# Patient Record
Sex: FEMALE | Race: WHITE | Employment: OTHER | URBAN - METROPOLITAN AREA
[De-identification: names, ages, dates, MRNs, and addresses within clinical notes are randomized per-mention and may not be internally consistent; named-entity substitution may affect disease eponyms.]

---

## 2021-05-07 ENCOUNTER — HOSPITAL ENCOUNTER (OUTPATIENT)
Age: 76
Setting detail: SPECIMEN
Discharge: HOME | End: 2021-05-07
Payer: MEDICARE

## 2021-05-07 LAB
ABSOLUTE EOS #: 0.18 K/UL (ref 0–0.44)
ABSOLUTE IMMATURE GRANULOCYTE: <0.03 K/UL (ref 0–0.3)
ABSOLUTE LYMPH #: 0.98 K/UL (ref 1.1–3.7)
ABSOLUTE MONO #: 0.53 K/UL (ref 0.1–1.2)
ALBUMIN SERPL-MCNC: 4.2 G/DL (ref 3.5–5.2)
ALBUMIN/GLOBULIN RATIO: 1.4 (ref 1–2.5)
ALP BLD-CCNC: 61 U/L (ref 35–104)
ALT SERPL-CCNC: 10 U/L (ref 5–33)
ANION GAP SERPL CALCULATED.3IONS-SCNC: 12 MMOL/L (ref 9–17)
AST SERPL-CCNC: 22 U/L
BASOPHILS # BLD: 1 % (ref 0–2)
BASOPHILS ABSOLUTE: 0.06 K/UL (ref 0–0.2)
BILIRUB SERPL-MCNC: 0.63 MG/DL (ref 0.3–1.2)
BUN BLDV-MCNC: 17 MG/DL (ref 8–23)
BUN/CREAT BLD: ABNORMAL (ref 9–20)
CALCIUM SERPL-MCNC: 9.4 MG/DL (ref 8.6–10.4)
CHLORIDE BLD-SCNC: 104 MMOL/L (ref 98–107)
CHOLESTEROL, FASTING: 149 MG/DL
CHOLESTEROL/HDL RATIO: 2.1
CO2: 27 MMOL/L (ref 20–31)
CREAT SERPL-MCNC: 0.94 MG/DL (ref 0.5–0.9)
DIFFERENTIAL TYPE: ABNORMAL
EOSINOPHILS RELATIVE PERCENT: 4 % (ref 1–4)
GFR AFRICAN AMERICAN: >60 ML/MIN
GFR NON-AFRICAN AMERICAN: 58 ML/MIN
GFR SERPL CREATININE-BSD FRML MDRD: ABNORMAL ML/MIN/{1.73_M2}
GFR SERPL CREATININE-BSD FRML MDRD: ABNORMAL ML/MIN/{1.73_M2}
GLUCOSE FASTING: 83 MG/DL (ref 70–99)
HCT VFR BLD CALC: 41.8 % (ref 36.3–47.1)
HDLC SERPL-MCNC: 70 MG/DL
HEMOGLOBIN: 13 G/DL (ref 11.9–15.1)
IMMATURE GRANULOCYTES: 0 %
LDL CHOLESTEROL: 61 MG/DL (ref 0–130)
LYMPHOCYTES # BLD: 21 % (ref 24–43)
MCH RBC QN AUTO: 30.8 PG (ref 25.2–33.5)
MCHC RBC AUTO-ENTMCNC: 31.1 G/DL (ref 28.4–34.8)
MCV RBC AUTO: 99.1 FL (ref 82.6–102.9)
MONOCYTES # BLD: 12 % (ref 3–12)
NRBC AUTOMATED: 0 PER 100 WBC
PDW BLD-RTO: 12.7 % (ref 11.8–14.4)
PLATELET # BLD: 203 K/UL (ref 138–453)
PLATELET ESTIMATE: ABNORMAL
PMV BLD AUTO: 11.4 FL (ref 8.1–13.5)
POTASSIUM SERPL-SCNC: 4.5 MMOL/L (ref 3.7–5.3)
RBC # BLD: 4.22 M/UL (ref 3.95–5.11)
RBC # BLD: ABNORMAL 10*6/UL
SEG NEUTROPHILS: 62 % (ref 36–65)
SEGMENTED NEUTROPHILS ABSOLUTE COUNT: 2.83 K/UL (ref 1.5–8.1)
SODIUM BLD-SCNC: 143 MMOL/L (ref 135–144)
TOTAL PROTEIN: 7.1 G/DL (ref 6.4–8.3)
TRIGLYCERIDE, FASTING: 92 MG/DL
VLDLC SERPL CALC-MCNC: NORMAL MG/DL (ref 1–30)
WBC # BLD: 4.6 K/UL (ref 3.5–11.3)
WBC # BLD: ABNORMAL 10*3/UL

## 2021-08-25 ENCOUNTER — HOSPITAL ENCOUNTER (INPATIENT)
Age: 76
LOS: 8 days | Discharge: NURSING, REHAB OR LONG-TERM CARE | DRG: 270 | End: 2021-09-03
Attending: EMERGENCY MEDICINE | Admitting: INTERNAL MEDICINE
Payer: MEDICARE

## 2021-08-25 ENCOUNTER — APPOINTMENT (OUTPATIENT)
Dept: GENERAL RADIOLOGY | Age: 76
DRG: 270 | End: 2021-08-25
Payer: MEDICARE

## 2021-08-25 DIAGNOSIS — I21.4 NSTEMI (NON-ST ELEVATED MYOCARDIAL INFARCTION) (HCC): ICD-10-CM

## 2021-08-25 DIAGNOSIS — R07.9 CHEST PAIN, UNSPECIFIED TYPE: Primary | ICD-10-CM

## 2021-08-25 DIAGNOSIS — N17.9 ACUTE KIDNEY INJURY (HCC): ICD-10-CM

## 2021-08-25 LAB
ABSOLUTE EOS #: 0.16 K/UL (ref 0–0.44)
ABSOLUTE IMMATURE GRANULOCYTE: <0.03 K/UL (ref 0–0.3)
ABSOLUTE LYMPH #: 1.7 K/UL (ref 1.1–3.7)
ABSOLUTE MONO #: 0.61 K/UL (ref 0.1–1.2)
ALBUMIN SERPL-MCNC: 4.4 G/DL (ref 3.5–5.2)
ALBUMIN/GLOBULIN RATIO: 1.8 (ref 1–2.5)
ALP BLD-CCNC: 63 U/L (ref 35–104)
ALT SERPL-CCNC: 11 U/L (ref 5–33)
ANION GAP SERPL CALCULATED.3IONS-SCNC: 15 MMOL/L (ref 9–17)
AST SERPL-CCNC: 27 U/L
BASOPHILS # BLD: 1 % (ref 0–2)
BASOPHILS ABSOLUTE: 0.05 K/UL (ref 0–0.2)
BILIRUB SERPL-MCNC: 0.68 MG/DL (ref 0.3–1.2)
BNP INTERPRETATION: ABNORMAL
BUN BLDV-MCNC: 18 MG/DL (ref 8–23)
BUN/CREAT BLD: ABNORMAL (ref 9–20)
CALCIUM SERPL-MCNC: 9.4 MG/DL (ref 8.6–10.4)
CHLORIDE BLD-SCNC: 104 MMOL/L (ref 98–107)
CO2: 22 MMOL/L (ref 20–31)
CREAT SERPL-MCNC: 0.93 MG/DL (ref 0.5–0.9)
DIFFERENTIAL TYPE: NORMAL
EOSINOPHILS RELATIVE PERCENT: 3 % (ref 1–4)
GFR AFRICAN AMERICAN: >60 ML/MIN
GFR NON-AFRICAN AMERICAN: 58 ML/MIN
GFR SERPL CREATININE-BSD FRML MDRD: ABNORMAL ML/MIN/{1.73_M2}
GFR SERPL CREATININE-BSD FRML MDRD: ABNORMAL ML/MIN/{1.73_M2}
GLUCOSE BLD-MCNC: 107 MG/DL (ref 70–99)
HCT VFR BLD CALC: 41.3 % (ref 36.3–47.1)
HCT VFR BLD CALC: 42.1 % (ref 36.3–47.1)
HEMOGLOBIN: 13.4 G/DL (ref 11.9–15.1)
HEMOGLOBIN: 13.6 G/DL (ref 11.9–15.1)
IMMATURE GRANULOCYTES: 0 %
LIPASE: 19 U/L (ref 13–60)
LYMPHOCYTES # BLD: 31 % (ref 24–43)
MCH RBC QN AUTO: 31.3 PG (ref 25.2–33.5)
MCH RBC QN AUTO: 31.4 PG (ref 25.2–33.5)
MCHC RBC AUTO-ENTMCNC: 31.8 G/DL (ref 28.4–34.8)
MCHC RBC AUTO-ENTMCNC: 32.9 G/DL (ref 28.4–34.8)
MCV RBC AUTO: 95.2 FL (ref 82.6–102.9)
MCV RBC AUTO: 98.6 FL (ref 82.6–102.9)
MONOCYTES # BLD: 11 % (ref 3–12)
NRBC AUTOMATED: 0 PER 100 WBC
NRBC AUTOMATED: 0 PER 100 WBC
PARTIAL THROMBOPLASTIN TIME: 24.8 SEC (ref 20.5–30.5)
PDW BLD-RTO: 12.3 % (ref 11.8–14.4)
PDW BLD-RTO: 12.3 % (ref 11.8–14.4)
PLATELET # BLD: 167 K/UL (ref 138–453)
PLATELET # BLD: 171 K/UL (ref 138–453)
PLATELET ESTIMATE: NORMAL
PMV BLD AUTO: 11.5 FL (ref 8.1–13.5)
PMV BLD AUTO: 11.7 FL (ref 8.1–13.5)
POTASSIUM SERPL-SCNC: 4.7 MMOL/L (ref 3.7–5.3)
PRO-BNP: 1690 PG/ML
RBC # BLD: 4.27 M/UL (ref 3.95–5.11)
RBC # BLD: 4.34 M/UL (ref 3.95–5.11)
RBC # BLD: NORMAL 10*6/UL
SEG NEUTROPHILS: 54 % (ref 36–65)
SEGMENTED NEUTROPHILS ABSOLUTE COUNT: 2.99 K/UL (ref 1.5–8.1)
SODIUM BLD-SCNC: 141 MMOL/L (ref 135–144)
TOTAL PROTEIN: 6.8 G/DL (ref 6.4–8.3)
TROPONIN INTERP: ABNORMAL
TROPONIN INTERP: ABNORMAL
TROPONIN INTERP: NORMAL
TROPONIN T: ABNORMAL NG/ML
TROPONIN T: ABNORMAL NG/ML
TROPONIN T: NORMAL NG/ML
TROPONIN, HIGH SENSITIVITY: 13 NG/L (ref 0–14)
TROPONIN, HIGH SENSITIVITY: 267 NG/L (ref 0–14)
TROPONIN, HIGH SENSITIVITY: 291 NG/L (ref 0–14)
WBC # BLD: 5.5 K/UL (ref 3.5–11.3)
WBC # BLD: 6.4 K/UL (ref 3.5–11.3)
WBC # BLD: NORMAL 10*3/UL

## 2021-08-25 PROCEDURE — 83880 ASSAY OF NATRIURETIC PEPTIDE: CPT

## 2021-08-25 PROCEDURE — 96365 THER/PROPH/DIAG IV INF INIT: CPT

## 2021-08-25 PROCEDURE — 84484 ASSAY OF TROPONIN QUANT: CPT

## 2021-08-25 PROCEDURE — 80053 COMPREHEN METABOLIC PANEL: CPT

## 2021-08-25 PROCEDURE — 6370000000 HC RX 637 (ALT 250 FOR IP): Performed by: EMERGENCY MEDICINE

## 2021-08-25 PROCEDURE — 85025 COMPLETE CBC W/AUTO DIFF WBC: CPT

## 2021-08-25 PROCEDURE — 93005 ELECTROCARDIOGRAM TRACING: CPT | Performed by: EMERGENCY MEDICINE

## 2021-08-25 PROCEDURE — 85730 THROMBOPLASTIN TIME PARTIAL: CPT

## 2021-08-25 PROCEDURE — 6360000002 HC RX W HCPCS: Performed by: NURSE PRACTITIONER

## 2021-08-25 PROCEDURE — 99285 EMERGENCY DEPT VISIT HI MDM: CPT

## 2021-08-25 PROCEDURE — G0378 HOSPITAL OBSERVATION PER HR: HCPCS

## 2021-08-25 PROCEDURE — 2580000003 HC RX 258: Performed by: EMERGENCY MEDICINE

## 2021-08-25 PROCEDURE — 36415 COLL VENOUS BLD VENIPUNCTURE: CPT

## 2021-08-25 PROCEDURE — 96366 THER/PROPH/DIAG IV INF ADDON: CPT

## 2021-08-25 PROCEDURE — 85027 COMPLETE CBC AUTOMATED: CPT

## 2021-08-25 PROCEDURE — 71045 X-RAY EXAM CHEST 1 VIEW: CPT

## 2021-08-25 PROCEDURE — 94761 N-INVAS EAR/PLS OXIMETRY MLT: CPT

## 2021-08-25 PROCEDURE — 83690 ASSAY OF LIPASE: CPT

## 2021-08-25 RX ORDER — ATORVASTATIN CALCIUM 10 MG/1
10 TABLET, FILM COATED ORAL NIGHTLY
Status: DISCONTINUED | OUTPATIENT
Start: 2021-08-25 | End: 2021-08-26

## 2021-08-25 RX ORDER — SODIUM CHLORIDE 0.9 % (FLUSH) 0.9 %
5-40 SYRINGE (ML) INJECTION EVERY 12 HOURS SCHEDULED
Status: DISCONTINUED | OUTPATIENT
Start: 2021-08-25 | End: 2021-08-26

## 2021-08-25 RX ORDER — SODIUM CHLORIDE 0.9 % (FLUSH) 0.9 %
5-40 SYRINGE (ML) INJECTION PRN
Status: DISCONTINUED | OUTPATIENT
Start: 2021-08-25 | End: 2021-08-26

## 2021-08-25 RX ORDER — ACETAMINOPHEN 650 MG/1
650 SUPPOSITORY RECTAL EVERY 6 HOURS PRN
Status: DISCONTINUED | OUTPATIENT
Start: 2021-08-25 | End: 2021-08-28

## 2021-08-25 RX ORDER — CLOPIDOGREL BISULFATE 75 MG/1
75 TABLET ORAL DAILY
Status: DISCONTINUED | OUTPATIENT
Start: 2021-08-25 | End: 2021-08-26

## 2021-08-25 RX ORDER — ACETAMINOPHEN 325 MG/1
650 TABLET ORAL EVERY 6 HOURS PRN
Status: DISCONTINUED | OUTPATIENT
Start: 2021-08-25 | End: 2021-08-28

## 2021-08-25 RX ORDER — HEPARIN SODIUM 1000 [USP'U]/ML
60 INJECTION, SOLUTION INTRAVENOUS; SUBCUTANEOUS ONCE
Status: COMPLETED | OUTPATIENT
Start: 2021-08-25 | End: 2021-08-25

## 2021-08-25 RX ORDER — ASPIRIN 81 MG/1
81 TABLET ORAL DAILY
Status: DISCONTINUED | OUTPATIENT
Start: 2021-08-25 | End: 2021-08-26

## 2021-08-25 RX ORDER — HEPARIN SODIUM 1000 [USP'U]/ML
30 INJECTION, SOLUTION INTRAVENOUS; SUBCUTANEOUS PRN
Status: DISCONTINUED | OUTPATIENT
Start: 2021-08-25 | End: 2021-08-26

## 2021-08-25 RX ORDER — ISOSORBIDE DINITRATE 10 MG/1
10 TABLET ORAL 2 TIMES DAILY
Status: DISCONTINUED | OUTPATIENT
Start: 2021-08-25 | End: 2021-08-26

## 2021-08-25 RX ORDER — SODIUM CHLORIDE 9 MG/ML
25 INJECTION, SOLUTION INTRAVENOUS PRN
Status: DISCONTINUED | OUTPATIENT
Start: 2021-08-25 | End: 2021-08-27

## 2021-08-25 RX ORDER — HEPARIN SODIUM 1000 [USP'U]/ML
60 INJECTION, SOLUTION INTRAVENOUS; SUBCUTANEOUS PRN
Status: DISCONTINUED | OUTPATIENT
Start: 2021-08-25 | End: 2021-08-26

## 2021-08-25 RX ORDER — DILTIAZEM HYDROCHLORIDE 180 MG/1
180 CAPSULE, COATED, EXTENDED RELEASE ORAL DAILY
Status: DISCONTINUED | OUTPATIENT
Start: 2021-08-25 | End: 2021-08-26

## 2021-08-25 RX ORDER — NITROGLYCERIN 0.4 MG/1
0.4 TABLET SUBLINGUAL ONCE
Status: COMPLETED | OUTPATIENT
Start: 2021-08-25 | End: 2021-08-25

## 2021-08-25 RX ORDER — HEPARIN SODIUM 10000 [USP'U]/100ML
5-30 INJECTION, SOLUTION INTRAVENOUS CONTINUOUS
Status: DISCONTINUED | OUTPATIENT
Start: 2021-08-25 | End: 2021-08-26

## 2021-08-25 RX ADMIN — CLOPIDOGREL 75 MG: 75 TABLET, FILM COATED ORAL at 17:01

## 2021-08-25 RX ADMIN — METOPROLOL TARTRATE 25 MG: 25 TABLET ORAL at 22:01

## 2021-08-25 RX ADMIN — NITROGLYCERIN 0.4 MG: 0.4 TABLET SUBLINGUAL at 14:49

## 2021-08-25 RX ADMIN — ATORVASTATIN CALCIUM 10 MG: 10 TABLET, FILM COATED ORAL at 22:01

## 2021-08-25 RX ADMIN — HEPARIN SODIUM 3190 UNITS: 1000 INJECTION INTRAVENOUS; SUBCUTANEOUS at 20:18

## 2021-08-25 RX ADMIN — HEPARIN SODIUM AND DEXTROSE 12 UNITS/KG/HR: 10000; 5 INJECTION INTRAVENOUS at 20:19

## 2021-08-25 RX ADMIN — SODIUM CHLORIDE, PRESERVATIVE FREE 10 ML: 5 INJECTION INTRAVENOUS at 20:26

## 2021-08-25 ASSESSMENT — PAIN DESCRIPTION - PAIN TYPE: TYPE: ACUTE PAIN

## 2021-08-25 ASSESSMENT — PAIN DESCRIPTION - ORIENTATION: ORIENTATION: MID

## 2021-08-25 ASSESSMENT — PAIN DESCRIPTION - LOCATION: LOCATION: CHEST

## 2021-08-25 ASSESSMENT — PAIN DESCRIPTION - ONSET: ONSET: ON-GOING

## 2021-08-25 ASSESSMENT — PAIN DESCRIPTION - DESCRIPTORS: DESCRIPTORS: PRESSURE

## 2021-08-25 ASSESSMENT — PAIN SCALES - GENERAL
PAINLEVEL_OUTOF10: 1
PAINLEVEL_OUTOF10: 1
PAINLEVEL_OUTOF10: 0

## 2021-08-25 ASSESSMENT — ENCOUNTER SYMPTOMS
SHORTNESS OF BREATH: 0
NAUSEA: 0
VOMITING: 0
TROUBLE SWALLOWING: 0

## 2021-08-25 ASSESSMENT — PAIN DESCRIPTION - FREQUENCY: FREQUENCY: CONTINUOUS

## 2021-08-25 NOTE — PROGRESS NOTES
CDU Transfer Summary        Patient:  Shana Najera  YOB: 1945    MRN: [id number]   Acct: [de-identified]    Primary Care Physician: No primary care provider on file. Admit date:  8/25/2021 12:12 PM  Transfer date:08/25/21  Transfer Diagnoses: Elevated Troponin    Acute chest pain due to unknown etiology. Elevated troponin from emergency department of 13 2-91    Follow-up: Outpatient follow up will be arranged by discharging service. Discharge Medications:  Changes to medications made prior to transfer        [address] Medication Instructions SRD:[id number]    Printed on:08/25/21 1934   Medication Information                      aspirin 81 MG EC tablet  Take 81 mg by mouth daily. Atorvastatin Calcium (LIPITOR PO)  Take 10 mg by mouth daily. BIOTIN  Take 3,000 Units by mouth daily. DILTIAZEM HCL  Take 180 mg by mouth daily. ISOSORBIDE  Take 10 mg by mouth 2 times daily. metoprolol (LOPRESSOR) 25 MG tablet  Take 25 mg by mouth 2 times daily. Diet:  ADULT DIET;  Regular  Diet NPO , Advance as tolerated     Activity:  As tolerated    Consultants: IP CONSULT TO CARDIOLOGY    Procedures:  Not indicated     Diagnostic Test:   Results for orders placed or performed during the hospital encounter of 08/25/21   CBC Auto Differential   Result Value Ref Range    WBC 5.5 3.5 - 11.3 k/uL    RBC 4.27 3.95 - 5.11 m/uL    Hemoglobin 13.4 11.9 - 15.1 g/dL    Hematocrit 42.1 36.3 - 47.1 %    MCV 98.6 82.6 - 102.9 fL    MCH 31.4 25.2 - 33.5 pg    MCHC 31.8 28.4 - 34.8 g/dL    RDW 12.3 11.8 - 14.4 %    Platelets 842 121 - 835 k/uL    MPV 11.7 8.1 - 13.5 fL    NRBC Automated 0.0 0.0 per 100 WBC    Differential Type NOT REPORTED     Seg Neutrophils 54 36 - 65 %    Lymphocytes 31 24 - 43 %    Monocytes 11 3 - 12 %    Eosinophils % 3 1 - 4 %    Basophils 1 0 - 2 %    Immature Granulocytes 0 0 %    Segs Absolute 2.99 1.50 -6 degrees    R Axis 0 degrees    T Axis 5 degrees     XR CHEST PORTABLE    Result Date: 8/25/2021  EXAMINATION: ONE XRAY VIEW OF THE CHEST 8/25/2021 12:50 pm COMPARISON: None. HISTORY: ORDERING SYSTEM PROVIDED HISTORY: chest pain TECHNOLOGIST PROVIDED HISTORY: chest pain Reason for Exam: upr Acuity: Unknown Type of Exam: Unknown FINDINGS: Calcifications involving the aorta reflect atherosclerosis. The cardiomediastinal and hilar silhouettes appear otherwise unremarkable. Chronic appearing coarse interstitial densities predominate perihilar regions and lung bases, typical of sequela from smoking or other previous infectious/inflammatory process. The lungs are hyperinflated with increased translucency both lung apices and tapering of the vasculature in the upper lungs. The lungs appear otherwise clear. No pleural fluid evident. No pneumothorax is seen. No acute osseous abnormality is identified. Sequela from prior open heart surgery. No acute pulmonary disease. Calcific atherosclerotic disease aorta. Physical Exam:    General appearance - NAD, AOx 3   Lungs -CTAB, no R/R/R  Heart - RRR, no M/R/G  Abdomen - Soft, NT/ND  Neurological:  MAEx4, No focal motor deficit, sensory loss  Extremities - Cap refil <2 sec in all ext., no edema  Skin -warm, dry      Hospital Course:  Clinical course has improved, labs and imaging reviewed. Rut Adams originally presented to the hospital on 8/25/2021 12:12 PM. with complaints of chest discomfort that began around 11 AM associated with radiation to the jaw. At that time it was determined that She required further observation and cardiac evaluation. She was admitted and labs and imaging were followed daily. Imaging results as above. Upon admission to observation unit patient had 2 elevated troponins that increased from 13 2-67 and 291. EKG normal sinus rhythm with nonspecific T wave abnormality. Patient complaining of pain of a 1 on a scale of 0-10.

## 2021-08-25 NOTE — ED NOTES
Pt arrived to the ED via EMS with chest pain that started this morning while lying in bed. Pt received 1 nitrogylcerin and 324mg of apirin PTA. Pt denies chest pain currently. EKG obtained, pt placed on full cardiac monitor. RR even and unlabored. A&Ox4.      Nadeem Rodriguez RN  08/25/21 4449

## 2021-08-25 NOTE — ED PROVIDER NOTES
[facility] ED  Emergency Department  Faculty Sign-Out Addendum     Care of Luciano Canela was assumed from previous attending and is being seen for Chest Pain  . The patient's initial evaluation and plan have been discussed with the prior provider who initially evaluated the patient. Attestation    I was available and discussed any additional care issues that arose and coordinated the management plans with the resident(s) caring for the patient during my duty period. Any areas of disagreement with residents documentation of care or procedures are noted on the chart. I was personally present for the key portions of any/all procedures during my duty period. I have documented in the chart those procedures where I was not present during the key portions. EMERGENCY DEPARTMENT COURSE / MEDICAL DECISION MAKING:       MEDICATIONS GIVEN:  Orders Placed This Encounter   Medications    nitroGLYCERIN (NITROSTAT) SL tablet 0.4 mg       LABS / RADIOLOGY:     Labs Reviewed   COMPREHENSIVE METABOLIC PANEL W/ REFLEX TO MG FOR LOW K - Abnormal; Notable for the following components:       Result Value    Glucose 107 (*)     CREATININE 0.93 (*)     GFR Non- 58 (*)     All other components within normal limits   BRAIN NATRIURETIC PEPTIDE - Abnormal; Notable for the following components:    Pro-BNP 1,690 (*)     All other components within normal limits   CBC WITH AUTO DIFFERENTIAL   TROPONIN   LIPASE       XR CHEST PORTABLE    Result Date: 8/25/2021  EXAMINATION: ONE XRAY VIEW OF THE CHEST 8/25/2021 12:50 pm COMPARISON: None. HISTORY: ORDERING SYSTEM PROVIDED HISTORY: chest pain TECHNOLOGIST PROVIDED HISTORY: chest pain Reason for Exam: upr Acuity: Unknown Type of Exam: Unknown FINDINGS: Calcifications involving the aorta reflect atherosclerosis. The cardiomediastinal and hilar silhouettes appear otherwise unremarkable.  Chronic appearing coarse interstitial densities predominate perihilar regions and lung bases, typical of sequela from smoking or other previous infectious/inflammatory process. The lungs are hyperinflated with increased translucency both lung apices and tapering of the vasculature in the upper lungs. The lungs appear otherwise clear. No pleural fluid evident. No pneumothorax is seen. No acute osseous abnormality is identified. Sequela from prior open heart surgery. No acute pulmonary disease. Calcific atherosclerotic disease aorta. RECENT VITALS:     Temp: 98.3 °F (36.8 °C),  Pulse: 66, Resp: 13, BP: 132/64, SpO2: 98 %    Binh Pickard is a 64 y.o. female who presents with complaint of chest pain. Prior history of CABG. Cardiac work-up thus far unremarkable in the emergency department. Admitted to observation unit for cardiology consultation    OUTSTANDING TASKS / RECOMMENDATIONS:    1.  Disposition made by previous attending and nothing to do      Woodroe Litten, MD, [place]  Attending Emergency Physician  [facility] ED        Claude Gudino MD  08/25/21 2617

## 2021-08-25 NOTE — H&P
98%.            DIFFERENTIAL DIAGNOSIS/MDM:     DDx: Acute angina, MI, CHF    DIAGNOSTIC RESULTS     RADIOLOGY:   I directly visualized the following  images and reviewed the radiologist interpretations:    XR CHEST PORTABLE    Result Date: 8/25/2021  EXAMINATION: ONE XRAY VIEW OF THE CHEST 8/25/2021 12:50 pm COMPARISON: None. HISTORY: ORDERING SYSTEM PROVIDED HISTORY: chest pain TECHNOLOGIST PROVIDED HISTORY: chest pain Reason for Exam: upr Acuity: Unknown Type of Exam: Unknown FINDINGS: Calcifications involving the aorta reflect atherosclerosis. The cardiomediastinal and hilar silhouettes appear otherwise unremarkable. Chronic appearing coarse interstitial densities predominate perihilar regions and lung bases, typical of sequela from smoking or other previous infectious/inflammatory process. The lungs are hyperinflated with increased translucency both lung apices and tapering of the vasculature in the upper lungs. The lungs appear otherwise clear. No pleural fluid evident. No pneumothorax is seen. No acute osseous abnormality is identified. Sequela from prior open heart surgery. No acute pulmonary disease. Calcific atherosclerotic disease aorta. LABS:  I have reviewed and interpreted all available lab results.   Labs Reviewed   COMPREHENSIVE METABOLIC PANEL W/ REFLEX TO MG FOR LOW K - Abnormal; Notable for the following components:       Result Value    Glucose 107 (*)     CREATININE 0.93 (*)     GFR Non- 58 (*)     All other components within normal limits   BRAIN NATRIURETIC PEPTIDE - Abnormal; Notable for the following components:    Pro-BNP 1,690 (*)     All other components within normal limits   CBC WITH AUTO DIFFERENTIAL   TROPONIN   LIPASE   TROPONIN   TROPONIN       SCREENING TOOLS:    HEART Risk Score for Chest Pain Patients   History and Physical Exam Suspicion Level  (Nausea, Vomiting, Diaphoresis, Radiation, Exertion)   Slightly Suspicious (0 pts)   Moderately Suspicious (1 pt)   Highly Suspicious (2 pts)   EKG Interpretation   Normal (0 pts)   Non-Specific Repolarization Disturbance (1 pt)   Significant ST-Depression (2 pts)   Age of Patient (in years)   = 39 (0 pts)   46-64 (1 pt)   = 65 (2 pts)   Risk Factors   No Risk Factors (0 pts)   1-2 Risk Factors (1 pt)   = 3 Risk Factors (2 pts)   Risk Factors Include:   Hypercholesterolemia   Hypertension   Diabetes Mellitus   Cigarette smoking   Positive family history   Obesity   CAD   (SLE, CKDz, HIV, Cocaine abuse)   Troponin Levels   = Normal Limit (0 pts)   1-3 Times Normal Limit (1 pt)   > 3 Times Normal Limit (2 pts)  TOTAL:    Percent Risk for Major Adverse Cardiac Event (MACE)  0-3 pts indicates low risk for MACE   2.5% (DISCHARGE)   4-7 pts indicates moderate risk for MACE  20.3% (OBS)  8-10 pts indicates high risk for MACE  72.7% (EARLY INVASIVE TX)    CDU IMPRESSION / PLAN      Salvador Weber is a 64 y.o. female who presents with complaints of chest pain that radiates to the jaw that began around 11 AM.  Patient has a history of CAD with surgery approximately 25 years ago. Patient denies any associated symptoms such as shortness of breath, numbness and tingling. Given clinical presentation will admit for further observation and cardiac evaluation    1. Inpatient consult to cardiology-appreciate recommendations  · Symptom management  · Continue home medications and pain control  · Monitor vitals, labs, and imaging  · DISPO: pending consults and clinical improvement    CONSULTS:    IP CONSULT TO CARDIOLOGY    PROCEDURES:  Not indicated       PATIENT REFERRED TO:    No follow-up provider specified. --  My Supervising Physician for today is Dr. Ladi Moran  We discussed and agreed upon a treatment plan  Kary Floyd, [facility]   Emergency Medicine Resident     This dictation was generated by voice recognition computer software.   Although all attempts are made to edit the dictation for accuracy, there may be errors in the transcription that are not intended.

## 2021-08-25 NOTE — ED NOTES
Bed: 29  Expected date:   Expected time:   Means of arrival:   Comments:  KWABENA Rodriugez RN  08/25/21 1214

## 2021-08-25 NOTE — Clinical Note
Patient Class: Observation [104]   REQUIRED: Diagnosis: Chest pain [290078]   Estimated Length of Stay: Estimated stay of less than 2 midnights   Admitting Provider: Emely Faye [[id number]]   Telemetry/Cardiac Monitoring Required?: Yes

## 2021-08-25 NOTE — ED PROVIDER NOTES
[facility] ED  eMERGENCY dEPARTMENT eNCOUnter      Pt Name: Ricardo Alfonso  MRN: [id number]  [place] 1945  Date of evaluation: 8/25/21      CHIEF COMPLAINT       Chief Complaint   Patient presents with    Chest Pain       HISTORY OF PRESENT ILLNESS     Ricardo Alfonso is a 64 y.o. female who presents to the emergency department for evaluation of generalized chest discomfort. Onset was around 11:00 this morning. Patient describes diffuse chest pain with radiation to the jaw. She does have a history of coronary artery disease but no prior stents. Associated today with diaphoresis. No shortness of breath, cough, or wheezing. No palpitations or near syncope. She was given aspirin and nitroglycerin by EMS providers and her chest pain has now resolved. She denies any recent illness or fever. No nausea/vomiting. No abdominal pain. No genitourinary or stool complaints. No lower extremity edema or calf pain. Review of systems otherwise negative. She has no additional complaints at this time. REVIEW OF SYSTEMS       Review of Systems   Constitutional: Positive for diaphoresis. Cardiovascular: Positive for chest pain. All other systems reviewed and are negative. PAST MEDICAL HISTORY      has a past medical history of Atherosclerosis, CAD (coronary artery disease), Hydatidiform mole, and Rosacea. SURGICAL HISTORY        has a past surgical history that includes Carotid endarterectomy (age 44); Coronary artery bypass graft (age 45); Tonsillectomy and adenoidectomy (age 8); and Dilation & curettage (1971). CURRENT MEDICATIONS       Previous Medications    ASPIRIN 81 MG EC TABLET    Take 81 mg by mouth daily. ATORVASTATIN CALCIUM (LIPITOR PO)    Take 10 mg by mouth daily. BIOTIN    Take 3,000 Units by mouth daily. CLOPIDOGREL BISULFATE (PLAVIX PO)    Take 75 mg by mouth daily. DILTIAZEM HCL    Take 180 mg by mouth daily.     ISOSORBIDE    Take 10 mg by mouth 2 times daily. METOPROLOL (LOPRESSOR) 25 MG TABLET    Take 25 mg by mouth 2 times daily. ALLERGIES       is allergic to aspirin-acetaminophen-caffeine. FAMILY HISTORY       She indicated that the status of her mother is unknown. She indicated that the status of her father is unknown. She indicated that the status of her sister is unknown. She indicated that the status of her paternal grandfather is unknown.     family history includes Other in her father, mother, paternal grandfather, and sister. SOCIAL HISTORY        reports that she has never smoked. She has never used smokeless tobacco. She reports that she does not drink alcohol and does not use drugs. PHYSICAL EXAM       INITIAL VITALS:  height is 5' 1\" (1.549 m) and weight is 112 lb (50.8 kg). Her oral temperature is 98.3 °F (36.8 °C). Her blood pressure is 135/61 and her pulse is 66. Her respiration is 13 and oxygen saturation is 99%. Physical Exam  Vitals and nursing note reviewed. Constitutional:       Appearance: Normal appearance. Cardiovascular:      Rate and Rhythm: Normal rate and regular rhythm. Heart sounds: Normal heart sounds. No murmur heard. Pulmonary:      Effort: Pulmonary effort is normal.      Breath sounds: Normal breath sounds. Chest:      Chest wall: No tenderness. Abdominal:      Palpations: Abdomen is soft. Tenderness: There is no abdominal tenderness. Musculoskeletal:         General: No tenderness. Right lower leg: No edema. Left lower leg: No edema. Skin:     General: Skin is warm and dry. Capillary Refill: Capillary refill takes less than 2 seconds. Neurological:      General: No focal deficit present. Mental Status: She is alert.    Psychiatric:         Mood and Affect: Mood normal.         Behavior: Behavior normal.       DIAGNOSTIC RESULTS     EKG: All EKG's are interpreted by the Emergency Department Physician who either signs or Co-signs this chart in the absence of a cardiologist.    EKG shows a normal sinus rhythm. Intervals within normal limits. Normal axis. R wave progression is maintained. No T wave inversion. No ST segment depression/elevation or evidence for acute ischemia/infarction. RADIOLOGY:   I directly visualized the following images and reviewed the radiologist interpretations:    XR CHEST PORTABLE   Final Result   No acute pulmonary disease. Calcific atherosclerotic disease aorta.              LABS:  Labs Reviewed   COMPREHENSIVE METABOLIC PANEL W/ REFLEX TO MG FOR LOW K - Abnormal; Notable for the following components:       Result Value    Glucose 107 (*)     CREATININE 0.93 (*)     GFR Non- 58 (*)     All other components within normal limits   BRAIN NATRIURETIC PEPTIDE - Abnormal; Notable for the following components:    Pro-BNP 1,690 (*)     All other components within normal limits   CBC WITH AUTO DIFFERENTIAL   TROPONIN   LIPASE     Labs reviewed      EMERGENCY DEPARTMENT COURSE:     Vitals:    Vitals:    08/25/21 1231 08/25/21 1246 08/25/21 1332 08/25/21 1401   BP: (!) 155/66 (!) 148/70 136/62 135/61   Pulse: 63 61 64 66   Resp: 12 17 23 13   Temp:       TempSrc:       SpO2: 95% 99%     Weight:       Height:         -------------------------  BP: 135/61, Temp: 98.3 °F (36.8 °C), Pulse: 66, Resp: 13    Patient updated      MEDICAL DECISION MAKING:     Patient with known history of coronary artery disease  No acute ischemia or infarction on EKG  Troponin normal  Elevated BNP  Her pain resolved after nitroglycerin  She was updated on lab, imaging, and EKG results  States that her chest pain has started to return but is only very mild at this time  Repeat nitrates ordered  Patient agreeable with plan of care for admission  With a normal troponin/EKG we will place in the observation unit  Her cardiologist is through [address] [place], [place] [address]Dr. Ronnie  Cardiology consult placed in admission orders      FINAL IMPRESSION 1. Chest pain, unspecified type          DISPOSITION/PLAN     Admit      (Please note that portions of this note were completed with a voice recognition program.  Efforts were made to edit the dictations but occasionally words are mis-transcribed.)    Meliza Live.  Delma,   Attending Emergency Physician                    Antonette Moise, DO  08/25/21 [facility] [address], DO  09/21/21 120

## 2021-08-26 ENCOUNTER — APPOINTMENT (OUTPATIENT)
Dept: CARDIAC CATH/INVASIVE PROCEDURES | Age: 76
DRG: 270 | End: 2021-08-26
Payer: MEDICARE

## 2021-08-26 PROBLEM — I21.4 NSTEMI (NON-ST ELEVATED MYOCARDIAL INFARCTION) (HCC): Status: ACTIVE | Noted: 2021-08-26

## 2021-08-26 LAB
ABSOLUTE EOS #: 0.15 K/UL (ref 0–0.44)
ABSOLUTE IMMATURE GRANULOCYTE: <0.03 K/UL (ref 0–0.3)
ABSOLUTE LYMPH #: 1.57 K/UL (ref 1.1–3.7)
ABSOLUTE MONO #: 0.67 K/UL (ref 0.1–1.2)
ACTIVATED CLOTTING TIME: 207 SEC (ref 79–149)
ANION GAP SERPL CALCULATED.3IONS-SCNC: 13 MMOL/L (ref 9–17)
BASOPHILS # BLD: 1 % (ref 0–2)
BASOPHILS ABSOLUTE: 0.06 K/UL (ref 0–0.2)
BUN BLDV-MCNC: 18 MG/DL (ref 8–23)
BUN/CREAT BLD: ABNORMAL (ref 9–20)
CALCIUM SERPL-MCNC: 8.9 MG/DL (ref 8.6–10.4)
CHLORIDE BLD-SCNC: 106 MMOL/L (ref 98–107)
CO2: 22 MMOL/L (ref 20–31)
CREAT SERPL-MCNC: 0.99 MG/DL (ref 0.5–0.9)
DIFFERENTIAL TYPE: ABNORMAL
EKG ATRIAL RATE: 64 BPM
EKG ATRIAL RATE: 65 BPM
EKG ATRIAL RATE: 74 BPM
EKG P AXIS: -6 DEGREES
EKG P AXIS: 55 DEGREES
EKG P AXIS: 56 DEGREES
EKG P-R INTERVAL: 134 MS
EKG Q-T INTERVAL: 412 MS
EKG Q-T INTERVAL: 422 MS
EKG Q-T INTERVAL: 480 MS
EKG QRS DURATION: 78 MS
EKG QRS DURATION: 80 MS
EKG QRS DURATION: 82 MS
EKG QTC CALCULATION (BAZETT): 428 MS
EKG QTC CALCULATION (BAZETT): 468 MS
EKG QTC CALCULATION (BAZETT): 495 MS
EKG R AXIS: 0 DEGREES
EKG R AXIS: 40 DEGREES
EKG R AXIS: 50 DEGREES
EKG T AXIS: 35 DEGREES
EKG T AXIS: 41 DEGREES
EKG T AXIS: 5 DEGREES
EKG VENTRICULAR RATE: 64 BPM
EKG VENTRICULAR RATE: 65 BPM
EKG VENTRICULAR RATE: 74 BPM
EOSINOPHILS RELATIVE PERCENT: 3 % (ref 1–4)
GFR AFRICAN AMERICAN: >60 ML/MIN
GFR NON-AFRICAN AMERICAN: 54 ML/MIN
GFR SERPL CREATININE-BSD FRML MDRD: ABNORMAL ML/MIN/{1.73_M2}
GFR SERPL CREATININE-BSD FRML MDRD: ABNORMAL ML/MIN/{1.73_M2}
GLUCOSE BLD-MCNC: 86 MG/DL (ref 70–99)
HCT VFR BLD CALC: 38.8 % (ref 36.3–47.1)
HEMOGLOBIN: 12.4 G/DL (ref 11.9–15.1)
IMMATURE GRANULOCYTES: 0 %
LV EF: 30 %
LVEF MODALITY: NORMAL
LYMPHOCYTES # BLD: 30 % (ref 24–43)
MCH RBC QN AUTO: 31.1 PG (ref 25.2–33.5)
MCHC RBC AUTO-ENTMCNC: 32 G/DL (ref 28.4–34.8)
MCV RBC AUTO: 97.2 FL (ref 82.6–102.9)
MONOCYTES # BLD: 13 % (ref 3–12)
NRBC AUTOMATED: 0 PER 100 WBC
PARTIAL THROMBOPLASTIN TIME: 107.6 SEC (ref 20.5–30.5)
PARTIAL THROMBOPLASTIN TIME: 44.1 SEC (ref 20.5–30.5)
PDW BLD-RTO: 12.5 % (ref 11.8–14.4)
PLATELET # BLD: 158 K/UL (ref 138–453)
PLATELET ESTIMATE: ABNORMAL
PMV BLD AUTO: 11.2 FL (ref 8.1–13.5)
POTASSIUM SERPL-SCNC: 4.4 MMOL/L (ref 3.7–5.3)
RBC # BLD: 3.99 M/UL (ref 3.95–5.11)
RBC # BLD: ABNORMAL 10*6/UL
SEG NEUTROPHILS: 53 % (ref 36–65)
SEGMENTED NEUTROPHILS ABSOLUTE COUNT: 2.86 K/UL (ref 1.5–8.1)
SODIUM BLD-SCNC: 141 MMOL/L (ref 135–144)
TROPONIN INTERP: ABNORMAL
TROPONIN INTERP: ABNORMAL
TROPONIN T: ABNORMAL NG/ML
TROPONIN T: ABNORMAL NG/ML
TROPONIN, HIGH SENSITIVITY: 319 NG/L (ref 0–14)
TROPONIN, HIGH SENSITIVITY: 340 NG/L (ref 0–14)
WBC # BLD: 5.3 K/UL (ref 3.5–11.3)
WBC # BLD: ABNORMAL 10*3/UL

## 2021-08-26 PROCEDURE — 93010 ELECTROCARDIOGRAM REPORT: CPT | Performed by: INTERNAL MEDICINE

## 2021-08-26 PROCEDURE — 02703EZ DILATION OF CORONARY ARTERY, ONE ARTERY WITH TWO INTRALUMINAL DEVICES, PERCUTANEOUS APPROACH: ICD-10-PCS | Performed by: INTERNAL MEDICINE

## 2021-08-26 PROCEDURE — 85025 COMPLETE CBC W/AUTO DIFF WBC: CPT

## 2021-08-26 PROCEDURE — 80048 BASIC METABOLIC PNL TOTAL CA: CPT

## 2021-08-26 PROCEDURE — 2709999900 HC NON-CHARGEABLE SUPPLY

## 2021-08-26 PROCEDURE — 6360000002 HC RX W HCPCS: Performed by: STUDENT IN AN ORGANIZED HEALTH CARE EDUCATION/TRAINING PROGRAM

## 2021-08-26 PROCEDURE — C1769 GUIDE WIRE: HCPCS

## 2021-08-26 PROCEDURE — 84484 ASSAY OF TROPONIN QUANT: CPT

## 2021-08-26 PROCEDURE — 93306 TTE W/DOPPLER COMPLETE: CPT

## 2021-08-26 PROCEDURE — C1874 STENT, COATED/COV W/DEL SYS: HCPCS

## 2021-08-26 PROCEDURE — 4A023N7 MEASUREMENT OF CARDIAC SAMPLING AND PRESSURE, LEFT HEART, PERCUTANEOUS APPROACH: ICD-10-PCS | Performed by: INTERNAL MEDICINE

## 2021-08-26 PROCEDURE — B2111ZZ FLUOROSCOPY OF MULTIPLE CORONARY ARTERIES USING LOW OSMOLAR CONTRAST: ICD-10-PCS | Performed by: INTERNAL MEDICINE

## 2021-08-26 PROCEDURE — 36415 COLL VENOUS BLD VENIPUNCTURE: CPT

## 2021-08-26 PROCEDURE — C1725 CATH, TRANSLUMIN NON-LASER: HCPCS

## 2021-08-26 PROCEDURE — 2580000003 HC RX 258: Performed by: STUDENT IN AN ORGANIZED HEALTH CARE EDUCATION/TRAINING PROGRAM

## 2021-08-26 PROCEDURE — 93308 TTE F-UP OR LMTD: CPT

## 2021-08-26 PROCEDURE — C9600 PERC DRUG-EL COR STENT SING: HCPCS | Performed by: INTERNAL MEDICINE

## 2021-08-26 PROCEDURE — 99223 1ST HOSP IP/OBS HIGH 75: CPT | Performed by: INTERNAL MEDICINE

## 2021-08-26 PROCEDURE — 85347 COAGULATION TIME ACTIVATED: CPT

## 2021-08-26 PROCEDURE — 6370000000 HC RX 637 (ALT 250 FOR IP): Performed by: STUDENT IN AN ORGANIZED HEALTH CARE EDUCATION/TRAINING PROGRAM

## 2021-08-26 PROCEDURE — 85730 THROMBOPLASTIN TIME PARTIAL: CPT

## 2021-08-26 PROCEDURE — 93459 L HRT ART/GRFT ANGIO: CPT | Performed by: INTERNAL MEDICINE

## 2021-08-26 PROCEDURE — 96366 THER/PROPH/DIAG IV INF ADDON: CPT

## 2021-08-26 PROCEDURE — 6360000004 HC RX CONTRAST MEDICATION

## 2021-08-26 PROCEDURE — 93005 ELECTROCARDIOGRAM TRACING: CPT | Performed by: STUDENT IN AN ORGANIZED HEALTH CARE EDUCATION/TRAINING PROGRAM

## 2021-08-26 PROCEDURE — 6360000002 HC RX W HCPCS

## 2021-08-26 PROCEDURE — 6370000000 HC RX 637 (ALT 250 FOR IP): Performed by: EMERGENCY MEDICINE

## 2021-08-26 PROCEDURE — 83735 ASSAY OF MAGNESIUM: CPT

## 2021-08-26 PROCEDURE — C1887 CATHETER, GUIDING: HCPCS

## 2021-08-26 PROCEDURE — C1894 INTRO/SHEATH, NON-LASER: HCPCS

## 2021-08-26 PROCEDURE — 2000000000 HC ICU R&B

## 2021-08-26 PROCEDURE — B2151ZZ FLUOROSCOPY OF LEFT HEART USING LOW OSMOLAR CONTRAST: ICD-10-PCS | Performed by: INTERNAL MEDICINE

## 2021-08-26 PROCEDURE — 6370000000 HC RX 637 (ALT 250 FOR IP)

## 2021-08-26 PROCEDURE — 6360000002 HC RX W HCPCS: Performed by: NURSE PRACTITIONER

## 2021-08-26 PROCEDURE — 2500000003 HC RX 250 WO HCPCS

## 2021-08-26 PROCEDURE — B2131ZZ FLUOROSCOPY OF MULTIPLE CORONARY ARTERY BYPASS GRAFTS USING LOW OSMOLAR CONTRAST: ICD-10-PCS | Performed by: INTERNAL MEDICINE

## 2021-08-26 PROCEDURE — 027035Z DILATION OF CORONARY ARTERY, ONE ARTERY WITH TWO DRUG-ELUTING INTRALUMINAL DEVICES, PERCUTANEOUS APPROACH: ICD-10-PCS | Performed by: INTERNAL MEDICINE

## 2021-08-26 RX ORDER — HYDRALAZINE HYDROCHLORIDE 20 MG/ML
10 INJECTION INTRAMUSCULAR; INTRAVENOUS EVERY 10 MIN PRN
Status: DISCONTINUED | OUTPATIENT
Start: 2021-08-26 | End: 2021-08-28

## 2021-08-26 RX ORDER — SODIUM CHLORIDE 9 MG/ML
25 INJECTION, SOLUTION INTRAVENOUS PRN
Status: DISCONTINUED | OUTPATIENT
Start: 2021-08-26 | End: 2021-08-27

## 2021-08-26 RX ORDER — NITROGLYCERIN 20 MG/100ML
5-200 INJECTION INTRAVENOUS CONTINUOUS
Status: DISCONTINUED | OUTPATIENT
Start: 2021-08-27 | End: 2021-08-28

## 2021-08-26 RX ORDER — SODIUM CHLORIDE 0.9 % (FLUSH) 0.9 %
5-40 SYRINGE (ML) INJECTION PRN
Status: DISCONTINUED | OUTPATIENT
Start: 2021-08-26 | End: 2021-08-28

## 2021-08-26 RX ORDER — HEPARIN SODIUM 10000 [USP'U]/100ML
5-30 INJECTION, SOLUTION INTRAVENOUS CONTINUOUS
Status: DISCONTINUED | OUTPATIENT
Start: 2021-08-27 | End: 2021-08-27

## 2021-08-26 RX ORDER — LIDOCAINE 4 G/G
1 PATCH TOPICAL DAILY
Status: DISCONTINUED | OUTPATIENT
Start: 2021-08-27 | End: 2021-08-28

## 2021-08-26 RX ORDER — ATORVASTATIN CALCIUM 80 MG/1
80 TABLET, FILM COATED ORAL NIGHTLY
Status: DISCONTINUED | OUTPATIENT
Start: 2021-08-26 | End: 2021-08-28

## 2021-08-26 RX ORDER — MORPHINE SULFATE 2 MG/ML
2 INJECTION, SOLUTION INTRAMUSCULAR; INTRAVENOUS ONCE
Status: COMPLETED | OUTPATIENT
Start: 2021-08-26 | End: 2021-08-26

## 2021-08-26 RX ORDER — SODIUM CHLORIDE 0.9 % (FLUSH) 0.9 %
5-40 SYRINGE (ML) INJECTION EVERY 12 HOURS SCHEDULED
Status: DISCONTINUED | OUTPATIENT
Start: 2021-08-26 | End: 2021-08-28

## 2021-08-26 RX ORDER — ASPIRIN 81 MG/1
81 TABLET, CHEWABLE ORAL DAILY
Status: DISCONTINUED | OUTPATIENT
Start: 2021-08-27 | End: 2021-08-28

## 2021-08-26 RX ORDER — ONDANSETRON 2 MG/ML
4 INJECTION INTRAMUSCULAR; INTRAVENOUS EVERY 6 HOURS PRN
Status: DISCONTINUED | OUTPATIENT
Start: 2021-08-26 | End: 2021-08-28

## 2021-08-26 RX ORDER — LABETALOL HYDROCHLORIDE 5 MG/ML
10 INJECTION, SOLUTION INTRAVENOUS EVERY 30 MIN PRN
Status: DISCONTINUED | OUTPATIENT
Start: 2021-08-26 | End: 2021-08-28

## 2021-08-26 RX ADMIN — ATORVASTATIN CALCIUM 80 MG: 80 TABLET, FILM COATED ORAL at 23:55

## 2021-08-26 RX ADMIN — ACETAMINOPHEN 650 MG: 325 TABLET ORAL at 19:33

## 2021-08-26 RX ADMIN — HEPARIN SODIUM 1590 UNITS: 1000 INJECTION INTRAVENOUS; SUBCUTANEOUS at 08:37

## 2021-08-26 RX ADMIN — CLOPIDOGREL 75 MG: 75 TABLET, FILM COATED ORAL at 11:41

## 2021-08-26 RX ADMIN — Medication 81 MG: at 11:41

## 2021-08-26 RX ADMIN — ISOSORBIDE DINITRATE 10 MG: 10 TABLET ORAL at 11:14

## 2021-08-26 RX ADMIN — MORPHINE SULFATE 2 MG: 2 INJECTION, SOLUTION INTRAMUSCULAR; INTRAVENOUS at 23:03

## 2021-08-26 RX ADMIN — METOPROLOL TARTRATE 25 MG: 25 TABLET ORAL at 11:15

## 2021-08-26 RX ADMIN — DILTIAZEM HYDROCHLORIDE 180 MG: 180 CAPSULE, COATED, EXTENDED RELEASE ORAL at 11:14

## 2021-08-26 RX ADMIN — SODIUM CHLORIDE, PRESERVATIVE FREE 10 ML: 5 INJECTION INTRAVENOUS at 23:00

## 2021-08-26 RX ADMIN — HEPARIN SODIUM AND DEXTROSE 10 UNITS/HR: 10000; 5 INJECTION INTRAVENOUS at 08:39

## 2021-08-26 ASSESSMENT — PAIN DESCRIPTION - DIRECTION: RADIATING_TOWARDS: CHEST

## 2021-08-26 ASSESSMENT — PAIN SCALES - GENERAL
PAINLEVEL_OUTOF10: 3
PAINLEVEL_OUTOF10: 1
PAINLEVEL_OUTOF10: 0
PAINLEVEL_OUTOF10: 0
PAINLEVEL_OUTOF10: 10
PAINLEVEL_OUTOF10: 1
PAINLEVEL_OUTOF10: 8

## 2021-08-26 ASSESSMENT — PAIN - FUNCTIONAL ASSESSMENT
PAIN_FUNCTIONAL_ASSESSMENT: PREVENTS OR INTERFERES SOME ACTIVE ACTIVITIES AND ADLS
PAIN_FUNCTIONAL_ASSESSMENT: PREVENTS OR INTERFERES SOME ACTIVE ACTIVITIES AND ADLS

## 2021-08-26 ASSESSMENT — PAIN DESCRIPTION - LOCATION
LOCATION: CHEST
LOCATION: NECK
LOCATION: BACK

## 2021-08-26 ASSESSMENT — PAIN DESCRIPTION - FREQUENCY
FREQUENCY: CONTINUOUS

## 2021-08-26 ASSESSMENT — PAIN DESCRIPTION - PROGRESSION
CLINICAL_PROGRESSION: NOT CHANGED

## 2021-08-26 ASSESSMENT — PAIN DESCRIPTION - ORIENTATION
ORIENTATION: MID;POSTERIOR
ORIENTATION: MID;POSTERIOR
ORIENTATION: MID

## 2021-08-26 ASSESSMENT — PAIN DESCRIPTION - ONSET
ONSET: ON-GOING

## 2021-08-26 ASSESSMENT — PAIN DESCRIPTION - DESCRIPTORS
DESCRIPTORS: PRESSURE
DESCRIPTORS: ACHING;DISCOMFORT
DESCRIPTORS: ACHING;DISCOMFORT

## 2021-08-26 ASSESSMENT — PAIN DESCRIPTION - PAIN TYPE
TYPE: ACUTE PAIN

## 2021-08-26 NOTE — CONSULTS
Attestation signed by      Attending Physician Statement:    I have discussed the care of  Ladd Crigler , including pertinent history and exam findings, with the Cardiology fellow/resident. I have seen and examined the patient and the key elements of all parts of the encounter have been performed by me. I agree with the assessment, plan and orders as documented by the fellow/resident, after I modified exam findings and plan of treatments, and the final version is my approved version of the assessment. Additional Comments: The patient was seen and examined agree with below. Presented with chest pain and non-ST elevation myocardial infarction. History of coronary artery bypass surgery x1. No records available. Currently on IV heparin and chest pain-free. Discussed proceeding with coronary angiography and possible PCI benefits risks and alternatives were explained and does agree to proceed. [facility]    Consult / H&P               Today's Date: 8/26/2021  Patient Name: Ladd Crigler  Date of admission: 8/25/2021 12:12 PM  Patient's age: 64 y.o., 1945  Admission Dx: Chest pain [R07.9]  Chest pain, unspecified type [R07.9]  NSTEMI (non-ST elevated myocardial infarction) ([facility] [address] [I21.4]    Reason for Consult:  Cardiac evaluation    Requesting Physician: Patsy Moya MD    CHIEF COMPLAINT:  Chest pain    History Obtained From:  patient    HISTORY OF PRESENT ILLNESS:      The patient is a 64 y.o.  female who is admitted to the hospital for chest pain. The patient stated the pain started at 11-11:30 yesterday morning. Patient stated pain was in the center radiating to the jaw. Patient rated pain at a 7/10 inially. Patient stated she took 4 baby aspirin at home. EMS gave the patient sublingual nitroglycerin and brought the patients pain to a 1/10. The patient had associated diaphoresis. The patient has a history of CAD.  The patient had a CABG at age 45 and a carotid endarderectomy at age 44. In the ED, the patients ProBNP was 1690. Troponin levels were 13->267->291->340->319. Chest xray showed calcific atherosclerotic disease aorta, no acute pulmonary disease. EKG Results below. Diagnostics:    EK2021 05:11  Normal sinus rhythm  Possible Left atrial enlargement  T wave abnormality, consider anterolateral ischemia  Prolonged QT  Abnormal ECG  When compared with ECG of 25-AUG-2021 21:59,  No significant change was found    2021 21:59  Normal sinus rhythm  Possible Left atrial enlargement  T wave abnormality, consider anterolateral ischemia  Abnormal ECG  When compared with ECG of 25-AUG-2021 17:48,  T wave inversion now evident in Lateral leads    2021 17:48  Normal sinus rhythm  Nonspecific T wave abnormality  Abnormal ECG  When compared with ECG of 25-AUG-2021 12:15,  ST no longer elevated in Inferior leads  ST no longer depressed in Anterior leads  Nonspecific T wave abnormality, worse in Anterolateral leads    ECHO: ordered, but not yet obtained. Past Medical History:   has a past medical history of Atherosclerosis, CAD (coronary artery disease), Hydatidiform mole, and Rosacea. Past Surgical History:   has a past surgical history that includes Carotid endarterectomy (age 44); Coronary artery bypass graft (age 45); Tonsillectomy and adenoidectomy (age 8); and Dilation & curettage (1971). Home Medications:    Prior to Admission medications    Medication Sig Start Date End Date Taking? Authorizing Provider   metoprolol (LOPRESSOR) 25 MG tablet Take 25 mg by mouth 2 times daily. Yes Historical Provider, MD   ISOSORBIDE Take 10 mg by mouth 2 times daily. Yes Historical Provider, MD   Atorvastatin Calcium (LIPITOR PO) Take 10 mg by mouth daily. Yes Historical Provider, MD   DILTIAZEM HCL Take 180 mg by mouth daily. Yes Historical Provider, MD   aspirin 81 MG EC tablet Take 81 mg by mouth daily.      Yes Historical Provider, MD BIOTIN Take 3,000 Units by mouth daily. Yes Historical Provider, MD      Current Facility-Administered Medications: clopidogrel (PLAVIX) tablet 75 mg, 75 mg, Oral, Daily  atorvastatin (LIPITOR) tablet 10 mg, 10 mg, Oral, Nightly  aspirin EC tablet 81 mg, 81 mg, Oral, Daily  metoprolol tartrate (LOPRESSOR) tablet 25 mg, 25 mg, Oral, BID  dilTIAZem (CARDIZEM CD) extended release capsule 180 mg, 180 mg, Oral, Daily  isosorbide dinitrate (ISORDIL) tablet 10 mg, 10 mg, Oral, BID  sodium chloride flush 0.9 % injection 5-40 mL, 5-40 mL, IntraVENous, 2 times per day  sodium chloride flush 0.9 % injection 5-40 mL, 5-40 mL, IntraVENous, PRN  0.9 % sodium chloride infusion, 25 mL, IntraVENous, PRN  acetaminophen (TYLENOL) tablet 650 mg, 650 mg, Oral, Q6H PRN **OR** acetaminophen (TYLENOL) suppository 650 mg, 650 mg, Rectal, Q6H PRN  heparin (porcine) injection 3,190 Units, 60 Units/kg, IntraVENous, PRN  heparin (porcine) injection 1,590 Units, 30 Units/kg, IntraVENous, PRN  heparin 25,000 units in dextrose 5% 250 mL (premix) infusion, 5-30 Units/kg/hr, IntraVENous, Continuous    Allergies:  Aspirin-acetaminophen-caffeine    Social History:   reports that she has never smoked. She has never used smokeless tobacco. She reports that she does not drink alcohol and does not use drugs. Family History: family history includes Other in her father, mother, paternal grandfather, and sister. REVIEW OF SYSTEMS:    · Constitutional: Positive for fatigue. There has been no unanticipated weight loss. · Eyes: No visual changes or diplopia. No scleral icterus. · Cardiovascular: See HPI  · Respiratory: No previous pulmonary problems, No cough  · Gastrointestinal: No abdominal pain. No change in bowel or bladder habits. · Genitourinary: No dysuria, trouble voiding, or hematuria. · Musculoskeletal:  No gait disturbance, No weakness or joint complaints. · Integumentary: No rash or pruritis.   · Neurological: No headache, diplopia, change in muscle strength, numbness or tingling. No change in gait, balance, coordination, mood, affect, memory, mentation, behavior. · Endocrine: No temperature intolerance. No excessive thirst, fluid intake, or urination. No tremor. · Hematologic/Lymphatic: No abnormal bruising or bleeding, blood clots or swollen lymph nodes. · Allergic/Immunologic: No nasal congestion or hives. PHYSICAL EXAM:      /71   Pulse 69   Temp 97.9 °F (36.6 °C) (Oral)   Resp 15   Ht 5' 1\" (1.549 m)   Wt 117 lb (53.1 kg)   SpO2 100%   BMI 22.11 kg/m²    Constitutional and General Appearance: alert, cooperative, no distress and appears stated age  HEENT: PERRL, no cervical lymphadenopathy. No masses palpable. Respiratory:  · Normal excursion and expansion without use of accessory muscles  · Resp Auscultation: Good respiratory effort. No for increased work of breathing. On auscultation: clear to auscultation bilaterally  Cardiovascular:  · The apical impulse is not displaced  · Heart tones are crisp and normal. regular S1 and S2.  · Jugular venous pulsation Normal  · The carotid upstroke is normal in amplitude and contour without delay or bruit  · Peripheral pulses are symmetrical and full   Abdomen:   · No masses or tenderness  · Bowel sounds present  Extremities:  ·  No Cyanosis or Clubbing  ·  Lower extremity edema: No  ·  Skin: Warm and dry  Neurological:  · Alert and oriented. · Moves all extremities well  · No abnormalities of mood, affect, memory, mentation, or behavior are noted     Labs:     CBC:   Recent Labs     08/25/21  1956 08/26/21  0330   WBC 6.4 5.3   HGB 13.6 12.4   HCT 41.3 38.8    158     BMP:   Recent Labs     08/25/21  1235 08/26/21  0330    141   K 4.7 4.4   CO2 22 22   BUN 18 18   CREATININE 0.93* 0.99*   LABGLOM 58* 54*   GLUCOSE 107* 86     BNP: No results for input(s): BNP in the last 72 hours. PT/INR: No results for input(s): PROTIME, INR in the last 72 hours.   APTT:  Recent Labs     08/25/21  2353 08/26/21  0712   APTT 107.6* 44.1*     CARDIAC ENZYMES:No results for input(s): CKTOTAL, CKMB, CKMBINDEX, TROPONINI in the last 72 hours. FASTING LIPID PANEL:  Lab Results   Component Value Date    HDL 70 05/07/2021    TRIG 76 10/28/2015     LIVER PROFILE:  Recent Labs     08/25/21  1235   AST 27   ALT 11   LABALBU 4.4       IMPRESSION:    Patient Active Problem List   Diagnosis    Hydatidiform mole    Chest pain    NSTEMI (non-ST elevated myocardial infarction) ([facility] [address]     1. NSTEMI  2. History of CAD s/p CABG    RECOMMENDATIONS:  1. Cardiac Cath  2. Continue heparin infusion. 3. Continue Atorvastatin, Cardizem, Lopressor, and Aspirin. Nitrostat SL PRN. 4. Keep k>4, Mg>2    Discussed with patient. Oksana Medellin MD  Internal Medicine Resident, PGY-2  [facility];  [place], [place]  8/26/2021,10:27 AM      Electronically signed by Oksana Medellin on 8/26/2021 at [facility]      [phone]

## 2021-08-26 NOTE — PLAN OF CARE
Problem: SAFETY  Goal: Free from accidental physical injury  8/26/2021 0409 by Esthela Hancock RN  Outcome: Ongoing  8/25/2021 1807 by Migue Westfall RN  Outcome: Ongoing     Problem: PAIN  Goal: Patient's pain/discomfort is manageable  8/26/2021 0409 by Esthela Hancock RN  Outcome: Ongoing  8/25/2021 1807 by Migue Westfall RN  Outcome: Ongoing     Problem: Pain:  Goal: Pain level will decrease  Description: Pain level will decrease  Outcome: Ongoing  Goal: Control of acute pain  Description: Control of acute pain  Outcome: Ongoing  Goal: Control of chronic pain  Description: Control of chronic pain  Outcome: Ongoing

## 2021-08-26 NOTE — OP NOTE
[facility]    CARDIAC CATHETERIZATION    Date:   8/26/2021  Patient name:  Armen Seen  Date of admission:  8/25/2021 12:12 PM  MRN:   [id number]  YOB: 1945    Operators:  Primary:   Lizz Amaya MD (Attending Physician)    Assistant/CV fellow: Jose F Thakur MD      Procedure performed:     [x] Left Heart Catheterization. [x] Graft Angiography. [x] Left Ventriculography. [] Right Heart Catheterization. [x] Coronary Angiography. [] Aortic Valve Studies. [] PCI:      [] Other:       Pre Procedure Conscious Sedation Data:  ASA Class:    [] I [] II [] III [x] IV    Mallampati Class:  [] I [x] II [] III [] IV      Indication:  [] STEMI      [] + Stress test  [x] ACS      [] + EKG Changes  [x] Non Q MI       [] Significant Risk Factors  [] Recurrent Angina             [] Diabetes Mellitus    [] New LBBB      [] Other.  [] CHF / Low EF changes     [] Abnormal CTA / Ca Score      Procedure:  Access:  [x] Femoral  [] Radial  artery       [x] Right  [] Left    Procedure: After informed consent was obtained with explanation of the risks and benefits, patient was brought to the cath lab. The access area was prepped and draped in sterile fashion. 1% lidocaine was used for local block. The artery was cannulated with 6  Fr sheath with brisk arterial blood return. The side port was frequently flushed and aspirated with normal saline. Estimated Blood Loss:  [x] Minimal < 25 cc [] Moderate 25-50 cc  [] >50 cc    Findings:      LMCA: Normal 0% stenosis. LAD: Multiple stenosis.       Lesion on Mid LAD: 90% stenosis 50 mm length reduced to 0%. Pre procedure     JULIETH III flow was noted. Post Procedure JULIETH III flow was present. Good     runoff was present. The lesion was diagnosed as High Risk (C). The lesion     was diffuse, eccentric and heavily calcified. The lesion showed with     irregular contour, moderate angulation and moderate tortuosity.         Comments:Post NC post dilation grade III perforation noted with     extravasation into pericardial space. 2 Covered stents were used to seal     perforation with good results.     Devices used         - Luge Wire 182 cm. Number of passes: 1.         - Mini Trek Balloon 2.5mm x 30mm. 2 inflation(s) to a max pressure of:     14 [place].         - Xience Claudia 3.0 x 38 KVNG. 1 inflation(s) to a max pressure of: 16     [place].         - NC Trek 3.25mm x 15mm. 4 inflation(s) to a max pressure of: 18 [place].         - Papyrus Covered Stent 3.0 x 26. 1 inflation(s) to a max pressure of:     14 [place].         - Papyrus Covered Stent 3.0 x 15. 1 inflation(s) to a max pressure of:     10 [place].         - Xience Claudia 2.75 x 28 KVNG. 1 inflation(s) to a max pressure of: 12     [place].         Lesion on Prox LAD: 90% stenosis 24 mm length reduced to 0%. Pre     procedure JULIETH III flow was noted. Post Procedure JULIETH III flow was     present. Good runoff was present. The lesion was diagnosed as High Risk     (C).       Devices used         - Xience Claudia 3.25 x 28 KVNG. 1 inflation(s) to a max pressure of: 16     [place].         - 6 Fr. Guideliner Catheter. Number of passes: 1.       LCx: Diffuse irregularities 20-30%. RCA: Diffuse irregularities 20-30%. Small. non-dominant. Graft Lesions         Lesion on Aorta Left to Mid LAD: Proximal body. 90% stenosis.       Lesion on Aorta Left to Mid LAD: Middle body. 95% stenosis.       Lesion on Aorta Left to Mid LAD: Distal body. 90% stenosis.       Cardiac Grafts        - Omega Shrestha is a Vein graft that originates at the Aorta Left and attaches to       the Mid LAD. Multiple stenosis                 The LV gram was performed in the PAYNE 30 position. LVEF: 35 %      Conclusions:         Severe diffuse heavily calcified proximal and mid Long LAD stenosis.     SVG-LAD is degenerated with multiple 90-95% stenosis and non-functional.    Minimal LCX and RCA disease.    Moderately impaired ventricular function.    PTCA and KVNG to proximal and mid LAD resulted in grade III perforation,    hemodynamic compromise responded to 2 covered stents to seal perforation    followed by hemodynamic stability    Echo showed moderate pericardial effusion with no signs of tamponade.    Pericadiocentesis was performed with removal of about 40 cc of bloody    fluids.    Final angiogram showed JULIETH III flow in LAD with 0% residual stenosis.         Recommendations:         Routine Post PCI Orders.    Medical therapy as needed.    Risk factor modification.    Repeat limited echo in few hours.  CCU admission     ____________________________________________________________________    History and Risk Factors    [x] Hypertension     [] Family history of CAD  [x] Hyperlipidemia     [] Cerebrovascular Disease   [] Prior MI       [] Peripheral Vascular disease   [] Prior PCI              [] Diabetes Mellitus    [] Left Main PCI. [] Currently on Dialysis. [x] Prior CABG. [] Currently smoker. [] Cardiac Arrest outside of healthcare facility. [] Yes    [] No        Witnessed     [] Yes   [] No     Arrest after arrival of EMS  [] Yes   [] No     [] Cardiac Arrest at other Facility. [] Yes   [] No    Pre-Procedure Information. Heart Failure       [] Yes    [x] No        Class  [] I      [] II  [] III    [] IV. New Diagnosis    [] Yes  [] No    HF Type      [] Systolic   [] Diastolic          [] Unknown. Diagnostic Test:   EKG       [] Normal   [x] Abnormal    New antiarrhythmia medications:    [] Yes   [] No   New onset atrial fibrillation / Flutter     [] Yes   [] No   ECG Abnormalities:      [] V. Fib   [] Bernice V. Tach           [] NS V. T   [] New LBBB           [] T.  Inv  []  ST dev > 0.5 mm         [] PVC's freq  [] PVC's infrequent    Stress Test Performed:      [] Yes    [x] No     Type:     [] Stress Echo   [] Exercise Stress Test (no imaging)      [] Stress Nuclear  [] Stress Imaging     Results   [] Negative   [] Positive [] Indeterminate  [] Unavailable     If Positive/ Risk / Extent of Ischemia:       [] Low  [] Intermediate         [] High  [] Unavailable      Cardiac CTA Performed:     [] Yes    [x] No      Results   [] CAD   [] Non obstructive CAD      [] No CAD   [] Uncertain      [] Unknown   [] Structural Disease. Pre Procedure Medications:   [x] Yes    [] No         [x] ASA   [x] Beta Blockers      [] Nitrate   [] Ca Channel Blockers      [] Ranolazine   [x] Statin       [] Plavix/Others antiplatelets      Electronically signed on 8/26/2021 at 2:24 PM by:    Yao Kahn MD  Fellow, [address]      Physician Statement  I have discussed the case of Rut Adams including pertinent history and exam findings with the resident. I have seen and examined the patient and the key elements of the encounter have been performed by me. I agree with the assessment, plan and orders as documented by the resident With changes made to the note. Procedure performed by me.     Electronically signed by Laury Gaucher, MD on 8/26/2021 at 6:32 PM.    [facility]      [phone]

## 2021-08-26 NOTE — H&P
[address] [facility]     Department of Internal Medicine - Staff Internal Medicine Teaching Service          ADMISSION NOTE/HISTORY AND PHYSICAL EXAMINATION   Date: 8/25/2021  Patient Name: Henry Son  Date of admission: 8/25/2021 12:12 PM  YOB: 1945  PCP: No primary care provider on file. History Obtained From: patient and EMR    CHIEF COMPLAINT     Chief complaint: chest pain    HISTORY OF PRESENTING ILLNESS     The patient is a pleasant 64 y.o. female presents with a chief complaint of chest pain in the center of the chest, 7/10 in intensity and radiating to the jaw. Patient received aspirin  and sublingual nitro which decreased her chest pain to 1/10. She denies any dyspnea or diaphoresis. Patient has history of coronary artery disease. The patient denies any fever, cough, lung disease, orthopnea, PND, headache, nasal discharge, abdominal pain, diarrhea, urinary symptoms. Patient has history of coronary artery disease and undergone coronary artery bypass grafting at age 45, carotid endarterectomy at age 44. 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 Patient takes aspirin 81 mg, Plavix 75 mg daily, atorvastatin 10 mg daily, biotin, diltiazem 180 mg daily, metoprolol 25 mg twice daily and isosorbide 10 mg by mouth twice daily. On arrival vitals are temp 98.3, heart rate 61, /70 saturating on room air. Pertinent labs: Sodium 141 potassium 4.7 creatinine 0.93, proBNP 1690, Trop 13, glucose 107, WBC 5.5, Hb 13.4, chest x-ray no acute pulmonary disease and ECG nonspecific T wave abnormality. Her repeat Trop level are 267, 291. During my examination patient is pain-free, not in any distress, chest is clear without wheezes/crackles, heart sounds normal, no murmurs appreciated, abdomen soft, nontender and no pedal edema. Patient is admitted as a case of NSTEMI, will consult cardiology.     Review of Systems:  General ROS: Completed and except as mentioned above were negative   Hematological and Lymphatic ROS:  Completed and except as mentioned above were negative  Respiratory ROS:  Completed and except as mentioned above were negative  Cardiovascular ROS:  Completed and except as mentioned above were negative  Gastrointestinal ROS: Completed and except as mentioned above were negative  Genito-Urinary ROS:  Completed and except as mentioned above were negative  Musculoskeletal ROS:  Completed and except as mentioned above were negative  Neurological ROS:  Completed and except as mentioned above were negative    PAST MEDICAL HISTORY     Past Medical History:   Diagnosis Date    Atherosclerosis     requiring carotid endarterectomy    CAD (coronary artery disease)     Hydatidiform mole     no chemo or radiation    Rosacea        PAST SURGICAL HISTORY     Past Surgical History:   Procedure Laterality Date    CAROTID ENDARTERECTOMY  age 44   Ardy[facility] CORONARY ARTERY BYPASS GRAFT  age 45   [facility] [address]    Hydatiform mole pregnancy    TONSILLECTOMY AND ADENOIDECTOMY  age 8       ALLERGIES     Aspirin-acetaminophen-caffeine    [address]     Prior to Admission medications    Medication Sig Start Date End Date Taking? Authorizing Provider   metoprolol (LOPRESSOR) 25 MG tablet Take 25 mg by mouth 2 times daily. Yes Historical Provider, MD   ISOSORBIDE Take 10 mg by mouth 2 times daily. Yes Historical Provider, MD   Atorvastatin Calcium (LIPITOR PO) Take 10 mg by mouth daily. Yes Historical Provider, MD   DILTIAZEM HCL Take 180 mg by mouth daily. Yes Historical Provider, MD   aspirin 81 MG EC tablet Take 81 mg by mouth daily. Yes Historical Provider, MD   BIOTIN Take 3,000 Units by mouth daily.    Yes Historical Provider, MD       SOCIAL HISTORY     Tobacco: denies any tobacco use in past 10 years  Alcohol: denies alcohol use  Illicits: denies  Occupation:     FAMILY HISTORY     Family History Problem Relation Age of Onset    Other Paternal Grandfather         CAD    Other Father         CAD, brain aneurysm    Other Mother         asthma, arthritis, thyroid disease    Other Sister         breast cancer, bowel disease       PHYSICAL EXAM     Vitals: /72   Pulse 100   Temp 97.5 °F (36.4 °C) (Oral)   Resp 19   Ht 5' 1\" (1.549 m)   Wt 117 lb (53.1 kg)   SpO2 98%   BMI 22.11 kg/m²   Tmax: Temp (24hrs), Av.8 °F (36.6 °C), Min:97.5 °F (36.4 °C), Max:98.3 °F (36.8 °C)    Last Body weight:   Wt Readings from Last 3 Encounters:   21 117 lb (53.1 kg)   12 115 lb 6.4 oz (52.3 kg)     Body Mass Index : Body mass index is 22.11 kg/m². PHYSICAL EXAMINATION:  Constitutional: thin elderly lady who is alert, oriented, cooperative and in no apparent distress. Head:normocephalic and atraumatic. EENT:  PERRLA. No conjunctival injections. Septum was midline, mucosa was without erythema, exudates or cobblestoning. No thrush was noted. Respiratory: Chest was symmetrical without dullness to percussion. Breath sounds bilaterally were clear to auscultation. There were no wheezes, rhonchi or rales. There is no intercostal retraction or use of accessory muscles. No egophony noted. Cardiovascular: Regular without murmur, clicks, gallops or rubs. Abdomen: Slightly rounded and soft without organomegaly. No rebound, rigidity or guarding was appreciated. Extremities:  No lower extremity edema, ulcerations, tenderness, varicosities or erythema. Muscle size, tone and strength are normal.  No involuntary movements are noted. Skin:  Warm and dry. Good color, turgor and pigmentation. No lesions or scars.   No cyanosis or clubbing  Neurological/Psychiatric: The patient's general behavior, level of consciousness, thought content and emotional status is normal.          INVESTIGATIONS     Laboratory Testing:     Recent Results (from the past 24 hour(s))   CBC Auto Differential Collection Time: 08/25/21 12:35 PM   Result Value Ref Range    WBC 5.5 3.5 - 11.3 k/uL    RBC 4.27 3.95 - 5.11 m/uL    Hemoglobin 13.4 11.9 - 15.1 g/dL    Hematocrit 42.1 36.3 - 47.1 %    MCV 98.6 82.6 - 102.9 fL    MCH 31.4 25.2 - 33.5 pg    MCHC 31.8 28.4 - 34.8 g/dL    RDW 12.3 11.8 - 14.4 %    Platelets 874 081 - 426 k/uL    MPV 11.7 8.1 - 13.5 fL    NRBC Automated 0.0 0.0 per 100 WBC    Differential Type NOT REPORTED     Seg Neutrophils 54 36 - 65 %    Lymphocytes 31 24 - 43 %    Monocytes 11 3 - 12 %    Eosinophils % 3 1 - 4 %    Basophils 1 0 - 2 %    Immature Granulocytes 0 0 %    Segs Absolute 2.99 1.50 - 8.10 k/uL    Absolute Lymph # 1.70 1.10 - 3.70 k/uL    Absolute Mono # 0.61 0.10 - 1.20 k/uL    Absolute Eos # 0.16 0.00 - 0.44 k/uL    Basophils Absolute 0.05 0.00 - 0.20 k/uL    Absolute Immature Granulocyte <0.03 0.00 - 0.30 k/uL    WBC Morphology NOT REPORTED     RBC Morphology NOT REPORTED     Platelet Estimate NOT REPORTED    Comprehensive Metabolic Panel w/ Reflex to MG    Collection Time: 08/25/21 12:35 PM   Result Value Ref Range    Glucose 107 (H) 70 - 99 mg/dL    BUN 18 8 - 23 mg/dL    CREATININE 0.93 (H) 0.50 - 0.90 mg/dL    Bun/Cre Ratio NOT REPORTED 9 - 20    Calcium 9.4 8.6 - 10.4 mg/dL    Sodium 141 135 - 144 mmol/L    Potassium 4.7 3.7 - 5.3 mmol/L    Chloride 104 98 - 107 mmol/L    CO2 22 20 - 31 mmol/L    Anion Gap 15 9 - 17 mmol/L    Alkaline Phosphatase 63 35 - 104 U/L    ALT 11 5 - 33 U/L    AST 27 <32 U/L    Total Bilirubin 0.68 0.3 - 1.2 mg/dL    Total Protein 6.8 6.4 - 8.3 g/dL    Albumin 4.4 3.5 - 5.2 g/dL    Albumin/Globulin Ratio 1.8 1.0 - 2.5    GFR Non- 58 (L) >60 mL/min    GFR African American >60 >60 mL/min    GFR Comment          GFR Staging NOT REPORTED    Troponin    Collection Time: 08/25/21 12:35 PM   Result Value Ref Range    Troponin, High Sensitivity 13 0 - 14 ng/L    Troponin T NOT REPORTED <0.03 ng/mL    Troponin Interp NOT REPORTED    Lipase atorvastatin 10 mg, Cardizem  mg daily and Lopressor 25 mg daily. Nitrostat SL when needed. Continue on aspirin 81 mg.  · Trend Trop for 3 occurrences    Coronary artery disease S/P post CABG:  · Consult cardiology,  · Probable cardiac cath  · Continue above medication      DVT ppx: On heparin infusion  GI ppx: Not needed    PT/OT/SW: Following  Discharge Planning: On board    Aisha Silverman MD  Internal Medicine Resident, PGY-1  [facility];  [place], [place]  8/25/2021, 8:55 PM

## 2021-08-26 NOTE — PROGRESS NOTES
[facility]  Internal Medicine Teaching Residency Program  Inpatient Daily Progress Note  ______________________________________________________________________________    Patient: Brittani Roldan  YOB: 1945   ZB    Acct: [de-identified]     Room: [address] [place] [address]  Admit date: 2021  Today's date: 21  Number of days in the hospital: 0    SUBJECTIVE   Admitting Diagnosis: <principal problem not specified>  CC:     Pt examined at bedside. Chart & results reviewed. No acute issues over night  Vitals stable  Chest pain 1/10, no dyspnea/ tachypnea  Continue heparin infusion  Follow cardio recomm  Continue oral meds    ROS:  Constitutional:  negative for chills, fevers, sweats  Respiratory:  negative for cough, dyspnea on exertion, hemoptysis, shortness of breath, wheezing  Cardiovascular: positive for chest pain, negative chest pressure/discomfort, lower extremity edema, palpitations  Gastrointestinal:  negative for abdominal pain, constipation, diarrhea, nausea, vomiting  Neurological:  negative for dizziness, headache  BRIEF HISTORY     The patient is a pleasant 64 y.o. female presents with a chief complaint of chest pain in the center of the chest, 7/10 in intensity and radiating to the jaw. Patient received aspirin  and sublingual nitro which decreased her chest pain to 1/10. She denies any dyspnea or diaphoresis. Patient has history of coronary artery disease.     The patient denies any fever, cough, lung disease, orthopnea, PND, headache, nasal discharge, abdominal pain, diarrhea, urinary symptoms.     Patient has history of coronary artery disease and undergone coronary artery bypass grafting at age 45, carotid endarterectomy at age 44.   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     Patient takes aspirin 81 mg, Plavix 75 mg daily, atorvastatin 10 mg daily, biotin, diltiazem 180 mg daily, metoprolol 25 mg twice daily and isosorbide 10 mg by mouth twice daily.     On arrival vitals are temp 98.3, heart rate 61, /70 saturating on room air. Pertinent labs: Sodium 141 potassium 4.7 creatinine 0.93, proBNP 1690, Trop 13, glucose 107, WBC 5.5, Hb 13.4, chest x-ray no acute pulmonary disease and ECG nonspecific T wave abnormality. Her repeat Trop level are 267, 291.     During my examination patient is pain-free, not in any distress, chest is clear without wheezes/crackles, heart sounds normal, no murmurs appreciated, abdomen soft, nontender and no pedal edema.     Patient is admitted as a case of NSTEMI, will consult cardiology. OBJECTIVE     Vital Signs:  /71   Pulse 69   Temp 97.9 °F (36.6 °C) (Oral)   Resp 15   Ht 5' 1\" (1.549 m)   Wt 117 lb (53.1 kg)   SpO2 100%   BMI 22.11 kg/m²     Temp (24hrs), Av.8 °F (36.6 °C), Min:97.5 °F (36.4 °C), Max:98.3 °F (36.8 °C)    No intake/output data recorded. Physical Exam:  Constitutional: Alert, oriented, cooperative and in no apparent distress. Head:normocephalic and atraumatic. EENT:  PERRLA. No conjunctival injections. Septum was midline, mucosa was without erythema, exudates or cobblestoning. No thrush was noted. Neck: Supple without thyromegaly. No elevated JVP. Trachea was midline. Respiratory: Chest was symmetrical without dullness to percussion. Breath sounds bilaterally were clear to auscultation. There were no wheezes, rhonchi or rales. There is no intercostal retraction or use of accessory muscles. No egophony noted. Cardiovascular: Regular without murmur, clicks, gallops or rubs. Abdomen: Slightly rounded and soft without organomegaly. No rebound, rigidity or guarding was appreciated. Extremities:  No lower extremity edema, ulcerations, tenderness, varicosities or erythema. Muscle size, tone and strength are normal.  No involuntary movements are noted. Skin:  Warm and dry. Good color, turgor and pigmentation.  No lesions or scars. No cyanosis or clubbing  Neurological/Psychiatric: The patient's general behavior, level of consciousness, thought content and emotional status is normal.        Medications:  Scheduled Medications:    clopidogrel  75 mg Oral Daily    atorvastatin  10 mg Oral Nightly    aspirin  81 mg Oral Daily    metoprolol tartrate  25 mg Oral BID    dilTIAZem  180 mg Oral Daily    isosorbide dinitrate  10 mg Oral BID    sodium chloride flush  5-40 mL IntraVENous 2 times per day     Continuous Infusions:    sodium chloride      heparin (PORCINE) Infusion 10 Units/hr (08/26/21 0839)     PRN Medicationssodium chloride flush, 5-40 mL, PRN  sodium chloride, 25 mL, PRN  acetaminophen, 650 mg, Q6H PRN   Or  acetaminophen, 650 mg, Q6H PRN  heparin (porcine), 60 Units/kg, PRN  heparin (porcine), 30 Units/kg, PRN        Diagnostic Labs:  CBC:   Recent Labs     08/25/21  1235 08/25/21 1956 08/26/21  0330   WBC 5.5 6.4 5.3   RBC 4.27 4.34 3.99   HGB 13.4 13.6 12.4   HCT 42.1 41.3 38.8   MCV 98.6 95.2 97.2   RDW 12.3 12.3 12.5    167 158     BMP:   Recent Labs     08/25/21  1235 08/26/21  0330    141   K 4.7 4.4    106   CO2 22 22   BUN 18 18   CREATININE 0.93* 0.99*     BNP: No results for input(s): BNP in the last 72 hours. PT/INR: No results for input(s): PROTIME, INR in the last 72 hours. APTT:   Recent Labs     08/25/21 1956 08/25/21  2353 08/26/21  0712   APTT 24.8 107.6* 44.1*     CARDIAC ENZYMES: No results for input(s): CKMB, CKMBINDEX, TROPONINI in the last 72 hours. Invalid input(s): CKTOTAL;3  FASTING LIPID PANEL:  Lab Results   Component Value Date    CHOL 173 10/28/2015    HDL 70 05/07/2021    TRIG 76 10/28/2015     LIVER PROFILE:   Recent Labs     08/25/21  1235   AST 27   ALT 11   BILITOT 0.68   ALKPHOS 63      MICROBIOLOGY: No results found for: CULTURE    Imaging:    XR CHEST PORTABLE    Result Date: 8/25/2021  No acute pulmonary disease. Calcific atherosclerotic disease aorta. ASSESSMENT & PLAN     IMPRESSION  This is a 64 y.o. female who presented with typical chest pain that is relieved with nitro and found to have T wave changes on ECG and elevated Trop level. Patient admitted to inpatient as NSTEMI, cardiology consulted and they are planning for cardiac cath     NSTEMI:  · Patient has been pain-free now  · Continue heparin infusion  · Continue atorvastatin 10 mg, Cardizem  mg daily and Lopressor 25 mg daily. Nitrostat SL when needed. Continue on aspirin 81 mg.  · CARDIAC cath per Cardio       Coronary artery disease S/P post CABG:  · Consult cardiology  · cardiac cath planned per cardio  · Continue above medication        DVT ppx: On heparin infusion  GI ppx: Not needed     PT/OT/SW: Following  Discharge Planning: On board    Shantel Saldaña MD  Internal Medicine Resident, PGY-1  [address], [place]  8/26/2021, 9:58 AM

## 2021-08-26 NOTE — PROGRESS NOTES
[facility]  CDU / OBSERVATION eNCOUnter  Attending NOte       I performed a history and physical examination of the patient and discussed management with the resident. I reviewed the residents note and agree with the documented findings and plan of care. Any areas of disagreement are noted on the chart. I was personally present for the key portions of any procedures. I have documented in the chart those procedures where I was not present during the key portions. I have reviewed the nurses notes. I agree with the chief complaint, past medical history, past surgical history, allergies, medications, social and family history as documented unless otherwise noted below. The Family history, social history, and ROS are effectively unchanged since admission unless noted elsewhere in the chart. 58-year-old female admitted for chest pain radiating to the jaw. Troponin jumped to 267. Cardiology consulted. Transfer to internal medicine for NSTEMI.      Mya Reeves MD  Attending Emergency  Physician

## 2021-08-27 ENCOUNTER — APPOINTMENT (OUTPATIENT)
Dept: GENERAL RADIOLOGY | Age: 76
DRG: 270 | End: 2021-08-27
Payer: MEDICARE

## 2021-08-27 LAB
ABSOLUTE EOS #: 0 K/UL (ref 0–0.44)
ABSOLUTE IMMATURE GRANULOCYTE: 0 K/UL (ref 0–0.3)
ABSOLUTE LYMPH #: 0.47 K/UL (ref 1.1–3.7)
ABSOLUTE MONO #: 0.59 K/UL (ref 0.1–1.2)
ALBUMIN SERPL-MCNC: 4.1 G/DL (ref 3.5–5.2)
ALBUMIN SERPL-MCNC: 4.1 G/DL (ref 3.5–5.2)
ALBUMIN/GLOBULIN RATIO: 1.6 (ref 1–2.5)
ALBUMIN/GLOBULIN RATIO: 2 (ref 1–2.5)
ALP BLD-CCNC: 53 U/L (ref 35–104)
ALP BLD-CCNC: 55 U/L (ref 35–104)
ALT SERPL-CCNC: 28 U/L (ref 5–33)
ALT SERPL-CCNC: 33 U/L (ref 5–33)
ANION GAP SERPL CALCULATED.3IONS-SCNC: 23 MMOL/L (ref 9–17)
ANION GAP SERPL CALCULATED.3IONS-SCNC: 23 MMOL/L (ref 9–17)
ANION GAP SERPL CALCULATED.3IONS-SCNC: 25 MMOL/L (ref 9–17)
AST SERPL-CCNC: 170 U/L
AST SERPL-CCNC: 191 U/L
BASOPHILS # BLD: 0 % (ref 0–2)
BASOPHILS ABSOLUTE: 0 K/UL (ref 0–0.2)
BILIRUB SERPL-MCNC: 0.6 MG/DL (ref 0.3–1.2)
BILIRUB SERPL-MCNC: 0.63 MG/DL (ref 0.3–1.2)
BILIRUBIN DIRECT: 0.12 MG/DL
BILIRUBIN URINE: NEGATIVE
BILIRUBIN, INDIRECT: 0.48 MG/DL (ref 0–1)
BUN BLDV-MCNC: 17 MG/DL (ref 8–23)
BUN BLDV-MCNC: 20 MG/DL (ref 8–23)
BUN BLDV-MCNC: 23 MG/DL (ref 8–23)
BUN/CREAT BLD: ABNORMAL (ref 9–20)
CALCIUM SERPL-MCNC: 8.8 MG/DL (ref 8.6–10.4)
CALCIUM SERPL-MCNC: 8.8 MG/DL (ref 8.6–10.4)
CALCIUM SERPL-MCNC: 9 MG/DL (ref 8.6–10.4)
CHLORIDE BLD-SCNC: 103 MMOL/L (ref 98–107)
CHLORIDE BLD-SCNC: 104 MMOL/L (ref 98–107)
CHLORIDE BLD-SCNC: 104 MMOL/L (ref 98–107)
CO2: 14 MMOL/L (ref 20–31)
COLOR: YELLOW
COMMENT UA: ABNORMAL
CREAT SERPL-MCNC: 0.9 MG/DL (ref 0.5–0.9)
CREAT SERPL-MCNC: 0.91 MG/DL (ref 0.5–0.9)
CREAT SERPL-MCNC: 0.94 MG/DL (ref 0.5–0.9)
DIFFERENTIAL TYPE: ABNORMAL
EKG ATRIAL RATE: 63 BPM
EKG ATRIAL RATE: 87 BPM
EKG P AXIS: 113 DEGREES
EKG P AXIS: 66 DEGREES
EKG P-R INTERVAL: 114 MS
EKG P-R INTERVAL: 142 MS
EKG Q-T INTERVAL: 408 MS
EKG Q-T INTERVAL: 452 MS
EKG QRS DURATION: 54 MS
EKG QRS DURATION: 70 MS
EKG QTC CALCULATION (BAZETT): 417 MS
EKG QTC CALCULATION (BAZETT): 543 MS
EKG R AXIS: 41 DEGREES
EKG R AXIS: 69 DEGREES
EKG T AXIS: -82 DEGREES
EKG T AXIS: 29 DEGREES
EKG VENTRICULAR RATE: 63 BPM
EKG VENTRICULAR RATE: 87 BPM
EOSINOPHILS RELATIVE PERCENT: 0 % (ref 1–4)
GFR AFRICAN AMERICAN: >60 ML/MIN
GFR NON-AFRICAN AMERICAN: 58 ML/MIN
GFR NON-AFRICAN AMERICAN: 60 ML/MIN
GFR NON-AFRICAN AMERICAN: >60 ML/MIN
GFR SERPL CREATININE-BSD FRML MDRD: ABNORMAL ML/MIN/{1.73_M2}
GLOBULIN: ABNORMAL G/DL (ref 1.5–3.8)
GLUCOSE BLD-MCNC: 105 MG/DL (ref 70–99)
GLUCOSE BLD-MCNC: 155 MG/DL (ref 70–99)
GLUCOSE BLD-MCNC: 180 MG/DL (ref 70–99)
GLUCOSE URINE: NEGATIVE
HCT VFR BLD CALC: 36.2 % (ref 36.3–47.1)
HCT VFR BLD CALC: 36.7 % (ref 36.3–47.1)
HEMOGLOBIN: 11.4 G/DL (ref 11.9–15.1)
HEMOGLOBIN: 11.9 G/DL (ref 11.9–15.1)
IMMATURE GRANULOCYTES: 0 %
KETONES, URINE: ABNORMAL
LEUKOCYTE ESTERASE, URINE: NEGATIVE
LIPASE: 10 U/L (ref 13–60)
LYMPHOCYTES # BLD: 4 % (ref 24–43)
MAGNESIUM: 1.8 MG/DL (ref 1.6–2.6)
MAGNESIUM: 1.9 MG/DL (ref 1.6–2.6)
MCH RBC QN AUTO: 30.8 PG (ref 25.2–33.5)
MCH RBC QN AUTO: 31.6 PG (ref 25.2–33.5)
MCHC RBC AUTO-ENTMCNC: 31.5 G/DL (ref 28.4–34.8)
MCHC RBC AUTO-ENTMCNC: 32.4 G/DL (ref 28.4–34.8)
MCV RBC AUTO: 97.6 FL (ref 82.6–102.9)
MCV RBC AUTO: 97.8 FL (ref 82.6–102.9)
MONOCYTES # BLD: 5 % (ref 3–12)
MORPHOLOGY: NORMAL
NITRITE, URINE: NEGATIVE
NRBC AUTOMATED: 0 PER 100 WBC
NRBC AUTOMATED: 0 PER 100 WBC
PARTIAL THROMBOPLASTIN TIME: 23.1 SEC (ref 20.5–30.5)
PARTIAL THROMBOPLASTIN TIME: 24.2 SEC (ref 20.5–30.5)
PARTIAL THROMBOPLASTIN TIME: 50.8 SEC (ref 20.5–30.5)
PARTIAL THROMBOPLASTIN TIME: 64.3 SEC (ref 20.5–30.5)
PDW BLD-RTO: 12.7 % (ref 11.8–14.4)
PDW BLD-RTO: 12.8 % (ref 11.8–14.4)
PH UA: 5.5 (ref 5–8)
PLATELET # BLD: 151 K/UL (ref 138–453)
PLATELET # BLD: 160 K/UL (ref 138–453)
PLATELET ESTIMATE: ABNORMAL
PMV BLD AUTO: 11.4 FL (ref 8.1–13.5)
PMV BLD AUTO: 11.8 FL (ref 8.1–13.5)
POTASSIUM SERPL-SCNC: 3.6 MMOL/L (ref 3.7–5.3)
POTASSIUM SERPL-SCNC: 4.1 MMOL/L (ref 3.7–5.3)
POTASSIUM SERPL-SCNC: 4.2 MMOL/L (ref 3.7–5.3)
PROTEIN UA: NEGATIVE
RBC # BLD: 3.7 M/UL (ref 3.95–5.11)
RBC # BLD: 3.76 M/UL (ref 3.95–5.11)
RBC # BLD: ABNORMAL 10*6/UL
SEG NEUTROPHILS: 91 % (ref 36–65)
SEGMENTED NEUTROPHILS ABSOLUTE COUNT: 10.64 K/UL (ref 1.5–8.1)
SODIUM BLD-SCNC: 140 MMOL/L (ref 135–144)
SODIUM BLD-SCNC: 141 MMOL/L (ref 135–144)
SODIUM BLD-SCNC: 143 MMOL/L (ref 135–144)
SPECIFIC GRAVITY UA: 1.05 (ref 1–1.03)
TOTAL PROTEIN: 6.2 G/DL (ref 6.4–8.3)
TOTAL PROTEIN: 6.7 G/DL (ref 6.4–8.3)
TROPONIN INTERP: ABNORMAL
TROPONIN T: ABNORMAL NG/ML
TROPONIN, HIGH SENSITIVITY: 1479 NG/L (ref 0–14)
TROPONIN, HIGH SENSITIVITY: 1663 NG/L (ref 0–14)
TROPONIN, HIGH SENSITIVITY: 1823 NG/L (ref 0–14)
TROPONIN, HIGH SENSITIVITY: 2294 NG/L (ref 0–14)
TURBIDITY: CLEAR
URINE HGB: NEGATIVE
UROBILINOGEN, URINE: NORMAL
WBC # BLD: 11.7 K/UL (ref 3.5–11.3)
WBC # BLD: 9.9 K/UL (ref 3.5–11.3)
WBC # BLD: ABNORMAL 10*3/UL

## 2021-08-27 PROCEDURE — 93005 ELECTROCARDIOGRAM TRACING: CPT | Performed by: INTERNAL MEDICINE

## 2021-08-27 PROCEDURE — 80048 BASIC METABOLIC PNL TOTAL CA: CPT

## 2021-08-27 PROCEDURE — 6370000000 HC RX 637 (ALT 250 FOR IP)

## 2021-08-27 PROCEDURE — 6370000000 HC RX 637 (ALT 250 FOR IP): Performed by: STUDENT IN AN ORGANIZED HEALTH CARE EDUCATION/TRAINING PROGRAM

## 2021-08-27 PROCEDURE — 99233 SBSQ HOSP IP/OBS HIGH 50: CPT | Performed by: INTERNAL MEDICINE

## 2021-08-27 PROCEDURE — 71045 X-RAY EXAM CHEST 1 VIEW: CPT

## 2021-08-27 PROCEDURE — 2580000003 HC RX 258: Performed by: INTERNAL MEDICINE

## 2021-08-27 PROCEDURE — 81003 URINALYSIS AUTO W/O SCOPE: CPT

## 2021-08-27 PROCEDURE — 87040 BLOOD CULTURE FOR BACTERIA: CPT

## 2021-08-27 PROCEDURE — 2000000000 HC ICU R&B

## 2021-08-27 PROCEDURE — 93010 ELECTROCARDIOGRAM REPORT: CPT | Performed by: INTERNAL MEDICINE

## 2021-08-27 PROCEDURE — 93978 VASCULAR STUDY: CPT

## 2021-08-27 PROCEDURE — 6360000002 HC RX W HCPCS: Performed by: STUDENT IN AN ORGANIZED HEALTH CARE EDUCATION/TRAINING PROGRAM

## 2021-08-27 PROCEDURE — 85730 THROMBOPLASTIN TIME PARTIAL: CPT

## 2021-08-27 PROCEDURE — 36415 COLL VENOUS BLD VENIPUNCTURE: CPT

## 2021-08-27 PROCEDURE — 85025 COMPLETE CBC W/AUTO DIFF WBC: CPT

## 2021-08-27 PROCEDURE — 80053 COMPREHEN METABOLIC PANEL: CPT

## 2021-08-27 PROCEDURE — 80076 HEPATIC FUNCTION PANEL: CPT

## 2021-08-27 PROCEDURE — 74018 RADEX ABDOMEN 1 VIEW: CPT

## 2021-08-27 PROCEDURE — 84484 ASSAY OF TROPONIN QUANT: CPT

## 2021-08-27 PROCEDURE — 85027 COMPLETE CBC AUTOMATED: CPT

## 2021-08-27 PROCEDURE — 83735 ASSAY OF MAGNESIUM: CPT

## 2021-08-27 PROCEDURE — 83690 ASSAY OF LIPASE: CPT

## 2021-08-27 PROCEDURE — 93308 TTE F-UP OR LMTD: CPT

## 2021-08-27 PROCEDURE — 2580000003 HC RX 258: Performed by: STUDENT IN AN ORGANIZED HEALTH CARE EDUCATION/TRAINING PROGRAM

## 2021-08-27 PROCEDURE — 2500000003 HC RX 250 WO HCPCS: Performed by: STUDENT IN AN ORGANIZED HEALTH CARE EDUCATION/TRAINING PROGRAM

## 2021-08-27 RX ORDER — POTASSIUM CHLORIDE 7.45 MG/ML
10 INJECTION INTRAVENOUS PRN
Status: DISCONTINUED | OUTPATIENT
Start: 2021-08-27 | End: 2021-08-28

## 2021-08-27 RX ORDER — POTASSIUM CHLORIDE 20 MEQ/1
40 TABLET, EXTENDED RELEASE ORAL PRN
Status: DISCONTINUED | OUTPATIENT
Start: 2021-08-27 | End: 2021-08-28

## 2021-08-27 RX ORDER — 0.9 % SODIUM CHLORIDE 0.9 %
500 INTRAVENOUS SOLUTION INTRAVENOUS ONCE
Status: DISCONTINUED | OUTPATIENT
Start: 2021-08-27 | End: 2021-08-27

## 2021-08-27 RX ORDER — PROCHLORPERAZINE EDISYLATE 5 MG/ML
5 INJECTION INTRAMUSCULAR; INTRAVENOUS EVERY 6 HOURS PRN
Status: DISCONTINUED | OUTPATIENT
Start: 2021-08-27 | End: 2021-08-28

## 2021-08-27 RX ORDER — MAGNESIUM SULFATE IN WATER 40 MG/ML
2000 INJECTION, SOLUTION INTRAVENOUS ONCE
Status: COMPLETED | OUTPATIENT
Start: 2021-08-27 | End: 2021-08-27

## 2021-08-27 RX ORDER — PROCHLORPERAZINE EDISYLATE 5 MG/ML
10 INJECTION INTRAMUSCULAR; INTRAVENOUS ONCE
Status: COMPLETED | OUTPATIENT
Start: 2021-08-27 | End: 2021-08-27

## 2021-08-27 RX ORDER — MAGNESIUM SULFATE IN WATER 40 MG/ML
2000 INJECTION, SOLUTION INTRAVENOUS ONCE
Status: DISCONTINUED | OUTPATIENT
Start: 2021-08-27 | End: 2021-08-27

## 2021-08-27 RX ADMIN — ATORVASTATIN CALCIUM 80 MG: 80 TABLET, FILM COATED ORAL at 21:20

## 2021-08-27 RX ADMIN — ACETAMINOPHEN 650 MG: 650 SUPPOSITORY RECTAL at 02:55

## 2021-08-27 RX ADMIN — TICAGRELOR 90 MG: 90 TABLET ORAL at 10:08

## 2021-08-27 RX ADMIN — MAGNESIUM SULFATE HEPTAHYDRATE 2000 MG: 40 INJECTION, SOLUTION INTRAVENOUS at 13:17

## 2021-08-27 RX ADMIN — NITROGLYCERIN 10 MCG/MIN: 20 INJECTION INTRAVENOUS at 02:23

## 2021-08-27 RX ADMIN — PROCHLORPERAZINE EDISYLATE 10 MG: 5 INJECTION INTRAMUSCULAR; INTRAVENOUS at 09:04

## 2021-08-27 RX ADMIN — NITROGLYCERIN 5 MCG/MIN: 20 INJECTION INTRAVENOUS at 00:20

## 2021-08-27 RX ADMIN — ACETAMINOPHEN 650 MG: 325 TABLET ORAL at 21:20

## 2021-08-27 RX ADMIN — SODIUM CHLORIDE 500 ML: 9 INJECTION, SOLUTION INTRAVENOUS at 12:02

## 2021-08-27 RX ADMIN — ASPIRIN 81 MG: 81 TABLET, CHEWABLE ORAL at 10:08

## 2021-08-27 RX ADMIN — HEPARIN SODIUM AND DEXTROSE 12 UNITS/KG/HR: 10000; 5 INJECTION INTRAVENOUS at 00:12

## 2021-08-27 RX ADMIN — SODIUM CHLORIDE, PRESERVATIVE FREE 10 ML: 5 INJECTION INTRAVENOUS at 10:09

## 2021-08-27 RX ADMIN — ONDANSETRON 4 MG: 2 INJECTION INTRAMUSCULAR; INTRAVENOUS at 14:25

## 2021-08-27 RX ADMIN — NITROGLYCERIN 15 MCG/MIN: 20 INJECTION INTRAVENOUS at 02:55

## 2021-08-27 RX ADMIN — TICAGRELOR 90 MG: 90 TABLET ORAL at 21:20

## 2021-08-27 RX ADMIN — POTASSIUM CHLORIDE 40 MEQ: 1500 TABLET, EXTENDED RELEASE ORAL at 10:16

## 2021-08-27 ASSESSMENT — PAIN DESCRIPTION - DESCRIPTORS
DESCRIPTORS: ACHING
DESCRIPTORS: ACHING;DISCOMFORT

## 2021-08-27 ASSESSMENT — PAIN DESCRIPTION - PROGRESSION
CLINICAL_PROGRESSION: NOT CHANGED
CLINICAL_PROGRESSION: NOT CHANGED
CLINICAL_PROGRESSION: GRADUALLY IMPROVING
CLINICAL_PROGRESSION: NOT CHANGED

## 2021-08-27 ASSESSMENT — PAIN DESCRIPTION - FREQUENCY
FREQUENCY: CONTINUOUS
FREQUENCY: CONTINUOUS

## 2021-08-27 ASSESSMENT — PAIN DESCRIPTION - ORIENTATION
ORIENTATION: MID;POSTERIOR
ORIENTATION: MID;POSTERIOR

## 2021-08-27 ASSESSMENT — PAIN DESCRIPTION - PAIN TYPE
TYPE: ACUTE PAIN
TYPE: ACUTE PAIN
TYPE: OTHER (COMMENT)

## 2021-08-27 ASSESSMENT — PAIN DESCRIPTION - ONSET
ONSET: ON-GOING
ONSET: ON-GOING

## 2021-08-27 ASSESSMENT — PAIN SCALES - GENERAL
PAINLEVEL_OUTOF10: 2
PAINLEVEL_OUTOF10: 4
PAINLEVEL_OUTOF10: 2

## 2021-08-27 ASSESSMENT — PAIN - FUNCTIONAL ASSESSMENT: PAIN_FUNCTIONAL_ASSESSMENT: PREVENTS OR INTERFERES SOME ACTIVE ACTIVITIES AND ADLS

## 2021-08-27 ASSESSMENT — PAIN DESCRIPTION - LOCATION
LOCATION: BACK
LOCATION: BACK

## 2021-08-27 NOTE — PROGRESS NOTES
[facility]   Progress Note                    Date:   8/27/2021  Patient name:  Sunita Sotelo  Date of admission:  8/25/2021 12:12 PM  MRN:   [id number]  YOB: 1945  PCP:    No primary care provider on file. Reason for Admission:  Chest pain [R07.9]  Chest pain, unspecified type [R07.9]  NSTEMI (non-ST elevated myocardial infarction) ([facility] [address] [I21.4]    Subjective:   Clinical Changes / Abnormalities:    Patient seen and examined at bedside. Reported 10/10 chest pain overnight and was administered morphine IV 2 mg. EKG obtained and discussed with Dr. Kieran Cortez. Per discussion with Dr. Cookie Pagan started on nitroglycerin gtt along with heparin gtt with plan to trend HsTrop. Reports resolution of chest pain however continues to have neck pain which improves when its massaged. Continues to have N/V.     Urine output in the last 24 hours:     Intake/Output Summary (Last 24 hours) at 8/27/2021 0935  Last data filed at 8/27/2021 0600  Gross per 24 hour   Intake 262.1 ml   Output 404 ml   Net -141.9 ml     I/O since admission: -141.9cc    Medications:   Scheduled Meds:   sodium chloride flush  5-40 mL IntraVENous 2 times per day    atorvastatin  80 mg Oral Nightly    ticagrelor  90 mg Oral BID    aspirin  81 mg Oral Daily    lidocaine  1 patch Transdermal Daily     Continuous Infusions:   sodium chloride      heparin (PORCINE) Infusion 12 Units/kg/hr (08/27/21 0012)    nitroGLYCERIN 15 mcg/min (08/27/21 0255)    sodium chloride       CBC:   Recent Labs     08/26/21  0330 08/27/21  0154 08/27/21  0533   WBC 5.3 9.9 11.7*   HGB 12.4 11.9 11.4*    160 151     BMP:    Recent Labs     08/26/21  0330 08/26/21  2329 08/27/21  0533    141 143   K 4.4 4.1 3.6*    104 104   CO2 22 14* 14*   BUN 18 17 20   CREATININE 0.99* 0.91* 0.94*   GLUCOSE 86 105* 155*     Hepatic:   Recent Labs     08/25/21  1235 08/27/21  0533   AST 27 170*   ALT 11 28   BILITOT 0.68 0.63   ALKPHOS 63 55     Troponin: No results for input(s): TROPONINI in the last 72 hours. Recent Labs     08/26/21  0330 08/26/21  2329 08/27/21  0533   TROPONINT NOT REPORTED NOT REPORTED NOT REPORTED     BNP:   Recent Labs     08/25/21  1235   PROBNP 1,690*      No results for input(s): BNP in the last 72 hours. Lipids: No results for input(s): CHOL, HDL in the last 72 hours. Invalid input(s): LDLCALCU  INR: No results for input(s): INR in the last 72 hours. Objective:   Vitals: /61   Pulse 85   Temp 97.8 °F (36.6 °C) (Oral)   Resp 18   Ht 5' 1\" (1.549 m)   Wt 117 lb (53.1 kg)   SpO2 99%   BMI 22.11 kg/m²    Last Recorded Weight:  [unfilled]    Constitutional and General Appearance:   alert, cooperative, no distress and appears stated age  Respiratory:  No for increased work of breathing. On auscultation: diminished breath sounds bilaterally  Cardiovascular: The apical impulse is not displaced  Heart tones are crisp and normal. Regular S1 and S2. No murmurs. Abdomen:   No masses or tenderness  Bowel sounds present  Extremities:   No Cyanosis or Clubbing   Lower extremity edema: No   Skin: Warm and dry  Neurological:  Alert and oriented. Diagnostic Studies:       ECHO:   8/26/21  Summary  Left ventricle is normal in size. Global left ventricular systolic function  is moderately reduced. Visually estimated EF 30%. Akinetic apex, mid inferoseptal, apical septal, and apical lateral segments. Severely hypokinetic anteroseptum, apical inferior, and apical anterior. Grade II (moderate) left ventricular diastolic dysfunction. Left atrium is severely dilated. Normal right ventricular size. Right ventricular function appears reduced. Trivial to mild aortic insufficiency. Mitral valve sclerosis with Moderate Mitral Stenosis. Mean gradient is  7mmHg. Mild tricuspid regurgitation. No pulmonary hypertension. Estimated right ventricular systolic pressure is  [id number].     Cardiac Angiography:8/26/21  LMCA: Normal 0% stenosis. LAD: Multiple stenosis. Lesion on Mid LAD: 90% stenosis 50 mm length reduced to 0%. Pre procedure     JULIETH III flow was noted. Post Procedure JULIETH III flow was present. Good     runoff was present. The lesion was diagnosed as High Risk (C). The lesion     was diffuse, eccentric and heavily calcified. The lesion showed with     irregular contour, moderate angulation and moderate tortuosity. Comments:Post NC post dilation grade III perforation noted with     extravasation into pericardial space. 2 Covered stents were used to seal     perforation with good results. Devices used         - Luge Wire 182 cm. Number of passes: 1.         - Mini Trek Balloon 2.5mm x 30mm. 2 inflation(s) to a max pressure of:     14 [place]. - Xience Claudia 3.0 x 38 KVNG. 1 inflation(s) to a max pressure of: 16     [place]. - NC Trek 3.25mm x 15mm. 4 inflation(s) to a max pressure of: 18 [place]. - Papyrus Covered Stent 3.0 x 26. 1 inflation(s) to a max pressure of:     14 [place]. - Papyrus Covered Stent 3.0 x 15. 1 inflation(s) to a max pressure of:     10 [place]. - Xience Claudia 2.75 x 28 KVNG. 1 inflation(s) to a max pressure of: 12     [place]. Lesion on Prox LAD: 90% stenosis 24 mm length reduced to 0%. Pre     procedure JULIETH III flow was noted. Post Procedure JULIETH III flow was     present. Good runoff was present. The lesion was diagnosed as High Risk     (C). Devices used         - Xience Claudia 3.25 x 28 KVNG. 1 inflation(s) to a max pressure of: 16     [place]. - 6 Fr. Guideliner Catheter. Number of passes: 1. LCx: Diffuse irregularities 20-30%. RCA: Diffuse irregularities 20-30%. Small. non-dominant. Graft Lesions         Lesion on Aorta Left to Mid LAD: Proximal body. 90% stenosis. Lesion on Aorta Left to Mid LAD: Middle body. 95% stenosis. Lesion on Aorta Left to Mid LAD: Distal body. 90% stenosis. performed the history and physical personally. I have made changes to the note above as needed. Reviewed repeat Echo- mostly anterior effusion, no echocardiographic tamponade  CP has resolved. Stop hep GTT, wean nitro drip  Abdominal pain, nausea, vomiting- check CXR and KUB  - may need GI or Gen Sug consult  - repeat Limited Echo tomorrow    D/w nursing, patient,     Thank you for allowing me to participate in the care of this patient, please do not hesitate to call if you have any questions. Gormania DO Charo, [facility], [address], [address], [place] [address] [facility]  [facility]. [employer]  [phone]

## 2021-08-27 NOTE — FLOWSHEET NOTE
Assessment: Patient is a 64 y.o. female who arrived to the hospital due to \"chest pain. \" Patient was in hospital bed and shared that she is \"feeling miserable. \" Per report, patient had \"stents placed today. \"    Intervention:  visited patient per initial rounding visits.  introduced herself to patient and learned that she is experiencing \"pain. \" Per patient, her nurse is \"working on medication,\" for her.  provided support and hospitality to patient, as she continued to struggle throughout visit.  informed patient's bedside nurse of support provided to patient during visit. Patient thanked  for care and visit. Outcome: Patient was receptive of 's visit and support. Plan: Chaplains can make follow-up visit, per request. Guerda Armijo can be reached 24/7 via [employer]     08/26/21 6952   Encounter Summary   Services provided to: Patient   Referral/Consult From: Rounding   Support System Spouse; Children   Place of [employer] [facility]   Continue Visiting   (8/26/2021)   Complexity of Encounter Moderate   Length of Encounter 30 minutes   Spiritual Assessment Completed Yes   Routine   Type Initial   Spiritual/Congregational   Type Spiritual support   Assessment Helplessness   Intervention Active listening;Explored feelings, thoughts, concerns;Sustaining presence/ Ministry of presence; Discussed illness/injury and it's impact   Outcome Expressed gratitude;Venting emotion

## 2021-08-27 NOTE — PROGRESS NOTES
[facility]  Internal Medicine Teaching Residency Program  Inpatient Daily Progress Note  ______________________________________________________________________________    Patient: Henry Son  YOB: 1945   SIC:[id number]    Acct: [de-identified]     Room: [address] [place] [address]  Admit date: 2021  Today's date: 21  Number of days in the hospital: 1    SUBJECTIVE   Admitting Diagnosis: NSTEMI (non-ST elevated myocardial infarction) ([facility] [address]  CC:   Pt examined at bedside. Chart & results reviewed. Cardiac cath done yesterday  Patient having intrascapular pain  Feeling nauseated and vomiting noted  Troponin jumped to 2294, trending down to 1663  X-ray abdomen shows no air under diaphragm  Ultrasound shows no aortic dissection          ROS:  Constitutional:  negative for chills, fevers, sweats  Respiratory:  negative for cough, dyspnea on exertion, hemoptysis, shortness of breath, wheezing  Cardiovascular: Positive for chest pain, chest pressure/discomfort, denies lower extremity edema, palpitations  Gastrointestinal:  negative for abdominal pain, constipation, diarrhea, nausea, vomiting  Neurological:  negative for dizziness, headache  BRIEF HISTORY         OBJECTIVE     Vital Signs:  /60   Pulse 86   Temp 97.8 °F (36.6 °C) (Oral)   Resp 21   Ht 5' 1\" (1.549 m)   Wt 117 lb (53.1 kg)   SpO2 95%   BMI 22.11 kg/m²     Temp (24hrs), Av.9 °F (36.6 °C), Min:97.6 °F (36.4 °C), Max:98.2 °F (36.8 °C)    In: 262.1   Out: 404 [Urine:400]    Physical Exam:  Constitutional:  64y.o. year old female who is alert, oriented, cooperative and in no apparent distress. Head:normocephalic and atraumatic. EENT:  PERRLA. No conjunctival injections. Septum was midline, mucosa was without erythema, exudates or cobblestoning. No thrush was noted. Neck: Supple without thyromegaly. No elevated JVP. Trachea was midline.   Respiratory: Chest was symmetrical without dullness to percussion. Breath sounds bilaterally were clear to auscultation. There were no wheezes, rhonchi or rales. There is no intercostal retraction or use of accessory muscles. No egophony noted. Cardiovascular: Regular without murmur, clicks, gallops or rubs. Abdomen: Slightly rounded and soft without organomegaly. No rebound, rigidity or guarding was appreciated. Lymphatic: No lymphadenopathy. Musculoskeletal: Normal curvature of the spine. No gross muscle weakness. Extremities:  No lower extremity edema, ulcerations, tenderness, varicosities or erythema. Muscle size, tone and strength are normal.  No involuntary movements are noted. Skin:  Warm and dry. Good color, turgor and pigmentation. No lesions or scars.   No cyanosis or clubbing  Neurological/Psychiatric: The patient's general behavior, level of consciousness, thought content and emotional status is normal.        Medications:  Scheduled Medications:    sodium chloride flush  5-40 mL IntraVENous 2 times per day    atorvastatin  80 mg Oral Nightly    ticagrelor  90 mg Oral BID    aspirin  81 mg Oral Daily    lidocaine  1 patch Transdermal Daily     Continuous Infusions:    sodium chloride      heparin (PORCINE) Infusion 12 Units/kg/hr (08/27/21 0012)    nitroGLYCERIN 15 mcg/min (08/27/21 0255)    sodium chloride       PRN Medicationssodium chloride flush, 5-40 mL, PRN  sodium chloride, 25 mL, PRN  ondansetron, 4 mg, Q6H PRN  hydrALAZINE, 10 mg, Q10 Min PRN  labetalol, 10 mg, Q30 Min PRN  sodium chloride, 25 mL, PRN  acetaminophen, 650 mg, Q6H PRN   Or  acetaminophen, 650 mg, Q6H PRN        Diagnostic Labs:  CBC:   Recent Labs     08/26/21  0330 08/27/21  0154 08/27/21  0533   WBC 5.3 9.9 11.7*   RBC 3.99 3.76* 3.70*   HGB 12.4 11.9 11.4*   HCT 38.8 36.7 36.2*   MCV 97.2 97.6 97.8   RDW 12.5 12.8 12.7    160 151     BMP:   Recent Labs     08/26/21  0330 08/26/21  2329 08/27/21  0533    141 143   K 4.4 4.1 3.6*    104 104   CO2 22 14* 14*   BUN 18 17 20   CREATININE 0.99* 0.91* 0.94*     BNP: No results for input(s): BNP in the last 72 hours. PT/INR: No results for input(s): PROTIME, INR in the last 72 hours. APTT:   Recent Labs     08/26/21  0712 08/27/21  0009 08/27/21  0533   APTT 44.1* 23.1 50.8*     CARDIAC ENZYMES: No results for input(s): CKMB, CKMBINDEX, TROPONINI in the last 72 hours. Invalid input(s): CKTOTAL;3  FASTING LIPID PANEL:  Lab Results   Component Value Date    CHOL 173 10/28/2015    HDL 70 05/07/2021    TRIG 76 10/28/2015     LIVER PROFILE:   Recent Labs     08/25/21  1235 08/27/21  0533   AST 27 170*   ALT 11 28   BILITOT 0.68 0.63   ALKPHOS 63 55      MICROBIOLOGY: No results found for: CULTURE    Imaging:    XR CHEST PORTABLE    Result Date: 8/25/2021  No acute pulmonary disease. Calcific atherosclerotic disease aorta. ASSESSMENT & PLAN        IMPRESSION  This is a 64 y. o. female who presented with typical chest pain that is relieved with nitro and found to have T wave changes on ECG and elevated Trop level.  Patient admitted to inpatient as NSTEMI, cardiology consulted and they are planning for cardiac cath     NSTEMI:    · Patient is complaining of active chest pain, radiating to intrascapular region  · Undergone cardiac cath with stenting yesterday, PCI to mid LAD  · PTCA and KVNG to proximal and mild LAD and resulted in grade 3 perforation, hemodynamic compromised responded to 2 covered stents to see progression followed by hemodynamic stability echo shows moderate pericardial effusion with no signs of cardiac tamponade pericardial centesis done removal of 40 cc of fluid  · Troponin jumped to 2294, trending down today to 1663  · Patient complaining of nausea and vomiting, which is improved  · X-ray abdomen done to exclude abdominal perforation shows no air under the diaphragm  · Continue aspirin and Brilinta, Lipitor  · Stat echo no echocardiographic tamponade, EF 30% akinetic apex, mid inferior septal, apical septal and apical lateral segments left atrium severely dilated, severely hypokinetic anteroseptum apical inferior and apical anterior, repeat echo tomorrow  · Discontinue heparin drip  · Wean nitroglycerin gtt. start beta-blocker as heart rate and BP tolerates  · Start ACE inhibitors/ARB as BP tolerates  · Will need LifeVest prior to discharge        Coronary artery disease S/P post CABG:  · Cardiac cath done yesterday  · Cardiology following        DVT ppx:   GI ppx: Not needed     PT/OT/SW: Following  Discharge Planning: On board    Caitlin Todd MD  Internal Medicine Resident, PGY-1  [address], [place]  8/27/2021, 6:57 AM

## 2021-08-27 NOTE — PLAN OF CARE
Problem: SAFETY  Goal: Free from accidental physical injury  Outcome: Ongoing     Problem: PAIN  Goal: Patient's pain/discomfort is manageable  Outcome: Ongoing     Problem: Pain:  Goal: Pain level will decrease  Description: Pain level will decrease  8/27/2021 1532 by Aster Jackson RN  Outcome: Ongoing  8/27/2021 0540 by Fabricio Eddy RN  Outcome: Ongoing  Goal: Control of acute pain  Description: Control of acute pain  8/27/2021 1532 by Aster Jackson RN  Outcome: Ongoing  8/27/2021 0540 by Fabricio Eddy RN  Outcome: Ongoing  Goal: Control of chronic pain  Description: Control of chronic pain  8/27/2021 1532 by Aster Jackson RN  Outcome: Ongoing  8/27/2021 0540 by Fabricio Eddy RN  Outcome: Ongoing     Problem: Skin Integrity:  Goal: Will show no infection signs and symptoms  Description: Will show no infection signs and symptoms  Outcome: Ongoing  Goal: Absence of new skin breakdown  Description: Absence of new skin breakdown  Outcome: Ongoing     Problem: Falls - Risk of:  Goal: Will remain free from falls  Description: Will remain free from falls  Outcome: Ongoing  Goal: Absence of physical injury  Description: Absence of physical injury  Outcome: Ongoing

## 2021-08-27 NOTE — PROGRESS NOTES
Was paged by RN regarding patient having 10 out of 10 chest pain. Earlier today patient went for a coronary angiography and had a PCI to the mid LAD, review cath report for details. Reports that in addition to her chest pain she is also nauseous and per RN had one episode of emesis. Administered 2 mg of morphine IV. EKG obtained. Discussed with Dr. Nimco López and Dr. Juno Quiroz. Per discussion with Dr. Juno Quiroz will start patient on nit roglycerin drip and heparin drip. Trend troponins. And continue to monitor for any chest pain.     Ephraim Bustos MD  Cardiology fellow

## 2021-08-27 NOTE — PROGRESS NOTES
Pt having chest pain and getting increasingly uncomfortable. Dr. Mitchell Person notified of situation and orders received for an EKG. EKG sent to him to review. Orders received morphine 2 mg IVP.  Will continue to monitor

## 2021-08-27 NOTE — PROGRESS NOTES
[facility]   Progress Note                    Date:   8/27/2021  Patient name:  Ricardo Alfonso  Date of admission:  8/25/2021 12:12 PM  MRN:   [id number]  YOB: 1945  PCP:    No primary care provider on file. Reason for Admission:  Chest pain [R07.9]  Chest pain, unspecified type [R07.9]  NSTEMI (non-ST elevated myocardial infarction) ([facility] [address] [I21.4]    Subjective:         Denies CP or SOB  Tachycardic          Medications:   Scheduled Meds:   magnesium sulfate  2,000 mg IntraVENous Once    sodium chloride flush  5-40 mL IntraVENous 2 times per day    atorvastatin  80 mg Oral Nightly    ticagrelor  90 mg Oral BID    aspirin  81 mg Oral Daily    lidocaine  1 patch Transdermal Daily     Continuous Infusions:   nitroGLYCERIN 15 mcg/min (08/27/21 0255)     CBC:   Recent Labs     08/26/21  0330 08/27/21  0154 08/27/21  0533   WBC 5.3 9.9 11.7*   HGB 12.4 11.9 11.4*    160 151     BMP:    Recent Labs     08/26/21  0330 08/26/21  2329 08/27/21  0533    141 143   K 4.4 4.1 3.6*    104 104   CO2 22 14* 14*   BUN 18 17 20   CREATININE 0.99* 0.91* 0.94*   GLUCOSE 86 105* 155*     Hepatic:   Recent Labs     08/25/21  1235 08/27/21  0533   AST 27 170*   ALT 11 28   BILITOT 0.68 0.63   ALKPHOS 63 55     Recent Labs     08/26/21  2329 08/27/21  0533 08/27/21  1151   TROPONINT NOT REPORTED NOT REPORTED NOT REPORTED     BNP:   Recent Labs     08/25/21  1235   PROBNP 1,690*       Objective:   Vitals: /61   Pulse 85   Temp 97.8 °F (36.6 °C) (Oral)   Resp 18   Ht 5' 1\" (1.549 m)   Wt 117 lb (53.1 kg)   SpO2 99%   BMI 22.11 kg/m²    Last Recorded Weight:  [unfilled]    Constitutional and General Appearance:    alert, cooperative, no distress and appears stated age  Respiratory:  · No for increased work of breathing.    · On auscultation: diminished breath sounds bilaterally  Cardiovascular:  · The apical impulse is not displaced  · Heart tones are crisp and normal. Regular S1 and S2. No murmurs. Abdomen:   · No masses or tenderness  · Bowel sounds present  Extremities:  ·  No Cyanosis or Clubbing  ·  Lower extremity edema: No  ·  Skin: Warm and dry  Neurological:  · Alert and oriented. Diagnostic Studies:       ECHO:   8/26/21  Summary  Left ventricle is normal in size. Global left ventricular systolic function  is moderately reduced. Visually estimated EF 30%. Akinetic apex, mid inferoseptal, apical septal, and apical lateral segments. Severely hypokinetic anteroseptum, apical inferior, and apical anterior. Grade II (moderate) left ventricular diastolic dysfunction. Left atrium is severely dilated. Normal right ventricular size. Right ventricular function appears reduced. Trivial to mild aortic insufficiency. Mitral valve sclerosis with Moderate Mitral Stenosis. Mean gradient is  7mmHg. Mild tricuspid regurgitation. No pulmonary hypertension. Estimated right ventricular systolic pressure is  66BWWF. Cardiac Angiography:8/26/21  LMCA: Normal 0% stenosis. LAD: Multiple stenosis. Lesion on Mid LAD: 90% stenosis 50 mm length reduced to 0%. Pre procedure     JULIETH III flow was noted. Post Procedure JULIETH III flow was present. Good     runoff was present. The lesion was diagnosed as High Risk (C). The lesion     was diffuse, eccentric and heavily calcified. The lesion showed with     irregular contour, moderate angulation and moderate tortuosity. Comments:Post NC post dilation grade III perforation noted with     extravasation into pericardial space. 2 Covered stents were used to seal     perforation with good results. Devices used         - Luge Wire 182 cm. Number of passes: 1.         - Mini Trek Balloon 2.5mm x 30mm. 2 inflation(s) to a max pressure of:     14 austin. - Xience Claudai 3.0 x 38 KVNG. 1 inflation(s) to a max pressure of: 16     [place]. - NC Trek 3.25mm x 15mm. 4 inflation(s) to a max pressure of: 18 austin. - Papyrus Covered Stent 3.0 x 26. 1 inflation(s) to a max pressure of:     14 [place]in. - Papyrus Covered Stent 3.0 x 15. 1 inflation(s) to a max pressure of:     10 [place]. - Xience Claudia 2.75 x 28 KVNG. 1 inflation(s) to a max pressure of: 12     [place]. Lesion on Prox LAD: 90% stenosis 24 mm length reduced to 0%. Pre     procedure JULIETH III flow was noted. Post Procedure JULIETH III flow was     present. Good runoff was present. The lesion was diagnosed as High Risk     (C). Devices used         - Xience Claudia 3.25 x 28 KVNG. 1 inflation(s) to a max pressure of: 16     [place]. - 6 Fr. Guideliner Catheter. Number of passes: 1. LCx: Diffuse irregularities 20-30%. RCA: Diffuse irregularities 20-30%. Small. non-dominant. Graft Lesions         Lesion on Aorta Left to Mid LAD: Proximal body. 90% stenosis. Lesion on Aorta Left to Mid LAD: Middle body. 95% stenosis. Lesion on Aorta Left to Mid LAD: Distal body. 90% stenosis. Cardiac Grafts        -  There is a Vein graft that originates at the Aorta Left and attaches to       the Mid LAD. Multiple stenosis         The LV gram was performed in the PAYNE 30 position. LVEF: 35 %        Conclusions:          Severe diffuse heavily calcified proximal and mid Long LAD stenosis. SVG-LAD is degenerated with multiple 90-95% stenosis and non-functional.    Minimal LCX and RCA disease. Moderately impaired ventricular function. PTCA and KVNG to proximal and mid LAD resulted in grade III perforation,    hemodynamic compromise responded to 2 covered stents to seal perforation    followed by hemodynamic stability    Echo showed moderate pericardial effusion with no signs of tamponade. Pericadiocentesis was performed with removal of about 40 cc of bloody    fluids. Final angiogram showed JULIETH III flow in LAD with 0% residual stenosis. Recommendations:          Routine Post PCI Orders.     Medical therapy as needed. Risk factor modification. Repeat limited echo in few hours. CCU admission     Assessment / Acute Cardiac Problems:   1. NSTEMI s/p PCI to mid LAD. PTCA and KVNG to proximal and mid LAD resulted in grade III perforation, hemodynamic compromise responded to 2 covered stents to seal perforation followed by hemodynamic stability    2. CAD s/p CABG  3. Moderate anterior pericardial effusion on 8/25/2021 echocardiogram. No evidence of tamponade   4. HTN  5. HLD  6. ICM EF 35%    Plan of Treatment:     1. Continue ASA & Brilinta  2. Continue Lipitor  3. STAT echocardiogram. If no pericardial effusion then add BB  4. Monitor WBC and fever     Vishal Schwartz MD, MD  Fellow, [address] [facility]        Attending note,   The patient was seen and examined. SOB. Tachycardic. Echo reviewed with large pericardial effusion around the left ventricle. Not significant effusion around the RV or possible clotted blood there. Will be difficult to drain the pericardial effusion percutaneously. Detailed discussion with the CT surgery, Dr. Sj Baker reviewed echo images, plan for pericardial window. She has alos elevated troponin, trending down, might need repeat cath to look for the LAD stents, however the patient is not a candidate for anticoagulation and will need pericardial drain first, will follow on troponin and consider possible coronary angiography when more stable. I called the  and discussed with him the condition and he agrees to proceed with pericardial window.

## 2021-08-28 ENCOUNTER — ANESTHESIA (OUTPATIENT)
Dept: OPERATING ROOM | Age: 76
DRG: 270 | End: 2021-08-28
Payer: MEDICARE

## 2021-08-28 ENCOUNTER — ANESTHESIA EVENT (OUTPATIENT)
Dept: OPERATING ROOM | Age: 76
DRG: 270 | End: 2021-08-28
Payer: MEDICARE

## 2021-08-28 ENCOUNTER — APPOINTMENT (OUTPATIENT)
Dept: GENERAL RADIOLOGY | Age: 76
DRG: 270 | End: 2021-08-28
Payer: MEDICARE

## 2021-08-28 VITALS — OXYGEN SATURATION: 70 % | DIASTOLIC BLOOD PRESSURE: 94 MMHG | TEMPERATURE: 97.2 F | SYSTOLIC BLOOD PRESSURE: 125 MMHG

## 2021-08-28 LAB
ABSOLUTE EOS #: 0 K/UL (ref 0–0.44)
ABSOLUTE IMMATURE GRANULOCYTE: 0 K/UL (ref 0–0.3)
ABSOLUTE LYMPH #: 0.54 K/UL (ref 1.1–3.7)
ABSOLUTE MONO #: 1.63 K/UL (ref 0.1–1.2)
ALLEN TEST: ABNORMAL
ANION GAP SERPL CALCULATED.3IONS-SCNC: 12 MMOL/L (ref 9–17)
ANION GAP SERPL CALCULATED.3IONS-SCNC: 16 MMOL/L (ref 9–17)
ANION GAP SERPL CALCULATED.3IONS-SCNC: 16 MMOL/L (ref 9–17)
ANION GAP: 11 MMOL/L (ref 7–16)
BASOPHILS # BLD: 0 % (ref 0–2)
BASOPHILS ABSOLUTE: 0 K/UL (ref 0–0.2)
BLOOD BANK SPECIMEN: NORMAL
BUN BLDV-MCNC: 19 MG/DL (ref 8–23)
BUN BLDV-MCNC: 22 MG/DL (ref 8–23)
BUN BLDV-MCNC: 24 MG/DL (ref 8–23)
BUN/CREAT BLD: ABNORMAL (ref 9–20)
CALCIUM SERPL-MCNC: 8 MG/DL (ref 8.6–10.4)
CALCIUM SERPL-MCNC: 8.5 MG/DL (ref 8.6–10.4)
CALCIUM SERPL-MCNC: 8.9 MG/DL (ref 8.6–10.4)
CHLORIDE BLD-SCNC: 101 MMOL/L (ref 98–107)
CHLORIDE BLD-SCNC: 103 MMOL/L (ref 98–107)
CHLORIDE BLD-SCNC: 106 MMOL/L (ref 98–107)
CO2: 17 MMOL/L (ref 20–31)
CO2: 17 MMOL/L (ref 20–31)
CO2: 21 MMOL/L (ref 20–31)
CREAT SERPL-MCNC: 0.64 MG/DL (ref 0.5–0.9)
CREAT SERPL-MCNC: 0.75 MG/DL (ref 0.5–0.9)
CREAT SERPL-MCNC: 0.81 MG/DL (ref 0.5–0.9)
DIFFERENTIAL TYPE: ABNORMAL
EKG ATRIAL RATE: 140 BPM
EKG ATRIAL RATE: 99 BPM
EKG P AXIS: 65 DEGREES
EKG P AXIS: 67 DEGREES
EKG P-R INTERVAL: 132 MS
EKG P-R INTERVAL: 156 MS
EKG Q-T INTERVAL: 258 MS
EKG Q-T INTERVAL: 384 MS
EKG QRS DURATION: 62 MS
EKG QRS DURATION: 72 MS
EKG QTC CALCULATION (BAZETT): 393 MS
EKG QTC CALCULATION (BAZETT): 492 MS
EKG R AXIS: 68 DEGREES
EKG R AXIS: 76 DEGREES
EKG T AXIS: -54 DEGREES
EKG T AXIS: 89 DEGREES
EKG VENTRICULAR RATE: 140 BPM
EKG VENTRICULAR RATE: 99 BPM
EOSINOPHILS RELATIVE PERCENT: 0 % (ref 1–4)
FIO2: 100
FIO2: 45
FIO2: ABNORMAL
FIO2: ABNORMAL
GFR AFRICAN AMERICAN: >60 ML/MIN
GFR NON-AFRICAN AMERICAN: >60 ML/MIN
GFR SERPL CREATININE-BSD FRML MDRD: >60 ML/MIN
GFR SERPL CREATININE-BSD FRML MDRD: ABNORMAL ML/MIN/{1.73_M2}
GFR SERPL CREATININE-BSD FRML MDRD: NORMAL ML/MIN/{1.73_M2}
GLUCOSE BLD-MCNC: 126 MG/DL (ref 74–100)
GLUCOSE BLD-MCNC: 129 MG/DL (ref 65–105)
GLUCOSE BLD-MCNC: 132 MG/DL (ref 74–100)
GLUCOSE BLD-MCNC: 136 MG/DL (ref 70–99)
GLUCOSE BLD-MCNC: 140 MG/DL (ref 74–100)
GLUCOSE BLD-MCNC: 142 MG/DL (ref 65–105)
GLUCOSE BLD-MCNC: 144 MG/DL (ref 70–99)
GLUCOSE BLD-MCNC: 146 MG/DL (ref 70–99)
GLUCOSE BLD-MCNC: 151 MG/DL (ref 65–105)
GLUCOSE BLD-MCNC: 169 MG/DL (ref 74–100)
HCT VFR BLD CALC: 29.2 % (ref 36.3–47.1)
HCT VFR BLD CALC: 36.5 % (ref 36.3–47.1)
HCT VFR BLD CALC: 37.1 % (ref 36.3–47.1)
HEMOGLOBIN: 11.7 G/DL (ref 11.9–15.1)
HEMOGLOBIN: 12.1 G/DL (ref 11.9–15.1)
HEMOGLOBIN: 9.5 G/DL (ref 11.9–15.1)
IMMATURE GRANULOCYTES: 0 %
INR BLD: 1.1
LACTIC ACID, WHOLE BLOOD: 1.7 MMOL/L (ref 0.7–2.1)
LACTIC ACID: NORMAL MMOL/L
LYMPHOCYTES # BLD: 3 % (ref 24–43)
MAGNESIUM: 1.8 MG/DL (ref 1.6–2.6)
MAGNESIUM: 1.9 MG/DL (ref 1.6–2.6)
MAGNESIUM: 2.2 MG/DL (ref 1.6–2.6)
MAGNESIUM: 2.7 MG/DL (ref 1.6–2.6)
MCH RBC QN AUTO: 31.3 PG (ref 25.2–33.5)
MCH RBC QN AUTO: 31.5 PG (ref 25.2–33.5)
MCH RBC QN AUTO: 31.6 PG (ref 25.2–33.5)
MCHC RBC AUTO-ENTMCNC: 32.1 G/DL (ref 28.4–34.8)
MCHC RBC AUTO-ENTMCNC: 32.5 G/DL (ref 28.4–34.8)
MCHC RBC AUTO-ENTMCNC: 32.6 G/DL (ref 28.4–34.8)
MCV RBC AUTO: 96.6 FL (ref 82.6–102.9)
MCV RBC AUTO: 97 FL (ref 82.6–102.9)
MCV RBC AUTO: 97.6 FL (ref 82.6–102.9)
MODE: ABNORMAL
MONOCYTES # BLD: 9 % (ref 3–12)
MORPHOLOGY: NORMAL
NEGATIVE BASE EXCESS, ART: 2 (ref 0–2)
NEGATIVE BASE EXCESS, ART: 2 (ref 0–2)
NEGATIVE BASE EXCESS, ART: 5 (ref 0–2)
NEGATIVE BASE EXCESS, ART: 6 (ref 0–2)
NRBC AUTOMATED: 0 PER 100 WBC
O2 DEVICE/FLOW/%: ABNORMAL
PARTIAL THROMBOPLASTIN TIME: 20.4 SEC (ref 20.5–30.5)
PARTIAL THROMBOPLASTIN TIME: 21.7 SEC (ref 20.5–30.5)
PATIENT TEMP: ABNORMAL
PDW BLD-RTO: 13 % (ref 11.8–14.4)
PDW BLD-RTO: 13.1 % (ref 11.8–14.4)
PDW BLD-RTO: 13.1 % (ref 11.8–14.4)
PLATELET # BLD: 162 K/UL (ref 138–453)
PLATELET # BLD: 210 K/UL (ref 138–453)
PLATELET # BLD: 259 K/UL (ref 138–453)
PLATELET ESTIMATE: ABNORMAL
PMV BLD AUTO: 11.4 FL (ref 8.1–13.5)
PMV BLD AUTO: 11.5 FL (ref 8.1–13.5)
PMV BLD AUTO: 11.7 FL (ref 8.1–13.5)
POC BUN: 21 MG/DL (ref 8–26)
POC CHLORIDE: 108 MMOL/L (ref 98–107)
POC CREATININE: 0.78 MG/DL (ref 0.51–1.19)
POC HCO3: 19 MMOL/L (ref 21–28)
POC HCO3: 19.9 MMOL/L (ref 21–28)
POC HCO3: 22.6 MMOL/L (ref 21–28)
POC HCO3: 23.5 MMOL/L (ref 21–28)
POC HEMATOCRIT: 30 % (ref 36–46)
POC HEMATOCRIT: 31 % (ref 36–46)
POC HEMATOCRIT: 32 % (ref 36–46)
POC HEMOGLOBIN: 10.2 G/DL (ref 12–16)
POC HEMOGLOBIN: 10.6 G/DL (ref 12–16)
POC HEMOGLOBIN: 10.7 G/DL (ref 12–16)
POC IONIZED CALCIUM: 0.93 MMOL/L (ref 1.15–1.33)
POC IONIZED CALCIUM: 1.07 MMOL/L (ref 1.15–1.33)
POC IONIZED CALCIUM: 1.16 MMOL/L (ref 1.15–1.33)
POC LACTIC ACID: 1.25 MMOL/L (ref 0.56–1.39)
POC LACTIC ACID: 1.48 MMOL/L (ref 0.56–1.39)
POC LACTIC ACID: 1.63 MMOL/L (ref 0.56–1.39)
POC O2 SATURATION: 100 % (ref 94–98)
POC O2 SATURATION: 94 % (ref 94–98)
POC PCO2 TEMP: ABNORMAL MM HG
POC PCO2: 31.9 MM HG (ref 35–48)
POC PCO2: 38.9 MM HG (ref 35–48)
POC PCO2: 39.5 MM HG (ref 35–48)
POC PCO2: 40.6 MM HG (ref 35–48)
POC PH TEMP: ABNORMAL
POC PH: 7.3 (ref 7.35–7.45)
POC PH: 7.37 (ref 7.35–7.45)
POC PH: 7.38 (ref 7.35–7.45)
POC PH: 7.38 (ref 7.35–7.45)
POC PO2 TEMP: ABNORMAL MM HG
POC PO2: 174.3 MM HG (ref 83–108)
POC PO2: 282.6 MM HG (ref 83–108)
POC PO2: 345.3 MM HG (ref 83–108)
POC PO2: 71.3 MM HG (ref 83–108)
POC POTASSIUM: 3.3 MMOL/L (ref 3.5–4.5)
POC POTASSIUM: 3.5 MMOL/L (ref 3.5–4.5)
POC POTASSIUM: 4.6 MMOL/L (ref 3.5–4.5)
POC SODIUM: 140 MMOL/L (ref 138–146)
POC SODIUM: 140 MMOL/L (ref 138–146)
POC SODIUM: 142 MMOL/L (ref 138–146)
POC TCO2: 20 MMOL/L (ref 22–30)
POC TCO2: 21 MMOL/L (ref 22–30)
POC TCO2: 24 MMOL/L (ref 22–30)
POSITIVE BASE EXCESS, ART: ABNORMAL (ref 0–3)
POTASSIUM SERPL-SCNC: 3.1 MMOL/L (ref 3.7–5.3)
POTASSIUM SERPL-SCNC: 3.9 MMOL/L (ref 3.7–5.3)
POTASSIUM SERPL-SCNC: 4.3 MMOL/L (ref 3.7–5.3)
POTASSIUM SERPL-SCNC: 4.4 MMOL/L (ref 3.7–5.3)
PROTHROMBIN TIME: 11.2 SEC (ref 9.1–12.3)
RBC # BLD: 3.01 M/UL (ref 3.95–5.11)
RBC # BLD: 3.74 M/UL (ref 3.95–5.11)
RBC # BLD: 3.84 M/UL (ref 3.95–5.11)
RBC # BLD: ABNORMAL 10*6/UL
SAMPLE SITE: ABNORMAL
SEG NEUTROPHILS: 88 % (ref 36–65)
SEGMENTED NEUTROPHILS ABSOLUTE COUNT: 15.93 K/UL (ref 1.5–8.1)
SODIUM BLD-SCNC: 134 MMOL/L (ref 135–144)
SODIUM BLD-SCNC: 136 MMOL/L (ref 135–144)
SODIUM BLD-SCNC: 139 MMOL/L (ref 135–144)
TCO2 (CALC), ART: ABNORMAL MMOL/L (ref 22–29)
TROPONIN INTERP: ABNORMAL
TROPONIN INTERP: ABNORMAL
TROPONIN T: ABNORMAL NG/ML
TROPONIN T: ABNORMAL NG/ML
TROPONIN, HIGH SENSITIVITY: 1518 NG/L (ref 0–14)
TROPONIN, HIGH SENSITIVITY: 1752 NG/L (ref 0–14)
WBC # BLD: 18.1 K/UL (ref 3.5–11.3)
WBC # BLD: 19.7 K/UL (ref 3.5–11.3)
WBC # BLD: 22.4 K/UL (ref 3.5–11.3)
WBC # BLD: ABNORMAL 10*3/UL

## 2021-08-28 PROCEDURE — 7100000001 HC PACU RECOVERY - ADDTL 15 MIN

## 2021-08-28 PROCEDURE — P9073 PLATELETS PHERESIS PATH REDU: HCPCS

## 2021-08-28 PROCEDURE — 84132 ASSAY OF SERUM POTASSIUM: CPT

## 2021-08-28 PROCEDURE — 83735 ASSAY OF MAGNESIUM: CPT

## 2021-08-28 PROCEDURE — 86901 BLOOD TYPING SEROLOGIC RH(D): CPT

## 2021-08-28 PROCEDURE — 85347 COAGULATION TIME ACTIVATED: CPT

## 2021-08-28 PROCEDURE — 2580000003 HC RX 258: Performed by: NURSE PRACTITIONER

## 2021-08-28 PROCEDURE — 83605 ASSAY OF LACTIC ACID: CPT

## 2021-08-28 PROCEDURE — 85730 THROMBOPLASTIN TIME PARTIAL: CPT

## 2021-08-28 PROCEDURE — 2700000000 HC OXYGEN THERAPY PER DAY

## 2021-08-28 PROCEDURE — 99233 SBSQ HOSP IP/OBS HIGH 50: CPT | Performed by: INTERNAL MEDICINE

## 2021-08-28 PROCEDURE — 36556 INSERT NON-TUNNEL CV CATH: CPT | Performed by: ANESTHESIOLOGY

## 2021-08-28 PROCEDURE — 84484 ASSAY OF TROPONIN QUANT: CPT

## 2021-08-28 PROCEDURE — 2709999900 HC NON-CHARGEABLE SUPPLY: Performed by: THORACIC SURGERY (CARDIOTHORACIC VASCULAR SURGERY)

## 2021-08-28 PROCEDURE — 93308 TTE F-UP OR LMTD: CPT

## 2021-08-28 PROCEDURE — 6360000002 HC RX W HCPCS: Performed by: THORACIC SURGERY (CARDIOTHORACIC VASCULAR SURGERY)

## 2021-08-28 PROCEDURE — 93005 ELECTROCARDIOGRAM TRACING: CPT | Performed by: INTERNAL MEDICINE

## 2021-08-28 PROCEDURE — 94761 N-INVAS EAR/PLS OXIMETRY MLT: CPT

## 2021-08-28 PROCEDURE — 2500000003 HC RX 250 WO HCPCS: Performed by: THORACIC SURGERY (CARDIOTHORACIC VASCULAR SURGERY)

## 2021-08-28 PROCEDURE — 93005 ELECTROCARDIOGRAM TRACING: CPT | Performed by: THORACIC SURGERY (CARDIOTHORACIC VASCULAR SURGERY)

## 2021-08-28 PROCEDURE — 7100000000 HC PACU RECOVERY - FIRST 15 MIN

## 2021-08-28 PROCEDURE — 02HV33Z INSERTION OF INFUSION DEVICE INTO SUPERIOR VENA CAVA, PERCUTANEOUS APPROACH: ICD-10-PCS | Performed by: ANESTHESIOLOGY

## 2021-08-28 PROCEDURE — 36415 COLL VENOUS BLD VENIPUNCTURE: CPT

## 2021-08-28 PROCEDURE — 99222 1ST HOSP IP/OBS MODERATE 55: CPT | Performed by: INTERNAL MEDICINE

## 2021-08-28 PROCEDURE — 2500000003 HC RX 250 WO HCPCS: Performed by: NURSE ANESTHETIST, CERTIFIED REGISTERED

## 2021-08-28 PROCEDURE — 3700000001 HC ADD 15 MINUTES (ANESTHESIA): Performed by: THORACIC SURGERY (CARDIOTHORACIC VASCULAR SURGERY)

## 2021-08-28 PROCEDURE — 93010 ELECTROCARDIOGRAM REPORT: CPT | Performed by: INTERNAL MEDICINE

## 2021-08-28 PROCEDURE — 86850 RBC ANTIBODY SCREEN: CPT

## 2021-08-28 PROCEDURE — 2100000001 HC CVICU R&B

## 2021-08-28 PROCEDURE — 36620 INSERTION CATHETER ARTERY: CPT

## 2021-08-28 PROCEDURE — 37799 UNLISTED PX VASCULAR SURGERY: CPT

## 2021-08-28 PROCEDURE — 3600000015 HC SURGERY LEVEL 5 ADDTL 15MIN: Performed by: THORACIC SURGERY (CARDIOTHORACIC VASCULAR SURGERY)

## 2021-08-28 PROCEDURE — 94640 AIRWAY INHALATION TREATMENT: CPT

## 2021-08-28 PROCEDURE — 84520 ASSAY OF UREA NITROGEN: CPT

## 2021-08-28 PROCEDURE — 86920 COMPATIBILITY TEST SPIN: CPT

## 2021-08-28 PROCEDURE — 85576 BLOOD PLATELET AGGREGATION: CPT

## 2021-08-28 PROCEDURE — 6360000002 HC RX W HCPCS: Performed by: NURSE PRACTITIONER

## 2021-08-28 PROCEDURE — 3700000000 HC ANESTHESIA ATTENDED CARE: Performed by: THORACIC SURGERY (CARDIOTHORACIC VASCULAR SURGERY)

## 2021-08-28 PROCEDURE — 85025 COMPLETE CBC W/AUTO DIFF WBC: CPT

## 2021-08-28 PROCEDURE — 85027 COMPLETE CBC AUTOMATED: CPT

## 2021-08-28 PROCEDURE — 0W9D00Z DRAINAGE OF PERICARDIAL CAVITY WITH DRAINAGE DEVICE, OPEN APPROACH: ICD-10-PCS | Performed by: THORACIC SURGERY (CARDIOTHORACIC VASCULAR SURGERY)

## 2021-08-28 PROCEDURE — 71045 X-RAY EXAM CHEST 1 VIEW: CPT

## 2021-08-28 PROCEDURE — 80048 BASIC METABOLIC PNL TOTAL CA: CPT

## 2021-08-28 PROCEDURE — 6360000002 HC RX W HCPCS

## 2021-08-28 PROCEDURE — 84295 ASSAY OF SERUM SODIUM: CPT

## 2021-08-28 PROCEDURE — 82330 ASSAY OF CALCIUM: CPT

## 2021-08-28 PROCEDURE — 82947 ASSAY GLUCOSE BLOOD QUANT: CPT

## 2021-08-28 PROCEDURE — 85014 HEMATOCRIT: CPT

## 2021-08-28 PROCEDURE — 82803 BLOOD GASES ANY COMBINATION: CPT

## 2021-08-28 PROCEDURE — 6360000002 HC RX W HCPCS: Performed by: NURSE ANESTHETIST, CERTIFIED REGISTERED

## 2021-08-28 PROCEDURE — 6370000000 HC RX 637 (ALT 250 FOR IP): Performed by: NURSE PRACTITIONER

## 2021-08-28 PROCEDURE — C9113 INJ PANTOPRAZOLE SODIUM, VIA: HCPCS | Performed by: NURSE PRACTITIONER

## 2021-08-28 PROCEDURE — 85390 FIBRINOLYSINS SCREEN I&R: CPT

## 2021-08-28 PROCEDURE — 85610 PROTHROMBIN TIME: CPT

## 2021-08-28 PROCEDURE — 0PC00ZZ EXTIRPATION OF MATTER FROM STERNUM, OPEN APPROACH: ICD-10-PCS | Performed by: THORACIC SURGERY (CARDIOTHORACIC VASCULAR SURGERY)

## 2021-08-28 PROCEDURE — 3600000005 HC SURGERY LEVEL 5 BASE: Performed by: THORACIC SURGERY (CARDIOTHORACIC VASCULAR SURGERY)

## 2021-08-28 PROCEDURE — 80051 ELECTROLYTE PANEL: CPT

## 2021-08-28 PROCEDURE — P9037 PLATE PHERES LEUKOREDU IRRAD: HCPCS

## 2021-08-28 PROCEDURE — 2580000003 HC RX 258: Performed by: STUDENT IN AN ORGANIZED HEALTH CARE EDUCATION/TRAINING PROGRAM

## 2021-08-28 PROCEDURE — 82565 ASSAY OF CREATININE: CPT

## 2021-08-28 PROCEDURE — 86900 BLOOD TYPING SEROLOGIC ABO: CPT

## 2021-08-28 PROCEDURE — 94002 VENT MGMT INPAT INIT DAY: CPT

## 2021-08-28 PROCEDURE — P9041 ALBUMIN (HUMAN),5%, 50ML: HCPCS | Performed by: NURSE PRACTITIONER

## 2021-08-28 PROCEDURE — 2580000003 HC RX 258: Performed by: NURSE ANESTHETIST, CERTIFIED REGISTERED

## 2021-08-28 PROCEDURE — 85384 FIBRINOGEN ACTIVITY: CPT

## 2021-08-28 PROCEDURE — 82374 ASSAY BLOOD CARBON DIOXIDE: CPT

## 2021-08-28 PROCEDURE — 99221 1ST HOSP IP/OBS SF/LOW 40: CPT | Performed by: NURSE PRACTITIONER

## 2021-08-28 RX ORDER — DEXTROSE MONOHYDRATE 25 G/50ML
12.5 INJECTION, SOLUTION INTRAVENOUS PRN
Status: DISCONTINUED | OUTPATIENT
Start: 2021-08-28 | End: 2021-09-03 | Stop reason: HOSPADM

## 2021-08-28 RX ORDER — ROCURONIUM BROMIDE 10 MG/ML
INJECTION, SOLUTION INTRAVENOUS PRN
Status: DISCONTINUED | OUTPATIENT
Start: 2021-08-28 | End: 2021-08-28 | Stop reason: SDUPTHER

## 2021-08-28 RX ORDER — VANCOMYCIN HYDROCHLORIDE 1 G/200ML
1000 INJECTION, SOLUTION INTRAVENOUS
Status: DISCONTINUED | OUTPATIENT
Start: 2021-08-28 | End: 2021-08-28 | Stop reason: HOSPADM

## 2021-08-28 RX ORDER — CHLORHEXIDINE GLUCONATE 0.12 MG/ML
15 RINSE ORAL ONCE
Status: DISCONTINUED | OUTPATIENT
Start: 2021-08-28 | End: 2021-08-28 | Stop reason: HOSPADM

## 2021-08-28 RX ORDER — MORPHINE SULFATE 2 MG/ML
2 INJECTION, SOLUTION INTRAMUSCULAR; INTRAVENOUS EVERY 4 HOURS PRN
Status: DISCONTINUED | OUTPATIENT
Start: 2021-08-28 | End: 2021-08-28

## 2021-08-28 RX ORDER — FUROSEMIDE 10 MG/ML
20 INJECTION INTRAMUSCULAR; INTRAVENOUS ONCE
Status: COMPLETED | OUTPATIENT
Start: 2021-08-28 | End: 2021-08-28

## 2021-08-28 RX ORDER — SODIUM CHLORIDE 9 MG/ML
INJECTION, SOLUTION INTRAVENOUS CONTINUOUS
Status: DISCONTINUED | OUTPATIENT
Start: 2021-08-29 | End: 2021-08-28

## 2021-08-28 RX ORDER — PROTAMINE SULFATE 10 MG/ML
50 INJECTION, SOLUTION INTRAVENOUS
Status: ACTIVE | OUTPATIENT
Start: 2021-08-28 | End: 2021-08-28

## 2021-08-28 RX ORDER — PANTOPRAZOLE SODIUM 40 MG/1
40 TABLET, DELAYED RELEASE ORAL DAILY
Status: DISCONTINUED | OUTPATIENT
Start: 2021-08-28 | End: 2021-09-03 | Stop reason: HOSPADM

## 2021-08-28 RX ORDER — ASPIRIN 81 MG/1
81 TABLET ORAL
Status: DISCONTINUED | OUTPATIENT
Start: 2021-08-28 | End: 2021-08-28

## 2021-08-28 RX ORDER — FENTANYL CITRATE 50 UG/ML
25 INJECTION, SOLUTION INTRAMUSCULAR; INTRAVENOUS
Status: DISCONTINUED | OUTPATIENT
Start: 2021-08-28 | End: 2021-09-03 | Stop reason: HOSPADM

## 2021-08-28 RX ORDER — ETOMIDATE 2 MG/ML
INJECTION INTRAVENOUS PRN
Status: DISCONTINUED | OUTPATIENT
Start: 2021-08-28 | End: 2021-08-28 | Stop reason: SDUPTHER

## 2021-08-28 RX ORDER — OXYCODONE HYDROCHLORIDE AND ACETAMINOPHEN 5; 325 MG/1; MG/1
1 TABLET ORAL EVERY 4 HOURS PRN
Status: DISCONTINUED | OUTPATIENT
Start: 2021-08-28 | End: 2021-09-03 | Stop reason: HOSPADM

## 2021-08-28 RX ORDER — NICOTINE POLACRILEX 4 MG
15 LOZENGE BUCCAL PRN
Status: DISCONTINUED | OUTPATIENT
Start: 2021-08-28 | End: 2021-09-03 | Stop reason: HOSPADM

## 2021-08-28 RX ORDER — DEXTROSE MONOHYDRATE 50 MG/ML
100 INJECTION, SOLUTION INTRAVENOUS PRN
Status: DISCONTINUED | OUTPATIENT
Start: 2021-08-28 | End: 2021-09-03 | Stop reason: HOSPADM

## 2021-08-28 RX ORDER — POLYETHYLENE GLYCOL 3350 17 G/17G
17 POWDER, FOR SOLUTION ORAL DAILY
Status: DISCONTINUED | OUTPATIENT
Start: 2021-08-28 | End: 2021-09-03 | Stop reason: HOSPADM

## 2021-08-28 RX ORDER — SODIUM CHLORIDE 0.9 % (FLUSH) 0.9 %
10 SYRINGE (ML) INJECTION EVERY 12 HOURS SCHEDULED
Status: DISCONTINUED | OUTPATIENT
Start: 2021-08-28 | End: 2021-09-03 | Stop reason: HOSPADM

## 2021-08-28 RX ORDER — CEFAZOLIN SODIUM 2 G/50ML
SOLUTION INTRAVENOUS PRN
Status: DISCONTINUED | OUTPATIENT
Start: 2021-08-28 | End: 2021-08-28 | Stop reason: SDUPTHER

## 2021-08-28 RX ORDER — SODIUM CHLORIDE 0.9 % (FLUSH) 0.9 %
5-40 SYRINGE (ML) INJECTION EVERY 12 HOURS SCHEDULED
Status: DISCONTINUED | OUTPATIENT
Start: 2021-08-28 | End: 2021-08-28

## 2021-08-28 RX ORDER — MAGNESIUM SULFATE 1 G/100ML
1000 INJECTION INTRAVENOUS PRN
Status: DISCONTINUED | OUTPATIENT
Start: 2021-08-28 | End: 2021-09-03 | Stop reason: HOSPADM

## 2021-08-28 RX ORDER — DEXAMETHASONE SODIUM PHOSPHATE 10 MG/ML
INJECTION INTRAMUSCULAR; INTRAVENOUS PRN
Status: DISCONTINUED | OUTPATIENT
Start: 2021-08-28 | End: 2021-08-28 | Stop reason: SDUPTHER

## 2021-08-28 RX ORDER — SODIUM CHLORIDE 0.9 % (FLUSH) 0.9 %
10 SYRINGE (ML) INJECTION PRN
Status: DISCONTINUED | OUTPATIENT
Start: 2021-08-28 | End: 2021-08-28

## 2021-08-28 RX ORDER — VANCOMYCIN HYDROCHLORIDE 1 G/200ML
1000 INJECTION, SOLUTION INTRAVENOUS EVERY 12 HOURS
Status: COMPLETED | OUTPATIENT
Start: 2021-08-28 | End: 2021-08-29

## 2021-08-28 RX ORDER — ACETAMINOPHEN 325 MG/1
650 TABLET ORAL EVERY 4 HOURS PRN
Status: DISCONTINUED | OUTPATIENT
Start: 2021-08-28 | End: 2021-09-03 | Stop reason: HOSPADM

## 2021-08-28 RX ORDER — SODIUM CHLORIDE 9 MG/ML
INJECTION, SOLUTION INTRAVENOUS CONTINUOUS
Status: DISCONTINUED | OUTPATIENT
Start: 2021-08-28 | End: 2021-08-28

## 2021-08-28 RX ORDER — AMIODARONE HYDROCHLORIDE 200 MG/1
200 TABLET ORAL 3 TIMES DAILY
Status: DISCONTINUED | OUTPATIENT
Start: 2021-08-28 | End: 2021-09-03 | Stop reason: HOSPADM

## 2021-08-28 RX ORDER — NITROGLYCERIN 20 MG/100ML
5-200 INJECTION INTRAVENOUS CONTINUOUS
Status: DISCONTINUED | OUTPATIENT
Start: 2021-08-28 | End: 2021-08-29

## 2021-08-28 RX ORDER — CHLORHEXIDINE GLUCONATE 4 G/100ML
SOLUTION TOPICAL SEE ADMIN INSTRUCTIONS
Status: DISCONTINUED | OUTPATIENT
Start: 2021-08-28 | End: 2021-08-28 | Stop reason: HOSPADM

## 2021-08-28 RX ORDER — FENTANYL CITRATE 50 UG/ML
50 INJECTION, SOLUTION INTRAMUSCULAR; INTRAVENOUS
Status: DISCONTINUED | OUTPATIENT
Start: 2021-08-28 | End: 2021-09-03 | Stop reason: HOSPADM

## 2021-08-28 RX ORDER — NOREPINEPHRINE BIT/0.9 % NACL 16MG/250ML
0.2 INFUSION BOTTLE (ML) INTRAVENOUS CONTINUOUS PRN
Status: DISCONTINUED | OUTPATIENT
Start: 2021-08-28 | End: 2021-08-30

## 2021-08-28 RX ORDER — OXYCODONE HYDROCHLORIDE AND ACETAMINOPHEN 5; 325 MG/1; MG/1
2 TABLET ORAL EVERY 4 HOURS PRN
Status: DISCONTINUED | OUTPATIENT
Start: 2021-08-28 | End: 2021-09-03 | Stop reason: HOSPADM

## 2021-08-28 RX ORDER — PANTOPRAZOLE SODIUM 40 MG/10ML
40 INJECTION, POWDER, LYOPHILIZED, FOR SOLUTION INTRAVENOUS DAILY
Status: DISCONTINUED | OUTPATIENT
Start: 2021-08-28 | End: 2021-08-29

## 2021-08-28 RX ORDER — SODIUM CHLORIDE 0.9 % (FLUSH) 0.9 %
10 SYRINGE (ML) INJECTION PRN
Status: DISCONTINUED | OUTPATIENT
Start: 2021-08-28 | End: 2021-09-03 | Stop reason: HOSPADM

## 2021-08-28 RX ORDER — SODIUM CHLORIDE 9 MG/ML
10 INJECTION INTRAVENOUS DAILY
Status: DISCONTINUED | OUTPATIENT
Start: 2021-08-28 | End: 2021-08-29

## 2021-08-28 RX ORDER — MORPHINE SULFATE 2 MG/ML
INJECTION, SOLUTION INTRAMUSCULAR; INTRAVENOUS
Status: COMPLETED
Start: 2021-08-28 | End: 2021-08-28

## 2021-08-28 RX ORDER — INSULIN GLARGINE 100 [IU]/ML
0.15 INJECTION, SOLUTION SUBCUTANEOUS NIGHTLY
Status: DISCONTINUED | OUTPATIENT
Start: 2021-08-29 | End: 2021-09-02

## 2021-08-28 RX ORDER — METOPROLOL TARTRATE 5 MG/5ML
INJECTION INTRAVENOUS
Status: DISCONTINUED
Start: 2021-08-28 | End: 2021-08-28

## 2021-08-28 RX ORDER — METOPROLOL TARTRATE 5 MG/5ML
2.5 INJECTION INTRAVENOUS EVERY 10 MIN PRN
Status: DISCONTINUED | OUTPATIENT
Start: 2021-08-28 | End: 2021-09-03 | Stop reason: HOSPADM

## 2021-08-28 RX ORDER — ATORVASTATIN CALCIUM 20 MG/1
20 TABLET, FILM COATED ORAL NIGHTLY
Status: DISCONTINUED | OUTPATIENT
Start: 2021-08-29 | End: 2021-09-03 | Stop reason: HOSPADM

## 2021-08-28 RX ORDER — ALBUMIN, HUMAN INJ 5% 5 %
25 SOLUTION INTRAVENOUS PRN
Status: DISCONTINUED | OUTPATIENT
Start: 2021-08-28 | End: 2021-09-03 | Stop reason: HOSPADM

## 2021-08-28 RX ORDER — POTASSIUM CHLORIDE 29.8 MG/ML
20 INJECTION INTRAVENOUS PRN
Status: DISCONTINUED | OUTPATIENT
Start: 2021-08-28 | End: 2021-09-03 | Stop reason: HOSPADM

## 2021-08-28 RX ORDER — SODIUM CHLORIDE 9 MG/ML
25 INJECTION, SOLUTION INTRAVENOUS PRN
Status: DISCONTINUED | OUTPATIENT
Start: 2021-08-28 | End: 2021-08-29

## 2021-08-28 RX ORDER — AMIODARONE HYDROCHLORIDE 200 MG/1
200 TABLET ORAL 3 TIMES DAILY
Status: DISCONTINUED | OUTPATIENT
Start: 2021-08-28 | End: 2021-08-28

## 2021-08-28 RX ORDER — PROPOFOL 10 MG/ML
INJECTION, EMULSION INTRAVENOUS PRN
Status: DISCONTINUED | OUTPATIENT
Start: 2021-08-28 | End: 2021-08-28 | Stop reason: SDUPTHER

## 2021-08-28 RX ORDER — SODIUM PHOSPHATE, DIBASIC AND SODIUM PHOSPHATE, MONOBASIC 7; 19 G/133ML; G/133ML
1 ENEMA RECTAL DAILY PRN
Status: DISCONTINUED | OUTPATIENT
Start: 2021-08-28 | End: 2021-09-03 | Stop reason: HOSPADM

## 2021-08-28 RX ORDER — ONDANSETRON 2 MG/ML
4 INJECTION INTRAMUSCULAR; INTRAVENOUS EVERY 8 HOURS PRN
Status: DISCONTINUED | OUTPATIENT
Start: 2021-08-28 | End: 2021-09-03 | Stop reason: HOSPADM

## 2021-08-28 RX ORDER — SODIUM CHLORIDE 9 MG/ML
INJECTION, SOLUTION INTRAVENOUS CONTINUOUS PRN
Status: DISCONTINUED | OUTPATIENT
Start: 2021-08-28 | End: 2021-08-28 | Stop reason: SDUPTHER

## 2021-08-28 RX ORDER — PROPOFOL 10 MG/ML
10 INJECTION, EMULSION INTRAVENOUS CONTINUOUS
Status: DISCONTINUED | OUTPATIENT
Start: 2021-08-28 | End: 2021-08-29

## 2021-08-28 RX ORDER — HYDRALAZINE HYDROCHLORIDE 20 MG/ML
5 INJECTION INTRAMUSCULAR; INTRAVENOUS EVERY 5 MIN PRN
Status: DISCONTINUED | OUTPATIENT
Start: 2021-08-28 | End: 2021-09-03 | Stop reason: HOSPADM

## 2021-08-28 RX ORDER — MIDAZOLAM HYDROCHLORIDE 1 MG/ML
INJECTION INTRAMUSCULAR; INTRAVENOUS PRN
Status: DISCONTINUED | OUTPATIENT
Start: 2021-08-28 | End: 2021-08-28 | Stop reason: SDUPTHER

## 2021-08-28 RX ORDER — SODIUM CHLORIDE 9 MG/ML
25 INJECTION, SOLUTION INTRAVENOUS PRN
Status: DISCONTINUED | OUTPATIENT
Start: 2021-08-28 | End: 2021-08-28

## 2021-08-28 RX ORDER — FENTANYL CITRATE 50 UG/ML
INJECTION, SOLUTION INTRAMUSCULAR; INTRAVENOUS PRN
Status: DISCONTINUED | OUTPATIENT
Start: 2021-08-28 | End: 2021-08-28 | Stop reason: SDUPTHER

## 2021-08-28 RX ORDER — ONDANSETRON 2 MG/ML
INJECTION INTRAMUSCULAR; INTRAVENOUS PRN
Status: DISCONTINUED | OUTPATIENT
Start: 2021-08-28 | End: 2021-08-28 | Stop reason: SDUPTHER

## 2021-08-28 RX ORDER — CALCIUM CHLORIDE 100 MG/ML
INJECTION INTRAVENOUS; INTRAVENTRICULAR PRN
Status: DISCONTINUED | OUTPATIENT
Start: 2021-08-28 | End: 2021-08-28 | Stop reason: SDUPTHER

## 2021-08-28 RX ORDER — POTASSIUM CHLORIDE 29.8 MG/ML
INJECTION INTRAVENOUS PRN
Status: DISCONTINUED | OUTPATIENT
Start: 2021-08-28 | End: 2021-08-28 | Stop reason: SDUPTHER

## 2021-08-28 RX ORDER — DIPHENHYDRAMINE HCL 25 MG
25 TABLET ORAL NIGHTLY PRN
Status: DISCONTINUED | OUTPATIENT
Start: 2021-08-29 | End: 2021-09-03 | Stop reason: HOSPADM

## 2021-08-28 RX ORDER — SODIUM CHLORIDE, SODIUM LACTATE, POTASSIUM CHLORIDE, CALCIUM CHLORIDE 600; 310; 30; 20 MG/100ML; MG/100ML; MG/100ML; MG/100ML
INJECTION, SOLUTION INTRAVENOUS CONTINUOUS PRN
Status: DISCONTINUED | OUTPATIENT
Start: 2021-08-28 | End: 2021-08-28 | Stop reason: SDUPTHER

## 2021-08-28 RX ORDER — LIDOCAINE HYDROCHLORIDE 10 MG/ML
INJECTION, SOLUTION INFILTRATION; PERINEURAL
Status: DISCONTINUED
Start: 2021-08-28 | End: 2021-08-28

## 2021-08-28 RX ORDER — IPRATROPIUM BROMIDE AND ALBUTEROL SULFATE 2.5; .5 MG/3ML; MG/3ML
1 SOLUTION RESPIRATORY (INHALATION)
Status: DISCONTINUED | OUTPATIENT
Start: 2021-08-28 | End: 2021-09-02

## 2021-08-28 RX ORDER — CLOPIDOGREL BISULFATE 75 MG/1
75 TABLET ORAL DAILY
Status: DISCONTINUED | OUTPATIENT
Start: 2021-08-29 | End: 2021-08-29

## 2021-08-28 RX ORDER — ASPIRIN 81 MG/1
81 TABLET ORAL DAILY
Status: DISCONTINUED | OUTPATIENT
Start: 2021-08-28 | End: 2021-09-03 | Stop reason: HOSPADM

## 2021-08-28 RX ADMIN — POTASSIUM CHLORIDE 20 MEQ: 29.8 INJECTION, SOLUTION INTRAVENOUS at 22:17

## 2021-08-28 RX ADMIN — FENTANYL CITRATE 100 MCG: 50 INJECTION, SOLUTION INTRAMUSCULAR; INTRAVENOUS at 12:11

## 2021-08-28 RX ADMIN — SODIUM CHLORIDE, POTASSIUM CHLORIDE, SODIUM LACTATE AND CALCIUM CHLORIDE: 600; 310; 30; 20 INJECTION, SOLUTION INTRAVENOUS at 11:27

## 2021-08-28 RX ADMIN — ALBUMIN (HUMAN) 25 G: 12.5 INJECTION, SOLUTION INTRAVENOUS at 13:47

## 2021-08-28 RX ADMIN — CALCIUM CHLORIDE 0.5 G: 100 INJECTION, SOLUTION INTRAVENOUS; INTRAVENTRICULAR at 12:50

## 2021-08-28 RX ADMIN — METOPROLOL TARTRATE 25 MG: 25 TABLET ORAL at 20:40

## 2021-08-28 RX ADMIN — MIDAZOLAM HYDROCHLORIDE 0.5 MG: 1 INJECTION, SOLUTION INTRAMUSCULAR; INTRAVENOUS at 12:10

## 2021-08-28 RX ADMIN — FENTANYL CITRATE 50 MCG: 50 INJECTION, SOLUTION INTRAMUSCULAR; INTRAVENOUS at 12:14

## 2021-08-28 RX ADMIN — SODIUM CHLORIDE 3.27 UNITS/HR: 9 INJECTION, SOLUTION INTRAVENOUS at 17:30

## 2021-08-28 RX ADMIN — AMIODARONE HYDROCHLORIDE 200 MG: 200 TABLET ORAL at 20:40

## 2021-08-28 RX ADMIN — SODIUM CHLORIDE, PRESERVATIVE FREE 10 ML: 5 INJECTION INTRAVENOUS at 17:11

## 2021-08-28 RX ADMIN — CEFAZOLIN SODIUM 2000 MG: 2 SOLUTION INTRAVENOUS at 11:54

## 2021-08-28 RX ADMIN — IPRATROPIUM BROMIDE AND ALBUTEROL SULFATE 1 AMPULE: .5; 2.5 SOLUTION RESPIRATORY (INHALATION) at 17:43

## 2021-08-28 RX ADMIN — POTASSIUM CHLORIDE 20 MEQ: 29.8 INJECTION, SOLUTION INTRAVENOUS at 23:18

## 2021-08-28 RX ADMIN — FENTANYL CITRATE 50 MCG: 50 INJECTION, SOLUTION INTRAMUSCULAR; INTRAVENOUS at 13:45

## 2021-08-28 RX ADMIN — MUPIROCIN: 20 OINTMENT TOPICAL at 20:40

## 2021-08-28 RX ADMIN — FENTANYL CITRATE 250 MCG: 50 INJECTION, SOLUTION INTRAMUSCULAR; INTRAVENOUS at 13:09

## 2021-08-28 RX ADMIN — POTASSIUM CHLORIDE 20 MEQ: 400 INJECTION, SOLUTION INTRAVENOUS at 13:00

## 2021-08-28 RX ADMIN — SODIUM CHLORIDE: 9 INJECTION, SOLUTION INTRAVENOUS at 11:08

## 2021-08-28 RX ADMIN — DEXAMETHASONE SODIUM PHOSPHATE 10 MG: 10 INJECTION INTRAMUSCULAR; INTRAVENOUS at 12:14

## 2021-08-28 RX ADMIN — FUROSEMIDE 20 MG: 10 INJECTION, SOLUTION INTRAMUSCULAR; INTRAVENOUS at 16:00

## 2021-08-28 RX ADMIN — IPRATROPIUM BROMIDE AND ALBUTEROL SULFATE 1 AMPULE: .5; 2.5 SOLUTION RESPIRATORY (INHALATION) at 20:01

## 2021-08-28 RX ADMIN — NITROGLYCERIN 20 MCG/MIN: 20 INJECTION INTRAVENOUS at 16:00

## 2021-08-28 RX ADMIN — METOPROLOL TARTRATE 25 MG: 25 TABLET ORAL at 17:10

## 2021-08-28 RX ADMIN — SODIUM BICARBONATE 50 MEQ: 84 INJECTION, SOLUTION INTRAVENOUS at 12:50

## 2021-08-28 RX ADMIN — MORPHINE SULFATE 2 MG: 2 INJECTION, SOLUTION INTRAMUSCULAR; INTRAVENOUS at 09:51

## 2021-08-28 RX ADMIN — ETOMIDATE 10 MG: 2 INJECTION INTRAVENOUS at 11:32

## 2021-08-28 RX ADMIN — ROCURONIUM BROMIDE 50 MG: 10 INJECTION INTRAVENOUS at 11:32

## 2021-08-28 RX ADMIN — SODIUM CHLORIDE: 9 INJECTION, SOLUTION INTRAVENOUS at 08:30

## 2021-08-28 RX ADMIN — VANCOMYCIN HYDROCHLORIDE 1000 MG: 1 INJECTION, SOLUTION INTRAVENOUS at 17:12

## 2021-08-28 RX ADMIN — BISACODYL 5 MG: 5 TABLET, COATED ORAL at 20:40

## 2021-08-28 RX ADMIN — OXYCODONE HYDROCHLORIDE AND ACETAMINOPHEN 2 TABLET: 5; 325 TABLET ORAL at 13:45

## 2021-08-28 RX ADMIN — PHENYLEPHRINE HYDROCHLORIDE 100 MCG/MIN: 10 INJECTION INTRAVENOUS at 11:32

## 2021-08-28 RX ADMIN — PANTOPRAZOLE SODIUM 40 MG: 40 INJECTION, POWDER, FOR SOLUTION INTRAVENOUS at 17:11

## 2021-08-28 RX ADMIN — PROPOFOL 30 MG: 10 INJECTION, EMULSION INTRAVENOUS at 13:13

## 2021-08-28 RX ADMIN — ONDANSETRON 4 MG: 2 INJECTION, SOLUTION INTRAMUSCULAR; INTRAVENOUS at 12:14

## 2021-08-28 RX ADMIN — MIDAZOLAM HYDROCHLORIDE 1 MG: 1 INJECTION, SOLUTION INTRAMUSCULAR; INTRAVENOUS at 11:13

## 2021-08-28 RX ADMIN — SODIUM CHLORIDE, PRESERVATIVE FREE 10 ML: 5 INJECTION INTRAVENOUS at 20:41

## 2021-08-28 RX ADMIN — SODIUM CHLORIDE: 9 INJECTION, SOLUTION INTRAVENOUS at 12:18

## 2021-08-28 RX ADMIN — FENTANYL CITRATE 50 MCG: 50 INJECTION, SOLUTION INTRAMUSCULAR; INTRAVENOUS at 11:27

## 2021-08-28 RX ADMIN — MIDAZOLAM HYDROCHLORIDE 0.5 MG: 1 INJECTION, SOLUTION INTRAMUSCULAR; INTRAVENOUS at 11:20

## 2021-08-28 RX ADMIN — FUROSEMIDE 20 MG: 10 INJECTION, SOLUTION INTRAMUSCULAR; INTRAVENOUS at 20:40

## 2021-08-28 ASSESSMENT — PULMONARY FUNCTION TESTS
PIF_VALUE: 24
PIF_VALUE: 1
PIF_VALUE: 26
PIF_VALUE: 25
PIF_VALUE: 5
PIF_VALUE: 23
PIF_VALUE: 21
PIF_VALUE: 21
PIF_VALUE: 22
PIF_VALUE: 0
PIF_VALUE: 23
PIF_VALUE: 25
PIF_VALUE: 25
PIF_VALUE: 21
PIF_VALUE: 5
PIF_VALUE: 25
PIF_VALUE: 29
PIF_VALUE: 25
PIF_VALUE: 26
PIF_VALUE: 22
PIF_VALUE: 23
PIF_VALUE: 26
PIF_VALUE: 23
PIF_VALUE: 7
PIF_VALUE: 24
PIF_VALUE: 22
PIF_VALUE: 21
PIF_VALUE: 25
PIF_VALUE: 22
PIF_VALUE: 0
PIF_VALUE: 24
PIF_VALUE: 0
PIF_VALUE: 22
PIF_VALUE: 2
PIF_VALUE: 25
PIF_VALUE: 23
PIF_VALUE: 22
PIF_VALUE: 2
PIF_VALUE: 25
PIF_VALUE: 25
PIF_VALUE: 2
PIF_VALUE: 11
PIF_VALUE: 2
PIF_VALUE: 25
PIF_VALUE: 22
PIF_VALUE: 29
PIF_VALUE: 0
PIF_VALUE: 22
PIF_VALUE: 24
PIF_VALUE: 23
PIF_VALUE: 24
PIF_VALUE: 1
PIF_VALUE: 22
PIF_VALUE: 26
PIF_VALUE: 21
PIF_VALUE: 26
PIF_VALUE: 23
PIF_VALUE: 21
PIF_VALUE: 28
PIF_VALUE: 22
PIF_VALUE: 25
PIF_VALUE: 2
PIF_VALUE: 22
PIF_VALUE: 24
PIF_VALUE: 23
PIF_VALUE: 4
PIF_VALUE: 30
PIF_VALUE: 21
PIF_VALUE: 21
PIF_VALUE: 2
PIF_VALUE: 22
PIF_VALUE: 25
PIF_VALUE: 1
PIF_VALUE: 22
PIF_VALUE: 28
PIF_VALUE: 21
PIF_VALUE: 21
PIF_VALUE: 1
PIF_VALUE: 22
PIF_VALUE: 3
PIF_VALUE: 1
PIF_VALUE: 23
PIF_VALUE: 25
PIF_VALUE: 22
PIF_VALUE: 22
PIF_VALUE: 0
PIF_VALUE: 22
PIF_VALUE: 27
PIF_VALUE: 22
PIF_VALUE: 1
PIF_VALUE: 22
PIF_VALUE: 25
PIF_VALUE: 28
PIF_VALUE: 22
PIF_VALUE: 2
PIF_VALUE: 1
PIF_VALUE: 1
PIF_VALUE: 22
PIF_VALUE: 26
PIF_VALUE: 21
PIF_VALUE: 4
PIF_VALUE: 27
PIF_VALUE: 21
PIF_VALUE: 28
PIF_VALUE: 23
PIF_VALUE: 19
PIF_VALUE: 26
PIF_VALUE: 27
PIF_VALUE: 4
PIF_VALUE: 23
PIF_VALUE: 25
PIF_VALUE: 22
PIF_VALUE: 26
PIF_VALUE: 2
PIF_VALUE: 21
PIF_VALUE: 21
PIF_VALUE: 28
PIF_VALUE: 22
PIF_VALUE: 1
PIF_VALUE: 23
PIF_VALUE: 0
PIF_VALUE: 26
PIF_VALUE: 26
PIF_VALUE: 21
PIF_VALUE: 25
PIF_VALUE: 31
PIF_VALUE: 27
PIF_VALUE: 22
PIF_VALUE: 22
PIF_VALUE: 25
PIF_VALUE: 24
PIF_VALUE: 22
PIF_VALUE: 21
PIF_VALUE: 21
PIF_VALUE: 2
PIF_VALUE: 25
PIF_VALUE: 1
PIF_VALUE: 27

## 2021-08-28 ASSESSMENT — PAIN SCALES - GENERAL: PAINLEVEL_OUTOF10: 7

## 2021-08-28 NOTE — BRIEF OP NOTE
Brief Postoperative Note      Patient: aSm Stover  YOB: 1945  MRN: [id number]    Date of Procedure: 8/28/2021    Pre-Op Diagnosis: CARDIAC TAMPONADE    Post-Op Diagnosis: Same       Procedure(s):  PERICARDIAL WINDOW W/ STERNAL WIRE REMOVAL    Surgeon(s):  Amira Jeff MD    Assistant:  First Assistant: Josiah Thayer RN    Anesthesia: General    Estimated Blood Loss (mL): N/A    Complications: None    Specimens:   * No specimens in log *    Implants:  * No implants in log *      Drains:   Chest Tube 1 Anterior Pericardial 24 Wallisian (Active)       [REMOVED] External Urinary Catheter (Removed)   Catheter changed  Yes 08/27/21 0200   Urine Appearance Clear 08/27/21 0200   Suction 40 mmgHg continuous 08/26/21 2000   Skin Assessment No Injury 08/26/21 2000       Findings: Circumferential dark blood clots especially in the region of the LAD around the LV.  200cc was approximate value but the HR came down immediately to 88 from 150 BPB and the blood pressure remained fine the entire time on no pressors.     Electronically signed by Amira Jeff MD on 8/28/2021 at 1:24 PM

## 2021-08-28 NOTE — PROGRESS NOTES
08/28/21 1737   Vent Information   $Ventilation Off Vent         Order obtained for extubation. SpO2 of 94% on 45% FiO2. Patient extubated and placed on 6 liters/min via nasal cannula. Post extubation SpO2 is 93% with HR  127 bpm and RR 24 breaths/min. Patient had strong cough that was non-productive. Extubation Well tolerated by patient. .   Breath Sounds: clear    JERMAN SMALLWOOD RCP   5:39 PM

## 2021-08-28 NOTE — PROGRESS NOTES
Writer contacted Cardiology Fellow, Dr. Becca Ugalde, at 6190 d/t pt sustaining HR in 140-150s, increased oxygen requirements. Pt was placed on simple mask at 5L. Pt had no c/o of CP/SOB, just felt \"like crap\". Spoke with Dr. Becca Ugalde on the phone, he said no betablockers, and that he would be down to see pt at bedside.

## 2021-08-28 NOTE — PROCEDURES
Date: 8/28/2021  Time: 11:03 AM      PROCEDURE:  Arterial Line (A-Line) Placement    INDICATION: Shock, hemodynamic monitoring    CONTRAINDICATIONS: None    PROCEDURE : Clary Carbajal MD    CONSENT:  Consent was implied due to the critical nature of the procedure. PROCEDURE SUMMARY:     The patient was prepped and draped in the usual sterile manner using chloraprep scrub. Local anesthesia using lidocaine was applied. Right radial artery was palpated in the antecubital fossa and cannulated successfully with a 18-gauge catheter. Line was secured in place with silk suture. Good arterial waveform was seen on the monitor and the line withdrew bright red pulsatile blood and flushed without difficulty. The procedure was declared to be completed successfully and the patient tolerated the procedure well.     COMPLICATIONS: None    ESTIMATED BLOOD LOSS:  1 ml      Electronically signed on 08/28/21 at 11:03 AM by:    Ky Bar MD, MD   Fellow, [address]

## 2021-08-28 NOTE — ANESTHESIA PROCEDURE NOTES
Procedure Performed: TRISHA     Start Time:        End Time:        General Procedure Information  Diagnostic Indications for Echo:  suspected pericardial effusion                Department of Anesthesiology  TRISHA Report         Name:  Otto Ramírez                                         Age:  64 y.o. MRN:  [id number]           Procedure (Scheduled):  TRISHA  Requested by Surgeon: Yaritza Beckham  Performed by Dr. Maxime Turpin: Transesophageal Echo    Today's Date: 8/28/2021    Patient seen and examined. History and Physical reviewed. Labs reviewed. TRISHA:    Structures:    LA: Normal  RA: Normal  RV: Normal size and mild dysfunction  LV: Normal size and moderate dysfunction. Estimated LVEF 30 %  LV apex: No LV apical thrombus identified  Aorta: Mild atheromatous disease Asc Aorta, arch and descending Aorta  Percardium: Large pericardial effusion on Lateral LV and anterior LV and small amount on RV  GSIELL: No appendage thrombus identified  Septum: No intracardiac shunt via color Doppler. Valves:    Mitral Valve: Structurally thickened. mild regurgitation is identified. Severe thickened. Mean gradient 5 mmhg  Aortic Valve: The aortic valve is trileaflet and opens adequately. none stenosis is identified. mild regurgitiation is identified. Tricuspid valve: Structurally normal. none regurgitation is identified. Pulmonary valve: Normal. No significant regurgitation  No valvular vegetations or thrombus identified. Summary:     1. A TRISHA was performed without complications. 2. LVEF 30 % preop. Anterior hypokinesis  3. Pre-op Valvular abnormalities mild MR, severe thickened MV with mean gradient 5 mmHg  4. No Aortic dissection  5. Large pericardial effusion on Lateral LV and anterior LV and small amount on RV  6. Significant findings were communicated to CTS. Electronically signed by Deretha Habermann, MD on 8/28/2021 at 11:54 AM    Summary:     1. A postop TRISHA was performed without complications.   2. LVEF 30 % postop. Anterior hypokinesis  3. Large pericardial effusion on Lateral LV and anterior LV and small amount on RV under TRISHA guidance completed per CTS  4. Significant findings were communicated to CTS.     Electronically signed by Lillian Messer MD on 8/28/2021 at 12:52 PM

## 2021-08-28 NOTE — PROGRESS NOTES
[facility]  Internal Medicine Teaching Residency Program  Inpatient Daily Progress Note  ______________________________________________________________________________    Patient: Salvador Weber  YOB: 1945   OCV:[id number]    Acct: [de-identified]     Room: [address] [place] [address]  Admit date: 8/25/2021  Today's date: 08/28/21  Number of days in the hospital: 2    SUBJECTIVE   Admitting Diagnosis: NSTEMI (non-ST elevated myocardial infarction) ([facility], [place] [address]  CC:   Pt examined at bedside. Chart & results reviewed. No acute events overnight. Hemodynamically stable but has tachycardia, HR around 130s. Chest pain improved, denies SOB, or palpitations. She complains of back pain neck pain, and nausea. Denies vomiting, or abdominal pain. No other fresh complaints. She underwent PTCA 2 days back, KVNG placed in proximal and mid LAD. Two covered stents placed to seal perforation. Follow-up Echo showed moderate pericardial effusion without cardiac tamponade, pericardiocentesis was done with removal of 40 cc of bloody fluids. Abdominal aorta duplex scan-no aortic dissection or dilatation. Calcific plaque in abdominal aorta and common iliac arteries present. Repeat 2D echo pending  Discussion between cardio and CT surgery pertaining to possible pericardial window creation.   Pertinent labs:  Glucose 144, sodium 134  WBC 18.1 -> 22.4  Troponin trending down 1823-> 1752-> 1518    ROS:  Constitutional:  negative for chills, fevers, sweats  Respiratory:  negative for cough, dyspnea on exertion, hemoptysis, shortness of breath, wheezing  Cardiovascular: Positive for chest pain, chest pressure/discomfort, denies lower extremity edema, palpitations  Gastrointestinal:  negative for abdominal pain, constipation, diarrhea, nausea, vomiting  Neurological:  negative for dizziness, headache  BRIEF HISTORY      The patient is a pleasant 64 y.o. female presents with a chief complaint of chest pain in the center of the chest, 7/10 in intensity and radiating to the jaw. Patient received aspirin  and sublingual nitro which decreased her chest pain to 1/10. She denies any dyspnea or diaphoresis. Patient has history of coronary artery disease.     The patient denies any fever, cough, lung disease, orthopnea, PND, headache, nasal discharge, abdominal pain, diarrhea, urinary symptoms.     Patient has history of coronary artery disease and undergone coronary artery bypass grafting at age 45, carotid endarterectomy at age 44. 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     Patient takes aspirin 81 mg, Plavix 75 mg daily, atorvastatin 10 mg daily, biotin, diltiazem 180 mg daily, metoprolol 25 mg twice daily and isosorbide 10 mg by mouth twice daily.     On arrival vitals are temp 98.3, heart rate 61, /70 saturating on room air. Pertinent labs: Sodium 141 potassium 4.7 creatinine 0.93, proBNP 1690, Trop 13, glucose 107, WBC 5.5, Hb 13.4, chest x-ray no acute pulmonary disease and ECG nonspecific T wave abnormality. Her repeat Trop level are 267, 291.     During my examination patient is pain-free, not in any distress, chest is clear without wheezes/crackles, heart sounds normal, no murmurs appreciated, abdomen soft, nontender and no pedal edema.     Patient is admitted as a case of NSTEMI, will consult cardiology. OBJECTIVE     Vital Signs:  /79   Pulse 109   Temp 97.9 °F (36.6 °C) (Oral)   Resp 17   Ht 5' 1\" (1.549 m)   Wt 117 lb (53.1 kg)   SpO2 98%   BMI 22.11 kg/m²     Temp (24hrs), Av.2 °F (36.8 °C), Min:97.9 °F (36.6 °C), Max:98.5 °F (36.9 °C)    In: 815   Out: 1600 [Urine:1500]    Physical Exam:  Constitutional:  64y.o. year old female who is alert, oriented, cooperative and in no apparent distress. Head:normocephalic and atraumatic. EENT:  PERRLA. No conjunctival injections.    Septum was midline, mucosa was without erythema, exudates or cobblestoning. No thrush was noted. Neck: Supple without thyromegaly. No elevated JVP. Trachea was midline. Respiratory: Chest was symmetrical without dullness to percussion. Breath sounds bilaterally were clear to auscultation. There were no wheezes, rhonchi or rales. There is no intercostal retraction or use of accessory muscles. No egophony noted. Cardiovascular: Regular without murmur, clicks, gallops or rubs. Abdomen: Slightly rounded and soft without organomegaly. No rebound, rigidity or guarding was appreciated. Lymphatic: No lymphadenopathy. Musculoskeletal: Normal curvature of the spine. No gross muscle weakness. Extremities:  No lower extremity edema, ulcerations, tenderness, varicosities or erythema. Muscle size, tone and strength are normal.  No involuntary movements are noted. Skin:  Warm and dry. Good color, turgor and pigmentation. No lesions or scars.   No cyanosis or clubbing  Neurological/Psychiatric: The patient's general behavior, level of consciousness, thought content and emotional status is normal.        Medications:  Scheduled Medications:    sodium chloride flush  5-40 mL IntraVENous 2 times per day    atorvastatin  80 mg Oral Nightly    ticagrelor  90 mg Oral BID    aspirin  81 mg Oral Daily    lidocaine  1 patch Transdermal Daily     Continuous Infusions:    nitroGLYCERIN 10 mcg/min (08/27/21 2301)     PRN Medicationspotassium chloride, 40 mEq, PRN   Or  potassium alternative oral replacement, 40 mEq, PRN   Or  potassium chloride, 10 mEq, PRN  potassium chloride, 10 mEq, PRN  prochlorperazine, 5 mg, Q6H PRN  sodium chloride flush, 5-40 mL, PRN  ondansetron, 4 mg, Q6H PRN  hydrALAZINE, 10 mg, Q10 Min PRN  labetalol, 10 mg, Q30 Min PRN  acetaminophen, 650 mg, Q6H PRN   Or  acetaminophen, 650 mg, Q6H PRN        Diagnostic Labs:  CBC:   Recent Labs     08/27/21  0154 08/27/21  0533 08/28/21  0434   WBC 9.9 11.7* 18.1*   RBC 3.76* 3.70* 3.74*   HGB 11.9 11.4* 11.7* HCT 36.7 36.2* 36.5   MCV 97.6 97.8 97.6   RDW 12.8 12.7 13.0    151 162     BMP:   Recent Labs     08/27/21  0533 08/27/21  1642 08/28/21  0434    140 136   K 3.6* 4.2 4.4    103 103   CO2 14* 14* 17*   BUN 20 23 24*   CREATININE 0.94* 0.90 0.81     BNP: No results for input(s): BNP in the last 72 hours. PT/INR: No results for input(s): PROTIME, INR in the last 72 hours. APTT:   Recent Labs     08/27/21  0533 08/27/21  1145 08/27/21  1642   APTT 50.8* 64.3* 24.2     CARDIAC ENZYMES: No results for input(s): CKMB, CKMBINDEX, TROPONINI in the last 72 hours. Invalid input(s): CKTOTAL;3  FASTING LIPID PANEL:  Lab Results   Component Value Date    CHOL 173 10/28/2015    HDL 70 05/07/2021    TRIG 76 10/28/2015     LIVER PROFILE:   Recent Labs     08/25/21  1235 08/27/21  0533 08/27/21  1642   AST 27 170* 191*   ALT 11 28 33   BILIDIR  --   --  0.12   BILITOT 0.68 0.63 0.60   ALKPHOS 63 55 53      MICROBIOLOGY:   Lab Results   Component Value Date/Time    CULTURE NO GROWTH 6 HOURS 08/27/2021 04:15 PM       Imaging:    XR CHEST PORTABLE    Result Date: 8/25/2021  No acute pulmonary disease. Calcific atherosclerotic disease aorta. ASSESSMENT & PLAN        IMPRESSION  This is a 64 y. o. female who presented with typical chest pain that is relieved with nitro and found to have T wave changes on ECG and elevated Trop level. Patient admitted to inpatient as NSTEMI, cardiology consulted and they are planning for cardiac cath     NSTEMI:  Coronary artery disease S/P post CABG. · Underwent cardiac cath with stenting yesterday. · PTCA and KVNG to proximal and mild LAD. · Two stents were placed to cover grade 3 perforation, she became hemodynamically stable. · Follow-up echo shows moderate pericardial effusion with no signs of cardiac tamponade,  EF 30%  · Pericardiocentesis was done, 40 cc of blood removed.   · Troponins trending down 1823-> 1752-> 1518  · X-ray abdomen done to exclude abdominal perforation shows no air under the diaphragm  · Continue aspirin and Brilinta, Lipitor, as per cardiology. · Will consider starting beta-blocker as heart rate and BP tolerates, as advised by cardio. · Will start ACE inhibitors/ARB as BP tolerates, as advised by cardio. · Will need LifeVest prior to discharge, given EF 30%.     DVT ppx:   On heparin    Nausea: On Zofran      PT/OT/SW:   Ongoing    Discharge Planning:    to assist in discharge planning. Rhonda Santa MD  Internal Medicine Resident, PGY-1  [facility];  [place], [address] [place] [address]  8/28/2021, 5:38 AM

## 2021-08-28 NOTE — PLAN OF CARE
Problem: SAFETY  Goal: Free from accidental physical injury  8/27/2021 2012 by Corie Curling, RN  Outcome: Ongoing  8/27/2021 1532 by Candance Hailey, RN  Outcome: Ongoing

## 2021-08-28 NOTE — CARE COORDINATION
I called Oriana Acosta regarding emergent situation cardiac tamponade requiring pericardial window. I went over risks of surgery. Explained patients DNR status as FULLCODE. Brian Daigle agrees to go ahead with emergent surgery to alleviate pericardiac effusion.  Verbal consent given and forms filled out

## 2021-08-28 NOTE — PLAN OF CARE
Problem: SAFETY  Goal: Free from accidental physical injury  8/27/2021 2012 by Rere Claire RN  Outcome: Ongoing  8/27/2021 1532 by Micaela Pereira RN  Outcome: Ongoing     Problem: Pain:  Description: Pain management should include both nonpharmacologic and pharmacologic interventions.   Goal: Pain level will decrease  Description: Pain level will decrease  8/27/2021 2012 by Rere Claire RN  Outcome: Ongoing  8/27/2021 1532 by Micaela Pereira RN  Outcome: Ongoing  Goal: Control of acute pain  Description: Control of acute pain  8/27/2021 2012 by Rere Claire RN  Outcome: Ongoing  8/27/2021 1532 by Micaela Pereira RN  Outcome: Ongoing  Goal: Control of chronic pain  Description: Control of chronic pain  8/27/2021 2012 by Rere Claire RN  Outcome: Ongoing  8/27/2021 1532 by Micaela Pereira RN  Outcome: Ongoing

## 2021-08-28 NOTE — CONSULTS
[address]    GASTROENTEROLOGY CONSULT    Patient:   Gwen Fritz   :    1945   Facility:   [facility]   Date:    2021  Admission Dx:  Chest pain [R07.9]  Chest pain, unspecified type [R07.9]  NSTEMI (non-ST elevated myocardial infarction) [facility]) [I21.4]  Requesting physician: Hanh Wheeler MD  Reason for consult:  Nausea, vomiting*    CC : \"chest pain\"    SUBJECTIVE     HISTORY OF PRESENT ILLNESS  This is a 64 y.o.   female who was admitted 2021 with Chest pain [R07.9]  Chest pain, unspecified type [R07.9]  NSTEMI (non-ST elevated myocardial infarction) ([facility] [address] [I21.4]. We have been asked to see the patient in consultation by Hanh Wheeler MD for nausea, vomiting. Patient is 63 yo F with PMH of CABG (at 45 age), carotid endarterectomy (at 44 age), ICM EF 30%, htn, hld came in with chest pain. Her chest pain was retrosternal, 7/10, radiating to the jaw. On labs, her troponin was elevated and he was found to be having NSTEMI. She was started on heparin drip, nitroglycerin, and was given statin, he underwent cardiac cath s/p KVNG in mid LAD which was complicated by grade 3 perforation and hemodynamic compromise, followed by 2 covered stents to seal perforation and achieving hemodynamic stability. She had moderate pericardial effusion without cardiac tamponade, s/p para cardiocentesis - 40 cc bloody fluid taken out. Subsequently patient had nausea and vomiting, X ray abdomen excluded perforation, no air under diaphragm. Over night patient did not have any nausea or vomiting as per the RN. Repeat echo showed large pericardial effusion around LV, patient undergoing pericardial window today. Summary of imaging completed at this time:  XR chest - chronic pulmonary change with L basilar infiltrate. XR abdomen -  Non specific bowel gas pattern without evidence for obstruction.     Summary of labs completed at this time:  Labs are significant for bicarb 17, BUN 24, MG 2.7  Cardiac profile troponin trending down, 1518 today  LFT -   CBC show WBC 18.1, Hb 11.7      Previous GI history:         OBJECTIVE:     PAST MEDICAL/SURGICAL HISTORY  Past Medical History:   Diagnosis Date    Atherosclerosis     requiring carotid endarterectomy    CAD (coronary artery disease)     Hydatidiform mole     no chemo or radiation    Rosacea      Past Surgical History:   Procedure Laterality Date    CAROTID ENDARTERECTOMY  age 44   [facility] CORONARY ARTERY BYPASS GRAFT  age 45   [facility] [address]    Hydatiform mole pregnancy    TONSILLECTOMY AND ADENOIDECTOMY  age 8       ALLERGIES:  Allergies   Allergen Reactions    Aspirin-Acetaminophen-Caffeine      Unsure of what the exact migraine med was       HOME MEDICATIONS:  Prior to Admission medications    Medication Sig Start Date End Date Taking? Authorizing Provider   metoprolol (LOPRESSOR) 25 MG tablet Take 25 mg by mouth 2 times daily. Yes Historical Provider, MD   ISOSORBIDE Take 10 mg by mouth 2 times daily. Yes Historical Provider, MD   Atorvastatin Calcium (LIPITOR PO) Take 10 mg by mouth daily. Yes Historical Provider, MD   DILTIAZEM HCL Take 180 mg by mouth daily. Yes Historical Provider, MD   aspirin 81 MG EC tablet Take 81 mg by mouth daily. Yes Historical Provider, MD   BIOTIN Take 3,000 Units by mouth daily.    Yes Historical Provider, MD       CURRENT MEDICATIONS:  Scheduled Meds:   sodium chloride flush  5-40 mL IntraVENous 2 times per day    atorvastatin  80 mg Oral Nightly    ticagrelor  90 mg Oral BID    aspirin  81 mg Oral Daily    lidocaine  1 patch Transdermal Daily     Continuous Infusions:   sodium chloride       PRN Meds:potassium chloride **OR** potassium alternative oral replacement **OR** potassium chloride, potassium chloride, prochlorperazine, sodium chloride flush, ondansetron, hydrALAZINE, labetalol, acetaminophen **OR** acetaminophen    SOCIAL HISTORY:     Tobacco:   reports that she has never smoked. She has never used smokeless tobacco.  Alcohol:   reports no history of alcohol use. Illicit drugs:  reports no history of drug use. FAMILY HISTORY:     Family History   Problem Relation Age of Onset    Other Paternal Grandfather         CAD    Other Father         CAD, brain aneurysm    Other Mother         asthma, arthritis, thyroid disease    Other Sister         breast cancer, bowel disease       REVIEW OF SYSTEMS:    Constitutional: No fever, no chills, no lethargy, no weakness. HEENT:  No headache, otalgia, itchy eyes, nasal discharge or sore throat. Cardiac:  No chest pain, dyspnea, orthopnea or PND. Chest:   No cough, phlegm or wheezing. Abdomen:  No abdominal pain, nausea or vomiting. Neuro:  No focal weakness, abnormal movements or seizure like activity. Skin:   No rashes, no itching. :   No hematuria, no pyuria, no dysuria, no flank pain. Extremities:  No swelling or joint pains. ROS was otherwise negative except as mentioned in the [address] PHYSICAL EXAM:    /76   Pulse 118   Temp 97.9 °F (36.6 °C) (Oral)   Resp 18   Ht 5' 1\" (1.549 m)   Wt 107 lb 5.8 oz (48.7 kg)   SpO2 94%   BMI 20.29 kg/m²     GENERAL:   Well developed, Well nourished, No apparent distress  HEAD:   Normocephalic, Atraumatic  EENT:   EOMI, Sclera not icteric, Oropharynx moist   NECK:   Supple, Trachea midline  LUNGS:  CTA Bilaterally  HEART:  RRR, No murmur  ABDOMEN:   Soft, Nontender, Nondistended, BS WNL  EXT:   No clubbing. No cyanosis. No edema. SKIN:   No rashes. No jaundice. No stigmata of liver disease.     MUSC/SKEL:   Adequate muscle bulk for patient's age, No significant synovitis, No deformities  NEURO:  A&O x Three, CN II- XII grossly intact      LABS AND IMAGING:     CBC  Recent Labs     08/27/21  0154 08/27/21  0533 08/28/21  0434   WBC 9.9 11.7* 18.1*   HGB 11.9 11.4* 11.7*   HCT 36.7 36.2* 36.5   MCV 97.6 97.8 97.6   MCH 31.6 30.8 31.3   MCHC 32.4 31.5 32.1    151 162       IMMATURE PLTs  No results found for: PLTFLUORE    BMP  Recent Labs     08/27/21  0533 08/27/21  1642 08/28/21  0434    140 136   K 3.6* 4.2 4.4    103 103   CO2 14* 14* 17*   BUN 20 23 24*   CREATININE 0.94* 0.90 0.81   GLUCOSE 155* 180* 136*   CALCIUM 8.8 9.0 8.9       LFTS  Recent Labs     08/25/21  1235 08/27/21  0533 08/27/21  1642   ALKPHOS 63 55 53   ALT 11 28 33   AST 27 170* 191*   PROT 6.8 6.2* 6.7   BILITOT 0.68 0.63 0.60   BILIDIR  --   --  0.12   LABALBU 4.4 4.1 4.1       AMYLASE/LIPASE/AMMONIA  Recent Labs     08/25/21  1235 08/27/21  1642   LIPASE 19 10*       PT/INR  No results for input(s): PROTIME, INR in the last 72 hours. ANEMIA STUDIES  No results for input(s): IRON, LABIRON, TIBC, UIBC, FERRITIN, [id number], FOLATE, OCCULTBLD in the last 72 hours. IMAGING  ECHO Complete 2D W Doppler W Color    Result Date: 8/27/2021  Transthoracic Echocardiography Report (TTE)  [place] [facility]    Date of Study               08/26/2021               MIHAELA DAY   Date of      1945  Gender                      Female  Birth   Age          64 year(s)  Race                           Room Number  1008        Height:                     61 inch, 154.94 cm   Corporate ID [id number]    Weight:                     112 pounds, 50.8 kg  #   Patient Acct [de-identified]   BSA:          1.48 m^2      BMI:     21.16 kg/m^2  #   MR #         [id number]     Sonographer                 Bull Prajapati   Accession #  [id number]  Interpreting Physician      [address] [place] [address]   Fellow                   Referring Nurse                           Practitioner   Interpreting             Referring Physician         Valerie Poon MD  Fellow  Type of Study   TTE procedure:2D Echocardiogram, M-Mode, Doppler, Color Doppler.   Procedure Date Date: 08/26/2021 Start: 04:26 PM Study Location: [facility] Technical Quality: Adequate visualization ventricular size. TAPSE value of 1.35cm noted. Mitral Valve Mitral valve sclerosis with Moderate Mitral Stenosis. Mean gradient is 7mmHg. PHT = 120msec. MVA by 2D planimetry is 2.28cm2. Trivial mitral regurgitation. Aortic Valve Normal aortic valve structure. Trivial to mild aortic insufficiency. Tricuspid Valve No obvious valvular abnormality. Mild tricuspid regurgitation. No pulmonary hypertension. Estimated right ventricular systolic pressure is 54RZSH. Pulmonic Valve The pulmonic valve is normal in structure. No pulmonic insufficiency. Pericardial Effusion No significant pericardial effusion is seen.  Miscellaneous E/E' average = 44.3 M-mode / 2D Measurements & Calculations:   LVIDd:4 cm(3.7 - 5.6 cm)         Diastolic VLHHEE:07.1 ml  BVBVV:0.5 cm(2.2 - 4.0 cm)       Systolic KZJZOW:76.0 ml  VCIR:4.8 cm(0.6 - 1.1 cm)        Aortic Root:2.5 cm(2.0 - 3.7 cm)  LVPWd:0.8 cm(0.6 - 1.1 cm)       LA Dimension: 3.9 cm(1.9 - 4.0 cm)  Fractional Shortening:15 %       LA volume/Index: 71.27 ml /48m^2  Calculated LVEF (%): 39.16 %     LVOT:1.6 cm                                   RVDd:1.7 cm   Mitral:                                 Aortic   Valve Area (2D): 2.28 cm^2              Peak Velocity: 1.40 m/s  Valve Area (P1/2-Time): 1.83 cm^2       Mean Velocity: 0.86 m/s  Peak E-Wave: 1.52 m/s                   Peak Gradient: 7.84 mmHg  Peak A-Wave: 1.83 m/s                   Mean Gradient: 3 mmHg  E/A Ratio: 0.83  Peak Gradient: 9.24 mmHg  Mean Gradient: 7 mmHg                   Area (continuity): 1.07 cm^2  Deceleration Time: 445 msec             AV VTI: 27.3 cm  P1/2t: 120 msec   Area (continuity): 0.47 cm^2  Mean Velocity: 1.22 m/s   Tricuspid:                              Pulmonic:   Peak TR Velocity: 2.25 m/s              Peak Velocity: 0.90 m/s  Peak TR Gradient: 20.25 mmHg            Peak Gradient: 3.21 mmHg  Diastology / Tissue Doppler Septal Wall E' velocity:0.03 m/s Septal Wall E/E':48.3 Lateral Wall E' velocity:0.04 m/s Lateral Wall E/E':40.3    Echocardiogram Limited 2D Adult    Result Date: 8/27/2021  Transthoracic Echocardiography Report (TTE)  [place] [facility]    Date of Study               08/27/2021               MIHAELA DAY   Date of      1945  Gender                      Female  Birth   Age          64 year(s)  Race                           Room Number  1008        Height:                     61 inch, 154.94 cm   Corporate ID [id number]    Weight:                     112 pounds, 50.8 kg  #   Patient Acct [de-identified]   BSA:          1.48 m^2      BMI:     21.16 kg/m^2  #   MR #         [id number]     Sonographer                 Annabel Ray   Accession #  [id number]  Interpreting Physician      [address] [facility]   Fellow                   Referring Nurse                           Practitioner   Interpreting             Referring Physician         Sharmin Webster  Fellow  Type of Study   TTE procedure:2D Echocardiogram, Limited Echo. Procedure Date Date: 08/27/2021 Start: 11:40 AM Study Location: [facility] Technical Quality: Adequate visualization Indications:Tachycardia and Pericardial effusion. History / Tech. Comments: Echo done at patient bedside. Procedure explained to patient. Patient Status: STAT Height: 61 inches Weight: 112 pounds BSA: 1.48 m^2 BMI: 21.16 kg/m^2 Allergies   - *No Known Allergies. CONCLUSIONS Summary Limited Echo Global left ventricular systolic function is moderately reduced Calculated ejection fraction 40% by Leon's method. Visually estimated EF 35-40%. Moderate mostly anterior and posterior pericardial effusion. No signs of echocardiograhic tamponade. Left pleural effusion. IVC normal diameter & inspiratory collapse indicating normal RA filling pressure .  Signature ----------------------------------------------------------------------------  Electronically signed by Dilip Vieira(Interpreting physician) on 08/27/2021  02:21 PM ---------------------------------------------------------------------------- FINDINGS Left Ventricle Global left ventricular systolic function is moderately reduced Calculated ejection fraction 40% by Leon's method. Visually estimated EF 35-40%. Right Ventricle Right ventricular function appears normal . Pericardial Effusion Moderate anterior and posterior pericardial effusion. No signs of echocardiograhic tamponade. Pleural Effusion Left pleural effusion. Miscellaneous IVC normal diameter & inspiratory collapse indicating normal RA filling pressure . Echocardiogram Limited Adult    Result Date: 8/27/2021  Transthoracic Echocardiography Report (TTE)  Patient Name [facility]    Date of Study               08/26/2021               MIHAELA DAY   Date of      1945  Gender                      Female  Birth   Age          64 year(s)  Race                           Room Number  1008        Height:                     61 inch, 154.94 cm   Corporate ID [id number]    Weight:                     112 pounds, 50.8 kg  #   Patient Acct [de-identified]   BSA:          1.48 m^2      BMI:      21.16  #                                                              kg/m^2   MR #         [id number]     Bolivar Teresa   Accession #  [id number]  Interpreting Physician      [address]   Fellow                   Referring Nurse                           Practitioner   Interpreting             Referring Physician         [address] [place] [address]  Fellow  Type of Study   TTE procedure:2D Echocardiogram, Limited Echo. Procedure Date Date: 08/26/2021 Start: 03:32 PM Study Location: [facility] Indications:Pericardial effusion. History / Tech. Comments: R/0 Tamponade Patient Status: Inpatient Height: 61 inches Weight: 112 pounds BSA: 1.48 m^2 BMI: 21.16 kg/m^2 Allergies   - *No Known Allergies. CONCLUSIONS Summary Limited Echo Moderate posterior and anterior pericardial effusion.  Mitral and tricuspid inflows show no evidence of tamponade physiology. Signature ----------------------------------------------------------------------------  Electronically signed by Bolivar Berman(Sonographer) on 08/26/2021 04:04  PM ---------------------------------------------------------------------------- ----------------------------------------------------------------------------  Electronically signed by Dilip Vieira(Interpreting physician) on 08/27/2021  08:38 AM ---------------------------------------------------------------------------- FINDINGS Pericardial Effusion Moderate posterior and anterior pericardial effusion. Mitral and tricuspid inflows show no evidence of tamponade physiology. Miscellaneous IVC diameter is normal.    XR ABDOMEN (KUB) (SINGLE AP VIEW)    Result Date: 8/27/2021  EXAMINATION: ONE SUPINE XRAY VIEW(S) OF THE ABDOMEN 8/27/2021 1:13 pm COMPARISON: None. HISTORY: ORDERING SYSTEM PROVIDED HISTORY: subq  air? TECHNOLOGIST PROVIDED HISTORY: subq  air? FINDINGS: There is a nonspecific bowel gas pattern without evidence for obstruction. There is no intraperitoneal free air appreciated. There is no suspicious calcification. Contrast is seen in the urinary bladder. There is no acute osseous abnormality. The surrounding soft tissues are unremarkable. Nonspecific bowel gas pattern without evidence for obstruction. XR CHEST PORTABLE    Result Date: 8/27/2021  EXAMINATION: ONE XRAY VIEW OF THE CHEST 8/27/2021 1:13 pm COMPARISON: Chest radiograph performed 08/27/2021. HISTORY: ORDERING SYSTEM PROVIDED HISTORY: ? subq air TECHNOLOGIST PROVIDED HISTORY: ? subq air FINDINGS: There is chronic pulmonary change. There is a left basilar infiltrate. There is no pneumothorax. The mediastinal structures are unremarkable. The upper abdomen is unremarkable. The extrathoracic soft tissues are unremarkable. Chronic pulmonary change with left basilar infiltrate.      XR CHEST PORTABLE    Result Date: 8/25/2021  EXAMINATION: ONE XRAY VIEW OF THE CHEST 8/25/2021 12:50 pm COMPARISON: None. HISTORY: ORDERING SYSTEM PROVIDED HISTORY: chest pain TECHNOLOGIST PROVIDED HISTORY: chest pain Reason for Exam: upr Acuity: Unknown Type of Exam: Unknown FINDINGS: Calcifications involving the aorta reflect atherosclerosis. The cardiomediastinal and hilar silhouettes appear otherwise unremarkable. Chronic appearing coarse interstitial densities predominate perihilar regions and lung bases, typical of sequela from smoking or other previous infectious/inflammatory process. The lungs are hyperinflated with increased translucency both lung apices and tapering of the vasculature in the upper lungs. The lungs appear otherwise clear. No pleural fluid evident. No pneumothorax is seen. No acute osseous abnormality is identified. Sequela from prior open heart surgery. No acute pulmonary disease. Calcific atherosclerotic disease aorta. VL ABDOMINAL AORTA DUPLEX SCAN    Result Date: 8/27/2021    [facility]  Vascular Aortic Iliac Procedure   Patient Name  Maryuri Estrada    Date of Study         08/27/2021                Angélica Chou   Date of Birth 1945  Gender                Female   Age           64 year(s)  Race                     Room Number   1008        Height:               61 inch, 154.94 cm   Corporate ID  [id number]    Weight:               112 pounds, 50.8 kg  #   Patient Acct  [de-identified]   BSA:       1.48 m^2   BMI:         21.16 kg/m^2  #   MR #          [id number]     Sonographer           Ravin Khan RVT, [employer]   Accession #   [id number]  Interpreting          [facility]                            Physician   Referring                 Referring Physician   Nayeli Fuller MD  Nurse  Practitioner  Procedure Type of Study:   Abdominal: Aortic Iliac. Indications for Study:Post-op for vascular reconstruction or endovascular procedure. Patient Status: In Patient.  Technical Quality:Limited +-------------------------------+----+----+-----------+--------------------+ ! Prox SMA                       ! 112 !22.4!           !                    ! +-------------------------------+----+----+-----------+--------------------+ ! Mid Aorta                      ! 111 !    !1.84       !1.8                 ! +-------------------------------+----+----+-----------+--------------------+ ! Dist Aorta                     !90.2!    !1.16       !1.28                ! +-------------------------------+----+----+-----------+--------------------+ +--------++-----+----+-------+-----------++----+----+-------+--------------+ ! ! !Right! ! Left   !           !!    !    !       !              ! +--------++-----+----+-------+-----------++----+----+-------+--------------+ ! Location! !PSV  ! EDV ! AP Diam!Trans Diam !!PSV ! EDV ! AP Diam!Trans Diam    ! +--------++-----+----+-------+-----------++----+----+-------+--------------+ ! Renal   !!172  !27.3!       !           !!50.9!12.4!       !              ! +--------++-----+----+-------+-----------++----+----+-------+--------------+ ! SULEMAN     !!174  !    !0.64   !0.75       !!154 !    !0.79   !0.71          ! +--------++-----+----+-------+-----------++----+----+-------+--------------+      IMPRESSION:     1. Nausea and vomiting secondary to multiple co-morbidities. 2. NSTEMI, s/p PCI to mid LAD. PTCA and KVNG to proximal and mid LAD resulted in grade 3 perforation, hemodynamic compromise, s/p 2 covered stents to seal perforation --> hemodynamic stability. 3. Pericardial effusion 08/25/21, s/p paracardiocentesis 40 cc fluid taken out, bloody; on repeat echo- large pericardial effusion today - cardiac tamponade, requiring pericardial window. 4. CAD s/p CABG  5. HTN  6. HLD  7. ICM EF 35%        Old records, labs and imaging reviewed. PLAN   1. Patient going to OR for pericardial window today. 2. Continue zofran prn.  3. Medical management as per primary.   4. Recommend supportive care. 5. GI will sign off, call GI for any questions or concerns. This plan was formulated in collaboration with Dr. Yesi March . Thank you for allowing us to participate in the care of your patient. Electronically signed by: Chace Morales MD IM resident PGY - 1 on 8/28/2021 at 8:52 AM     Please note that this note was generated using a voice recognition dictation software. Although every effort was made to ensure the accuracy of this automated transcription, some errors in transcription may have occurred.

## 2021-08-28 NOTE — ANESTHESIA PRE PROCEDURE
Department of Anesthesiology  Preprocedure Note       Name:  Henry Son   Age:  64 y.o.  :  1945                                          MRN:  [id number]         Date:  2021      Surgeon: * No surgeons listed *    Procedure: * No procedures listed *    Medications prior to admission:   Prior to Admission medications    Medication Sig Start Date End Date Taking? Authorizing Provider   metoprolol (LOPRESSOR) 25 MG tablet Take 25 mg by mouth 2 times daily. Yes Historical Provider, MD   ISOSORBIDE Take 10 mg by mouth 2 times daily. Yes Historical Provider, MD   Atorvastatin Calcium (LIPITOR PO) Take 10 mg by mouth daily. Yes Historical Provider, MD   DILTIAZEM HCL Take 180 mg by mouth daily. Yes Historical Provider, MD   aspirin 81 MG EC tablet Take 81 mg by mouth daily. Yes Historical Provider, MD   BIOTIN Take 3,000 Units by mouth daily.    Yes Historical Provider, MD       Current medications:    Current Facility-Administered Medications   Medication Dose Route Frequency Provider Last Rate Last Admin    0.9 % sodium chloride infusion   IntraVENous Continuous Mireille Carbajal MD 75 mL/hr at 21 0830 New Bag at 21 0830    metoprolol (LOPRESSOR) 5 MG/5ML injection             morphine (PF) injection 2 mg  2 mg IntraVENous Q4H PRN Jackie Doan MD   2 mg at 21 0951    lidocaine 1 % injection             potassium chloride (KLOR-CON M) extended release tablet 40 mEq  40 mEq Oral PRN Kary Yanez MD   40 mEq at 21 1016    Or    potassium bicarb-citric acid (EFFER-K) effervescent tablet 40 mEq  40 mEq Oral PRN Kary Yanez MD        Or    potassium chloride 10 mEq/100 mL IVPB (Peripheral Line)  10 mEq IntraVENous PRN Kary Yanez MD        potassium chloride 10 mEq/100 mL IVPB (Peripheral Line)  10 mEq IntraVENous PRN Kary Yanez MD        prochlorperazine (COMPAZINE) injection 5 mg  5 mg IntraVENous Q6H PRN Yuriy Schwartz ADENOIDECTOMY  age 8       Social History:    Social History     Tobacco Use    Smoking status: Never Smoker    Smokeless tobacco: Never Used   Substance Use Topics    Alcohol use: No                                Counseling given: Not Answered      Vital Signs (Current):   Vitals:    08/28/21 0500 08/28/21 0546 08/28/21 0600 08/28/21 0700   BP: 109/71  111/64 132/76   Pulse: 112  112 118   Resp: 22 20 18   Temp:       TempSrc:       SpO2: 97%  99% 94%   Weight:  107 lb 5.8 oz (48.7 kg)     Height:                                                  BP Readings from Last 3 Encounters:   08/28/21 132/76   07/16/12 134/82       NPO Status:                                                                                 BMI:   Wt Readings from Last 3 Encounters:   08/28/21 107 lb 5.8 oz (48.7 kg)   07/16/12 115 lb 6.4 oz (52.3 kg)     Body mass index is 20.29 kg/m². CBC:   Lab Results   Component Value Date    WBC 18.1 08/28/2021    RBC 3.74 08/28/2021    RBC 4.33 03/21/2012    HGB 11.7 08/28/2021    HCT 36.5 08/28/2021    MCV 97.6 08/28/2021    RDW 13.0 08/28/2021     08/28/2021     03/21/2012       CMP:   Lab Results   Component Value Date     08/28/2021    K 4.4 08/28/2021     08/28/2021    CO2 17 08/28/2021    BUN 24 08/28/2021    CREATININE 0.81 08/28/2021    GFRAA >60 08/28/2021    LABGLOM >60 08/28/2021    GLUCOSE 136 08/28/2021    GLUCOSE 88 03/21/2012    PROT 6.7 08/27/2021    CALCIUM 8.9 08/28/2021    BILITOT 0.60 08/27/2021    ALKPHOS 53 08/27/2021     08/27/2021    ALT 33 08/27/2021       POC Tests: No results for input(s): POCGLU, POCNA, POCK, POCCL, POCBUN, POCHEMO, POCHCT in the last 72 hours. Coags:   Lab Results   Component Value Date    APTT 24.2 08/27/2021       HCG (If Applicable): No results found for: PREGTESTUR, PREGSERUM, HCG, HCGQUANT     ABGs: No results found for: PHART, PO2ART, XZU5SHR, WLI0WIP, BEART, U5HIQPNQ     Type & Screen (If Applicable):   No results found for: Matilde Net    Drug/Infectious Status (If Applicable):  No results found for: HIV, HEPCAB    COVID-19 Screening (If Applicable): No results found for: COVID19        Anesthesia Evaluation  Patient summary reviewed no history of anesthetic complications:   Airway: Mallampati: II        Dental:          Pulmonary:Negative Pulmonary ROS   (+) decreased breath sounds,                             Cardiovascular:    (+) past MI:, CAD:,         Rhythm: regular  Rate: abnormal                    Neuro/Psych:   Negative Neuro/Psych ROS              GI/Hepatic/Renal: Neg GI/Hepatic/Renal ROS            Endo/Other: Negative Endo/Other ROS                    Abdominal:             Vascular: negative vascular ROS. Other Findings:             Anesthesia Plan      general     ASA 4 - emergent       Induction: intravenous. arterial line    Anesthetic plan and risks discussed with patient and Unable to obtain due to emergent nature. Plan discussed with CRNA. ECHO:   8/26/21  Summary  Left ventricle is normal in size. Global left ventricular systolic function  is moderately reduced. Visually estimated EF 30%. Akinetic apex, mid inferoseptal, apical septal, and apical lateral segments. Severely hypokinetic anteroseptum, apical inferior, and apical anterior. Grade II (moderate) left ventricular diastolic dysfunction. Left atrium is severely dilated. Normal right ventricular size. Right ventricular function appears reduced. Trivial to mild aortic insufficiency. Mitral valve sclerosis with Moderate Mitral Stenosis. Mean gradient is  7mmHg. Mild tricuspid regurgitation. No pulmonary hypertension. Estimated right ventricular systolic pressure is  35GWHO. Cards  Attending note,   The patient was seen and examined. SOB. Tachycardic. Echo reviewed with large pericardial effusion around the left ventricle. Not significant effusion around the RV or possible clotted blood there.  Will be difficult to drain the pericardial effusion percutaneously. Detailed discussion with the CT surgery, Dr. Addis Jameson reviewed echo images, plan for pericardial window. She has alos elevated troponin, trending down, might need repeat cath to look for the LAD stents, however the patient is not a candidate for anticoagulation and will need pericardial drain first, will follow on troponin and consider possible coronary angiography when more stable. I called the  and discussed with him the condition and he agrees to proceed with pericardial window.              Abeba Winter MD   8/28/2021

## 2021-08-28 NOTE — ANESTHESIA PROCEDURE NOTES
Central Venous Line:    A central venous line was placed using surface landmarks, in the OR for the following indication(s):     Sterility preparation included the following: hand hygiene performed prior to procedure, maximum sterile barriers used and sterile technique used to drape from head to toe. The patient was placed in Trendelenburg position. The left subclavian vein was prepped. The site was prepped with Betadine. A 9 Fr (size), 10 (length), introducer slick was placed. During the procedure, the following specific steps were taken: target vein identified, needle advanced into vein and blood aspirated and guidewire advanced into vein. Intravenous verification was obtained by venous blood return. Post insertion care included: all ports aspirated, all ports flushed easily, guidewire removed intact, Biopatch applied, line sutured in place and dressing applied. During the procedure the patient experienced: patient tolerated procedure well with no complications.       Anesthesia type: general..No  Staffing  Performed: Anesthesiologist   Anesthesiologist: Deretha Habermann, MD  Preanesthetic Checklist  Completed: patient identified, IV checked, site marked, risks and benefits discussed, surgical consent, monitors and equipment checked, pre-op evaluation, timeout performed, anesthesia consent given, oxygen available and patient being monitored

## 2021-08-29 ENCOUNTER — APPOINTMENT (OUTPATIENT)
Dept: GENERAL RADIOLOGY | Age: 76
DRG: 270 | End: 2021-08-29
Payer: MEDICARE

## 2021-08-29 LAB
ANION GAP SERPL CALCULATED.3IONS-SCNC: 10 MMOL/L (ref 9–17)
ANION GAP SERPL CALCULATED.3IONS-SCNC: 12 MMOL/L (ref 9–17)
BLD PROD TYP BPU: NORMAL
BUN BLDV-MCNC: 19 MG/DL (ref 8–23)
BUN BLDV-MCNC: 20 MG/DL (ref 8–23)
BUN/CREAT BLD: ABNORMAL (ref 9–20)
BUN/CREAT BLD: ABNORMAL (ref 9–20)
CALCIUM SERPL-MCNC: 8.4 MG/DL (ref 8.6–10.4)
CALCIUM SERPL-MCNC: 8.6 MG/DL (ref 8.6–10.4)
CHLORIDE BLD-SCNC: 99 MMOL/L (ref 98–107)
CHLORIDE BLD-SCNC: 99 MMOL/L (ref 98–107)
CO2: 24 MMOL/L (ref 20–31)
CO2: 27 MMOL/L (ref 20–31)
CREAT SERPL-MCNC: 0.71 MG/DL (ref 0.5–0.9)
CREAT SERPL-MCNC: 0.89 MG/DL (ref 0.5–0.9)
DISPENSE STATUS BLOOD BANK: NORMAL
GFR AFRICAN AMERICAN: >60 ML/MIN
GFR AFRICAN AMERICAN: >60 ML/MIN
GFR NON-AFRICAN AMERICAN: >60 ML/MIN
GFR NON-AFRICAN AMERICAN: >60 ML/MIN
GFR SERPL CREATININE-BSD FRML MDRD: ABNORMAL ML/MIN/{1.73_M2}
GLUCOSE BLD-MCNC: 101 MG/DL (ref 70–99)
GLUCOSE BLD-MCNC: 107 MG/DL (ref 65–105)
GLUCOSE BLD-MCNC: 120 MG/DL (ref 65–105)
GLUCOSE BLD-MCNC: 122 MG/DL (ref 65–105)
GLUCOSE BLD-MCNC: 129 MG/DL (ref 70–99)
GLUCOSE BLD-MCNC: 134 MG/DL (ref 65–105)
GLUCOSE BLD-MCNC: 151 MG/DL (ref 65–105)
GLUCOSE BLD-MCNC: 62 MG/DL (ref 65–105)
GLUCOSE BLD-MCNC: 72 MG/DL (ref 65–105)
GLUCOSE BLD-MCNC: 78 MG/DL (ref 65–105)
GLUCOSE BLD-MCNC: 83 MG/DL (ref 65–105)
GLUCOSE BLD-MCNC: 85 MG/DL (ref 65–105)
HCT VFR BLD CALC: 29.4 % (ref 36.3–47.1)
HCT VFR BLD CALC: 29.8 % (ref 36.3–47.1)
HEMOGLOBIN: 9.4 G/DL (ref 11.9–15.1)
HEMOGLOBIN: 9.4 G/DL (ref 11.9–15.1)
INR BLD: 1
MAGNESIUM: 2.8 MG/DL (ref 1.6–2.6)
MCH RBC QN AUTO: 31.1 PG (ref 25.2–33.5)
MCHC RBC AUTO-ENTMCNC: 32 G/DL (ref 28.4–34.8)
MCV RBC AUTO: 97.4 FL (ref 82.6–102.9)
NRBC AUTOMATED: 0 PER 100 WBC
PDW BLD-RTO: 13.2 % (ref 11.8–14.4)
PLATELET # BLD: 217 K/UL (ref 138–453)
PMV BLD AUTO: 11.9 FL (ref 8.1–13.5)
POTASSIUM SERPL-SCNC: 3.8 MMOL/L (ref 3.7–5.3)
POTASSIUM SERPL-SCNC: 5.1 MMOL/L (ref 3.7–5.3)
PROTHROMBIN TIME: 10.7 SEC (ref 9.1–12.3)
RBC # BLD: 3.02 M/UL (ref 3.95–5.11)
SODIUM BLD-SCNC: 135 MMOL/L (ref 135–144)
SODIUM BLD-SCNC: 136 MMOL/L (ref 135–144)
TRANSFUSION STATUS: NORMAL
UNIT DIVISION: 0
UNIT NUMBER: NORMAL
WBC # BLD: 16.9 K/UL (ref 3.5–11.3)

## 2021-08-29 PROCEDURE — 97530 THERAPEUTIC ACTIVITIES: CPT

## 2021-08-29 PROCEDURE — 85018 HEMOGLOBIN: CPT

## 2021-08-29 PROCEDURE — 2100000001 HC CVICU R&B

## 2021-08-29 PROCEDURE — 2700000000 HC OXYGEN THERAPY PER DAY

## 2021-08-29 PROCEDURE — 94660 CPAP INITIATION&MGMT: CPT

## 2021-08-29 PROCEDURE — 94761 N-INVAS EAR/PLS OXIMETRY MLT: CPT

## 2021-08-29 PROCEDURE — 85027 COMPLETE CBC AUTOMATED: CPT

## 2021-08-29 PROCEDURE — 80048 BASIC METABOLIC PNL TOTAL CA: CPT

## 2021-08-29 PROCEDURE — 94640 AIRWAY INHALATION TREATMENT: CPT

## 2021-08-29 PROCEDURE — 6360000002 HC RX W HCPCS: Performed by: NURSE PRACTITIONER

## 2021-08-29 PROCEDURE — 2580000003 HC RX 258: Performed by: NURSE PRACTITIONER

## 2021-08-29 PROCEDURE — 99024 POSTOP FOLLOW-UP VISIT: CPT | Performed by: NURSE PRACTITIONER

## 2021-08-29 PROCEDURE — 83735 ASSAY OF MAGNESIUM: CPT

## 2021-08-29 PROCEDURE — 2580000003 HC RX 258: Performed by: STUDENT IN AN ORGANIZED HEALTH CARE EDUCATION/TRAINING PROGRAM

## 2021-08-29 PROCEDURE — 97162 PT EVAL MOD COMPLEX 30 MIN: CPT

## 2021-08-29 PROCEDURE — 36415 COLL VENOUS BLD VENIPUNCTURE: CPT

## 2021-08-29 PROCEDURE — 99233 SBSQ HOSP IP/OBS HIGH 50: CPT | Performed by: INTERNAL MEDICINE

## 2021-08-29 PROCEDURE — 85014 HEMATOCRIT: CPT

## 2021-08-29 PROCEDURE — 2500000003 HC RX 250 WO HCPCS: Performed by: THORACIC SURGERY (CARDIOTHORACIC VASCULAR SURGERY)

## 2021-08-29 PROCEDURE — 6370000000 HC RX 637 (ALT 250 FOR IP): Performed by: NURSE PRACTITIONER

## 2021-08-29 PROCEDURE — 85610 PROTHROMBIN TIME: CPT

## 2021-08-29 PROCEDURE — 6370000000 HC RX 637 (ALT 250 FOR IP): Performed by: STUDENT IN AN ORGANIZED HEALTH CARE EDUCATION/TRAINING PROGRAM

## 2021-08-29 PROCEDURE — 71045 X-RAY EXAM CHEST 1 VIEW: CPT

## 2021-08-29 RX ORDER — FUROSEMIDE 10 MG/ML
40 INJECTION INTRAMUSCULAR; INTRAVENOUS ONCE
Status: COMPLETED | OUTPATIENT
Start: 2021-08-29 | End: 2021-08-29

## 2021-08-29 RX ORDER — CARVEDILOL 12.5 MG/1
12.5 TABLET ORAL 2 TIMES DAILY WITH MEALS
Status: DISCONTINUED | OUTPATIENT
Start: 2021-08-29 | End: 2021-08-29

## 2021-08-29 RX ORDER — 0.9 % SODIUM CHLORIDE 0.9 %
250 INTRAVENOUS SOLUTION INTRAVENOUS ONCE
Status: COMPLETED | OUTPATIENT
Start: 2021-08-29 | End: 2021-08-30

## 2021-08-29 RX ORDER — LISINOPRIL 5 MG/1
5 TABLET ORAL DAILY
Status: DISCONTINUED | OUTPATIENT
Start: 2021-08-29 | End: 2021-08-29

## 2021-08-29 RX ORDER — FUROSEMIDE 10 MG/ML
40 INJECTION INTRAMUSCULAR; INTRAVENOUS 2 TIMES DAILY
Status: DISCONTINUED | OUTPATIENT
Start: 2021-08-29 | End: 2021-09-01

## 2021-08-29 RX ORDER — CARVEDILOL 3.12 MG/1
3.12 TABLET ORAL 2 TIMES DAILY WITH MEALS
Status: DISCONTINUED | OUTPATIENT
Start: 2021-08-29 | End: 2021-09-01

## 2021-08-29 RX ORDER — CARVEDILOL 6.25 MG/1
6.25 TABLET ORAL 2 TIMES DAILY WITH MEALS
Status: DISCONTINUED | OUTPATIENT
Start: 2021-08-29 | End: 2021-08-29

## 2021-08-29 RX ORDER — 0.9 % SODIUM CHLORIDE 0.9 %
250 INTRAVENOUS SOLUTION INTRAVENOUS ONCE
Status: DISCONTINUED | OUTPATIENT
Start: 2021-08-29 | End: 2021-08-29

## 2021-08-29 RX ADMIN — TICAGRELOR 90 MG: 90 TABLET ORAL at 09:18

## 2021-08-29 RX ADMIN — IPRATROPIUM BROMIDE AND ALBUTEROL SULFATE 1 AMPULE: .5; 2.5 SOLUTION RESPIRATORY (INHALATION) at 11:13

## 2021-08-29 RX ADMIN — VANCOMYCIN HYDROCHLORIDE 1000 MG: 1 INJECTION, SOLUTION INTRAVENOUS at 15:05

## 2021-08-29 RX ADMIN — OXYCODONE HYDROCHLORIDE AND ACETAMINOPHEN 1 TABLET: 5; 325 TABLET ORAL at 18:49

## 2021-08-29 RX ADMIN — MUPIROCIN: 20 OINTMENT TOPICAL at 09:18

## 2021-08-29 RX ADMIN — MUPIROCIN: 20 OINTMENT TOPICAL at 20:19

## 2021-08-29 RX ADMIN — ACETAMINOPHEN 650 MG: 325 TABLET ORAL at 20:19

## 2021-08-29 RX ADMIN — IPRATROPIUM BROMIDE AND ALBUTEROL SULFATE 1 AMPULE: .5; 2.5 SOLUTION RESPIRATORY (INHALATION) at 15:16

## 2021-08-29 RX ADMIN — POTASSIUM CHLORIDE 20 MEQ: 29.8 INJECTION, SOLUTION INTRAVENOUS at 06:52

## 2021-08-29 RX ADMIN — CARVEDILOL 12.5 MG: 12.5 TABLET, FILM COATED ORAL at 09:18

## 2021-08-29 RX ADMIN — PANTOPRAZOLE SODIUM 40 MG: 40 TABLET, DELAYED RELEASE ORAL at 09:18

## 2021-08-29 RX ADMIN — AMIODARONE HYDROCHLORIDE 200 MG: 200 TABLET ORAL at 09:18

## 2021-08-29 RX ADMIN — TICAGRELOR 90 MG: 90 TABLET ORAL at 20:19

## 2021-08-29 RX ADMIN — IPRATROPIUM BROMIDE AND ALBUTEROL SULFATE 1 AMPULE: .5; 2.5 SOLUTION RESPIRATORY (INHALATION) at 20:02

## 2021-08-29 RX ADMIN — SODIUM CHLORIDE, PRESERVATIVE FREE 10 ML: 5 INJECTION INTRAVENOUS at 20:19

## 2021-08-29 RX ADMIN — POTASSIUM CHLORIDE 20 MEQ: 29.8 INJECTION, SOLUTION INTRAVENOUS at 07:54

## 2021-08-29 RX ADMIN — ASPIRIN 81 MG: 81 TABLET, COATED ORAL at 09:18

## 2021-08-29 RX ADMIN — FUROSEMIDE 40 MG: 10 INJECTION, SOLUTION INTRAMUSCULAR; INTRAVENOUS at 03:27

## 2021-08-29 RX ADMIN — MAGNESIUM SULFATE HEPTAHYDRATE 1000 MG: 1 INJECTION, SOLUTION INTRAVENOUS at 02:54

## 2021-08-29 RX ADMIN — IPRATROPIUM BROMIDE AND ALBUTEROL SULFATE 1 AMPULE: .5; 2.5 SOLUTION RESPIRATORY (INHALATION) at 07:53

## 2021-08-29 RX ADMIN — LISINOPRIL 5 MG: 5 TABLET ORAL at 09:18

## 2021-08-29 RX ADMIN — VANCOMYCIN HYDROCHLORIDE 1000 MG: 1 INJECTION, SOLUTION INTRAVENOUS at 02:12

## 2021-08-29 RX ADMIN — IPRATROPIUM BROMIDE AND ALBUTEROL SULFATE 1 AMPULE: .5; 2.5 SOLUTION RESPIRATORY (INHALATION) at 02:59

## 2021-08-29 RX ADMIN — ATORVASTATIN CALCIUM 20 MG: 20 TABLET, FILM COATED ORAL at 20:19

## 2021-08-29 RX ADMIN — SODIUM CHLORIDE 250 ML: 0.9 INJECTION, SOLUTION INTRAVENOUS at 23:22

## 2021-08-29 RX ADMIN — POLYETHYLENE GLYCOL 3350 17 G: 17 POWDER, FOR SOLUTION ORAL at 10:03

## 2021-08-29 RX ADMIN — SODIUM CHLORIDE, PRESERVATIVE FREE 10 ML: 5 INJECTION INTRAVENOUS at 10:04

## 2021-08-29 RX ADMIN — NITROGLYCERIN 5 MCG/MIN: 20 INJECTION INTRAVENOUS at 01:53

## 2021-08-29 RX ADMIN — MAGNESIUM SULFATE HEPTAHYDRATE 1000 MG: 1 INJECTION, SOLUTION INTRAVENOUS at 01:50

## 2021-08-29 ASSESSMENT — PAIN SCALES - GENERAL
PAINLEVEL_OUTOF10: 3
PAINLEVEL_OUTOF10: 6

## 2021-08-29 ASSESSMENT — PULMONARY FUNCTION TESTS: PIF_VALUE: 19

## 2021-08-29 NOTE — PROGRESS NOTES
Dr. Errol Cartagena rounded on patient updated on status, vitals and labs. Orders for coreg 12.5 bid and 40 lasix bid, if blood pressure is still high contact cardio and they will add something else.  Will continue to monitor     Uma Salter RN

## 2021-08-29 NOTE — PROGRESS NOTES
Units/kg Subcutaneous Nightly     Continuous Infusions:   norepinephrine      dextrose       PRN Meds:sodium chloride flush, ondansetron, acetaminophen, oxyCODONE-acetaminophen **OR** oxyCODONE-acetaminophen, fentanNYL **OR** fentanNYL, hydrALAZINE, metoprolol, sodium bicarbonate, diphenhydrAMINE, bisacodyl, fleet, calcium chloride IVPB, magnesium sulfate, potassium chloride, albumin human, norepinephrine, glucose, dextrose, glucagon (rDNA), dextrose    Beta- Blocker: Yes  Aspirin: Yes  Lovenox: No  GI: Yes  Brilinta: Yes  Coumadin: No  Statin: Yes  ACE: add ACEI today starting low dose-echo EF 35%    Assessment/ Plan:  Pt mentation and status much improved this AM  Keep drain in place  Repeat echo per cardiology recommendations  Lasix 20mg BID today  Dc arauz today-making great urine output  DC art line today   Hemoglobin stable this AM  Repeat HH at 3pm  IS and acapella use 20X/hr  SCDs in place please  Pt evaluation today  Dc planning beginning      On this date 8/25/2021 I have spent 25 minutes reviewing previous notes, test results and face to face with the patient discussing the diagnosis and importance of compliance with the treatment plan as well as documenting on the day of the visit.   ZACH Diaz NP

## 2021-08-29 NOTE — PROGRESS NOTES
[facility]   Progress Note                    Date:   8/29/2021  Patient name:  Collette Landau  Date of admission:  8/25/2021 12:12 PM  MRN:   [id number]  YOB: 1945  PCP:    No primary care provider on file. Reason for Admission:  Chest pain [R07.9]  Chest pain, unspecified type [R07.9]  NSTEMI (non-ST elevated myocardial infarction) ([facility] [address] [I21.4]    Subjective:       Hemodynamically stable  Heart rate on the higher side  -771 Net           Medications:   Scheduled Meds:   sodium chloride flush  10 mL IntraVENous 2 times per day    aspirin  81 mg Oral Daily    clopidogrel  75 mg Oral Daily    amiodarone  200 mg Oral TID    mupirocin   Nasal BID    polyethylene glycol  17 g Oral Daily    metoprolol tartrate  25 mg Oral BID    atorvastatin  20 mg Oral Nightly    pantoprazole  40 mg Oral Daily    pantoprazole  40 mg IntraVENous Daily    And    sodium chloride (PF)  10 mL IntraVENous Daily    vancomycin (VANCOCIN) IV  1,000 mg IntraVENous Q12H    ipratropium-albuterol  1 ampule Inhalation Q4H WA    insulin glargine  0.15 Units/kg Subcutaneous Nightly     Continuous Infusions:   sodium chloride      propofol Stopped (08/28/21 1525)    norepinephrine      insulin Stopped (08/29/21 0606)    dextrose      nitroGLYCERIN Stopped (08/29/21 0324)     CBC:   Recent Labs     08/28/21  1025 08/28/21  1352 08/29/21  0501   WBC 22.4* 19.7* 16.9*   HGB 12.1 9.5* 9.4*    259 217     BMP:    Recent Labs     08/28/21  1025 08/28/21  1025 08/28/21  1341 08/28/21  1352 08/28/21  2101 08/29/21  0501   *  --   --  139  --  135   K 3.9   < >  --  4.3 3.1* 3.8     --   --  106  --  99   CO2 17*  --   --  21  --  24   BUN 22  --   --  19  --  19   CREATININE 0.75  --  0.78 0.64  --  0.71   GLUCOSE 144*  --   --  146*  --  101*    < > = values in this interval not displayed.      Hepatic:   Recent Labs     08/27/21  0533 08/27/21  1642   * 191*   ALT 28 33 BILITOT 0.63 0.60   ALKPHOS 55 53     Recent Labs     08/27/21  1642 08/27/21  2326 08/28/21  0434   TROPONINT NOT REPORTED NOT REPORTED NOT REPORTED       Objective:   Vitals: /71   Pulse 109   Temp 97.5 °F (36.4 °C) (Axillary)   Resp 20   Ht 5' 1\" (1.549 m)   Wt 117 lb (53.1 kg)   SpO2 92%   BMI 22.11 kg/m²    Last Recorded Weight:  [unfilled]    Constitutional and General Appearance:    alert, cooperative, no distress and appears stated age  Respiratory:  · On BIPAP. · Crackles   Cardiovascular:  · The apical impulse is not displaced  · Heart tones are crisp and normal. Regular S1 and S2. No murmurs. Abdomen:   · No masses or tenderness  · Bowel sounds present  Extremities:  ·  No Cyanosis or Clubbing  ·  Lower extremity edema: No  ·  Skin: Warm and dry  Neurological:  · Alert and oriented. Diagnostic Studies:       ECHO:   8/26/21  Summary  Left ventricle is normal in size. Global left ventricular systolic function  is moderately reduced. Visually estimated EF 30%. Akinetic apex, mid inferoseptal, apical septal, and apical lateral segments. Severely hypokinetic anteroseptum, apical inferior, and apical anterior. Grade II (moderate) left ventricular diastolic dysfunction. Left atrium is severely dilated. Normal right ventricular size. Right ventricular function appears reduced. Trivial to mild aortic insufficiency. Mitral valve sclerosis with Moderate Mitral Stenosis. Mean gradient is  7mmHg. Mild tricuspid regurgitation. No pulmonary hypertension. Estimated right ventricular systolic pressure is  61ODBS. Cardiac Angiography:8/26/21  LMCA: Normal 0% stenosis. LAD: Multiple stenosis. Lesion on Mid LAD: 90% stenosis 50 mm length reduced to 0%. Pre procedure     JULIETH III flow was noted. Post Procedure JULIETH III flow was present. Good     runoff was present. The lesion was diagnosed as High Risk (C). The lesion     was diffuse, eccentric and heavily calcified. The lesion showed with     irregular contour, moderate angulation and moderate tortuosity. Comments:Post NC post dilation grade III perforation noted with     extravasation into pericardial space. 2 Covered stents were used to seal     perforation with good results. Devices used         - Luge Wire 182 cm. Number of passes: 1.         - Mini Trek Balloon 2.5mm x 30mm. 2 inflation(s) to a max pressure of:     14 [place]. - Xience Claudia 3.0 x 38 KVNG. 1 inflation(s) to a max pressure of: 16     [place]. - NC Trek 3.25mm x 15mm. 4 inflation(s) to a max pressure of: 18 [place]. - Papyrus Covered Stent 3.0 x 26. 1 inflation(s) to a max pressure of:     14 [place]. - Papyrus Covered Stent 3.0 x 15. 1 inflation(s) to a max pressure of:     10 [place]. - Xience Claudia 2.75 x 28 KVNG. 1 inflation(s) to a max pressure of: 12     [place]. Lesion on Prox LAD: 90% stenosis 24 mm length reduced to 0%. Pre     procedure JULIETH III flow was noted. Post Procedure JULIETH III flow was     present. Good runoff was present. The lesion was diagnosed as High Risk     (C). Devices used         - Xience Claudia 3.25 x 28 KVNG. 1 inflation(s) to a max pressure of: 16     [place]. - 6 Fr. Guideliner Catheter. Number of passes: 1. LCx: Diffuse irregularities 20-30%. RCA: Diffuse irregularities 20-30%. Small. non-dominant. Graft Lesions         Lesion on Aorta Left to Mid LAD: Proximal body. 90% stenosis. Lesion on Aorta Left to Mid LAD: Middle body. 95% stenosis. Lesion on Aorta Left to Mid LAD: Distal body. 90% stenosis. Cardiac Grafts        -  There is a Vein graft that originates at the Aorta Left and attaches to       the Mid LAD. Multiple stenosis         The LV gram was performed in the PAYNE 30 position. LVEF: 35 %        Conclusions:          Severe diffuse heavily calcified proximal and mid Long LAD stenosis.     SVG-LAD is degenerated with multiple 90-95% stenosis and

## 2021-08-29 NOTE — PLAN OF CARE
Problem: SAFETY  Goal: Free from accidental physical injury  Outcome: Ongoing     Problem: PAIN  Goal: Patient's pain/discomfort is manageable  Outcome: Ongoing     Problem: Pain:  Goal: Pain level will decrease  Description: Pain level will decrease  Outcome: Ongoing  Goal: Control of acute pain  Description: Control of acute pain  Outcome: Ongoing  Goal: Control of chronic pain  Description: Control of chronic pain  Outcome: Ongoing     Problem: Skin Integrity:  Goal: Will show no infection signs and symptoms  Description: Will show no infection signs and symptoms  Outcome: Ongoing  Goal: Absence of new skin breakdown  Description: Absence of new skin breakdown  Outcome: Ongoing     Problem: Falls - Risk of:  Goal: Will remain free from falls  Description: Will remain free from falls  Outcome: Ongoing  Goal: Absence of physical injury  Description: Absence of physical injury  Outcome: Ongoing     Problem: OXYGENATION/RESPIRATORY FUNCTION  Goal: Patient will maintain patent airway  Outcome: Ongoing  Goal: Patient will achieve/maintain normal respiratory rate/effort  Description: Respiratory rate and effort will be within normal limits for the patient  Outcome: Ongoing     Problem: SKIN INTEGRITY  Goal: Skin integrity is maintained or improved  Outcome: Ongoing

## 2021-08-29 NOTE — PROGRESS NOTES
Pt was short of breath despite breathing treatment and repositioning. Applied full mask NIV per order, pt clement well, states she is much less short of breath. CHRIS Gonzales informed.

## 2021-08-29 NOTE — PROGRESS NOTES
Perfectserved Dr. Chris Willis about patients low bp aske if he still wanted 40 mg lasix given. Waiting on call back    Orders to hold lasix and he said he would decrease coreg dose.     Ozzy Beavers RN

## 2021-08-29 NOTE — PROGRESS NOTES
[facility]  Internal Medicine Teaching Residency Program  Inpatient Daily Progress Note  ______________________________________________________________________________    Patient: Wale Adrian  YOB: 1945   PPJ:[id number]    Acct: [de-identified]     Room: [address] [place] [address]  Admit date: 8/25/2021  Today's date: 08/29/21  Number of days in the hospital: 3    SUBJECTIVE   Admitting Diagnosis: NSTEMI (non-ST elevated myocardial infarction) ([facility] [address]  CC:   Pt examined at bedside. Chart & results reviewed. No acute issues overnight  Patient is tachycardic, NC 6L, spo2 99 %  Multiple ecchymosis over upper back   Hb 9.4, wbc 16.9  Pericardial window placed yesterday for tamponade, 200 cc blood removed  Drain in place  Arterial and central venous line placed yesterday      ROS:  Constitutional:  negative for chills, fevers, sweats  Respiratory:  negative for cough, dyspnea on exertion, hemoptysis, shortness of breath, wheezing  Cardiovascular:  negative for chest pain, chest pressure/discomfort, lower extremity edema, palpitations  Gastrointestinal:  negative for abdominal pain, constipation, diarrhea, nausea, vomiting  Neurological:  negative for dizziness, headache  BRIEF HISTORY     The patient is a pleasant 76 y. o. female presents with a chief complaint of chest pain in the center of the chest, 7/10 in intensity and radiating to the jaw.  Patient received aspirin  and sublingual nitro which decreased her chest pain to 1/10.  She denies any dyspnea or diaphoresis.  Patient has history of coronary artery disease.     The patient denies any fever, cough, lung disease, orthopnea, PND, headache, nasal discharge, abdominal pain, diarrhea, urinary symptoms.     Patient has history of coronary artery disease and undergone coronary artery bypass grafting at age 51, carotid endarterectomy at age 50.  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     Patient takes aspirin 81 mg, Plavix 75 mg daily, atorvastatin 10 mg daily, biotin, diltiazem 180 mg daily, metoprolol 25 mg twice daily and isosorbide 10 mg by mouth twice daily.     On arrival vitals are temp 98.3, heart rate 61, /70 saturating on room air.   Pertinent labs: Sodium 141 potassium 4.7 creatinine 0.93, proBNP 1690, Trop 13, glucose 107, WBC 5.5, Hb 13.4, chest x-ray no acute pulmonary disease and ECG nonspecific T wave abnormality. Her repeat Trop level are 267, 291.     During my examination patient is pain-free, not in any distress, chest is clear without wheezes/crackles, heart sounds normal, no murmurs appreciated, abdomen soft, nontender and no pedal edema.     Patient is diagnosed with NSTEMI, PCI to mid LAD, PT and KVNG proximal and mid LAD complicated by grade 3 perforation causing hemodynamic compromise responded to 2 course terms to seal the perforation, hemodynamic stable now. Patient developed cardiac tamponade, CT surgery was consulted and pericardial drain was placed in the removal of 200 cc of blood. Postop TRISHA show LVEF 30%. Patient started on ASA, Brilinta, Lipitor, Coreg 12.5, Lasix 40 mg twice daily. Central venous line and arterial line were placed. Patient is tachycardic now, maintaining saturation on NC 6 L, multiple ecchymosis noted over upper back. We will repeat echo today. Trop trending down. OBJECTIVE     Vital Signs:  /71   Pulse 109   Temp 97.5 °F (36.4 °C) (Axillary)   Resp 20   Ht 5' 1\" (1.549 m)   Wt 107 lb 5.8 oz (48.7 kg)   SpO2 92%   BMI 20.29 kg/m²     Temp (24hrs), Av.9 °F (36.6 °C), Min:96.9 °F (36.1 °C), Max:98.4 °F (36.9 °C)    In: 3221.4   Out: 3220 [Urine:2910]    Physical Exam:  Constitutional: Alert, oriented, cooperative and in no apparent distress. Head:normocephalic and atraumatic. EENT:  PERRLA. No conjunctival injections.    Septum was midline, mucosa was without erythema, exudates or cobblestoning. No thrush was noted. Neck: Supple without thyromegaly. No elevated JVP. Trachea was midline. Respiratory: Chest was symmetrical without dullness to percussion. Breath sounds bilaterally fine crackles at the back. There were no wheezes, rhonchi or rales. There is no intercostal retraction or use of accessory muscles. No egophony noted. Cardiovascular: Regular without murmur, clicks, gallops or rubs. Abdomen: Slightly rounded and soft without organomegaly. No rebound, rigidity or guarding was appreciated. Extremities:  No lower extremity edema, ulcerations, tenderness, varicosities or erythema. Muscle size, tone and strength are normal.  No involuntary movements are noted. Skin:  Multiple ecchymosis over upper back,Warm and dry. Good color, turgor and pigmentation. No lesions or scars.   No cyanosis or clubbing  Neurological/Psychiatric: The patient's general behavior, level of consciousness, thought content and emotional status is normal.        Medications:  Scheduled Medications:    sodium chloride flush  10 mL IntraVENous 2 times per day    aspirin  81 mg Oral Daily    clopidogrel  75 mg Oral Daily    amiodarone  200 mg Oral TID    mupirocin   Nasal BID    polyethylene glycol  17 g Oral Daily    metoprolol tartrate  25 mg Oral BID    atorvastatin  20 mg Oral Nightly    pantoprazole  40 mg Oral Daily    pantoprazole  40 mg IntraVENous Daily    And    sodium chloride (PF)  10 mL IntraVENous Daily    vancomycin (VANCOCIN) IV  1,000 mg IntraVENous Q12H    ipratropium-albuterol  1 ampule Inhalation Q4H WA    insulin glargine  0.15 Units/kg Subcutaneous Nightly     Continuous Infusions:    sodium chloride      propofol Stopped (08/28/21 1525)    norepinephrine      insulin Stopped (08/29/21 0606)    dextrose      nitroGLYCERIN Stopped (08/29/21 0324)     PRN Medicationssodium chloride flush, 10 mL, PRN  sodium chloride, 25 mL, PRN  ondansetron, 4 mg, Q8H PRN  acetaminophen, 650 mg, Q4H PRN  oxyCODONE-acetaminophen, 1 tablet, Q4H PRN   Or  oxyCODONE-acetaminophen, 2 tablet, Q4H PRN  fentanNYL, 25 mcg, Q1H PRN   Or  fentanNYL, 50 mcg, Q1H PRN  hydrALAZINE, 5 mg, Q5 Min PRN  metoprolol, 2.5 mg, Q10 Min PRN  sodium bicarbonate, 50 mEq, Q30 Min PRN  diphenhydrAMINE, 25 mg, Nightly PRN  bisacodyl, 5 mg, Daily PRN  fleet, 1 enema, Daily PRN  calcium chloride IVPB, 1,000 mg, PRN  magnesium sulfate, 1,000 mg, PRN  potassium chloride, 20 mEq, PRN  albumin human, 25 g, PRN  norepinephrine, 0.2 mcg/kg/min, Continuous PRN  glucose, 15 g, PRN  dextrose, 12.5 g, PRN  glucagon (rDNA), 1 mg, PRN  dextrose, 100 mL/hr, PRN        Diagnostic Labs:  CBC:   Recent Labs     08/28/21  1025 08/28/21  1352 08/29/21  0501   WBC 22.4* 19.7* 16.9*   RBC 3.84* 3.01* 3.02*   HGB 12.1 9.5* 9.4*   HCT 37.1 29.2* 29.4*   MCV 96.6 97.0 97.4   RDW 13.1 13.1 13.2    259 217     BMP:   Recent Labs     08/28/21  1025 08/28/21  1025 08/28/21  1341 08/28/21  1352 08/28/21  2101 08/29/21  0501   *  --   --  139  --  135   K 3.9   < >  --  4.3 3.1* 3.8     --   --  106  --  99   CO2 17*  --   --  21  --  24   BUN 22  --   --  19  --  19   CREATININE 0.75  --  0.78 0.64  --  0.71    < > = values in this interval not displayed. BNP: No results for input(s): BNP in the last 72 hours. PT/INR:   Recent Labs     08/28/21  1352 08/29/21  0501   PROTIME 11.2 10.7   INR 1.1 1.0     APTT:   Recent Labs     08/27/21  1642 08/28/21  1105 08/28/21  1352   APTT 24.2 20.4* 21.7     CARDIAC ENZYMES: No results for input(s): CKMB, CKMBINDEX, TROPONINI in the last 72 hours.     Invalid input(s): CKTOTAL;3  FASTING LIPID PANEL:  Lab Results   Component Value Date    CHOL 173 10/28/2015    HDL 70 05/07/2021    TRIG 76 10/28/2015     LIVER PROFILE:   Recent Labs     08/27/21  0533 08/27/21  1642   * 191*   ALT 28 33   BILIDIR  --  0.12   BILITOT 0.63 0.60   ALKPHOS 55 53      MICROBIOLOGY:   Lab Results   Component Value Date/Time    CULTURE NO GROWTH 2 DAYS 08/27/2021 04:15 PM       Imaging:    XR ABDOMEN (KUB) (SINGLE AP VIEW)    Result Date: 8/27/2021  Nonspecific bowel gas pattern without evidence for obstruction. XR CHEST PORTABLE    Result Date: 8/28/2021  Status post open heart surgery; tubes and lines appear satisfactory. Likely pulmonary edema, as discussed above. No pneumothorax or large pleural effusion. XR CHEST PORTABLE    Result Date: 8/27/2021  Chronic pulmonary change with left basilar infiltrate. XR CHEST PORTABLE    Result Date: 8/25/2021  No acute pulmonary disease. Calcific atherosclerotic disease aorta. NSTEMI s/p PCI to mid LAD.    · PT and KVNG proximal and mid  LAD, complicated by grade 3 perforation causing hemodynamic compromise. · Responded to 2 covered stents to seal the preparation, hemodynamic stability achieved  · TTE shows global left ventricular systolic dysfunction, EF 35 to 40%, abnormal motion of the anterior and lateral walls of left ventricle. · Trending down 1823> 1752> 1518  · Continue ASA and Brilinta, Lipitor, low-dose beta-blocker, amiodarone 200mg TID,IV Lasix 40 mg daily      CAD s/p CABG:  · S/p PCI to mid LAD  · Continue ASA, Brilinta, Lipitor, low-dose beta-blocker, diuretics    Cardiac Tamponade :    · TTE showed : Global left ventricular systolic function moderately reduced , large loculated pericardial effusion is seen mostly posteriorly near the left ventricle some compression of the ventricle noted. · Pericardial window with sternal wire removal was done for cardiac tamponade  ·  Blood clot especially in the region of LAD and around LV,200 cc was removed   · Heart rate went from 150 to 88, BP remained stable  · Central venous line and arterial line are placed  · Postop TRISHA: LVEF 30% postop, anterior hypokinesis, large pericardial effusion on lateral LV and anterior RV.   · Continue Coreg 12.5 mg twice daily and Lasix 40 mg twice

## 2021-08-29 NOTE — PROGRESS NOTES
Corey Velasquez 1959     Office/Outpatient Visit    Visit Date: Tue, Oct 1, 2019 09:51 am    Provider: Cleopatra Higginbotham MD (Assistant: Lyric Croft MA)    Location: [facility]        Electronically signed by Cleopatra Higginbotham MD on  10/01/2019 01:32:32 PM                             SUBJECTIVE:        CC:     Herrera is a 60 year old White male.  Patient presents today for physical exam         HPI: Herrera is here today for annual physical.  He is UTD on colonoscopy, last done 9/2018.  This had poor prep but was found to be normal.  He is due for prostate cancer screening.  He is UTD on Havrix #1 (9/2018),  Shingrix #1 (12/2018).  He is due for Shingrix #2, Havrix #2, Td, and flu.  He is due for routine labs including HTN panel.          He is on Adderall XR 30 mg daily for ADD.  He has been stable on this regimen for years.  Focus and concentration are well controlled.  No adverse effects.  He was originally diagnosed by Dr. Zeng many years ago.  He does not take drug holidays.         With regard to the health checkup,         PHQ-9 Depression Screening: Completed form scanned and in chart; Total Score 9     ROS:     CONSTITUTIONAL:  Negative for chills and fever.      EYES:  Negative for blurred vision.      E/N/T:  Negative for diminished hearing and nasal congestion.      CARDIOVASCULAR:  Negative for chest pain and palpitations.      RESPIRATORY:  Negative for recent cough and dyspnea.      GASTROINTESTINAL:  Negative for abdominal pain, anorexia, constipation, diarrhea, nausea and vomiting.      GENITOURINARY:  Negative for nocturia and change in urine stream.      MUSCULOSKELETAL:  Negative for arthralgias and myalgias.      NEUROLOGICAL:  Negative for paresthesias and weakness.      PSYCHIATRIC:  Negative for anxiety, depression, feelings of stress, anhedonia, difficulty concentrating and sleep disturbance.          PMH/FMH/SH:     Last Reviewed on 10/01/2019 09:59 AM by Neftaly  Physical Therapy    Facility/Department: [facility] 1  Initial Assessment    NAME: Milly Miguel  : 1945  MRN: [id number]    Date of Service: 2021  Date of Procedure: 2021   Pre-Op Diagnosis: CARDIAC TAMPONADE   Post-Op Diagnosis: Same  Procedure(s):  PERICARDIAL WINDOW W/ STERNAL WIRE REMOVAL  Surgeon(s):  Stefan Hernandez MD  Discharge Recommendations:  Patient would benefit from continued therapy after discharge   PT Equipment Recommendations  Equipment Needed: No  Other: Pt has rolling walker. Assessment   Body structures, Functions, Activity limitations: Decreased functional mobility ; Decreased endurance;Decreased strength  Prognosis: Good  Decision Making: Medium Complexity  PT Education: Plan of Care;General Safety;Goals  REQUIRES PT FOLLOW UP: Yes  Activity Tolerance  Activity Tolerance: Patient limited by fatigue;Patient limited by endurance       Patient Diagnosis(es): The encounter diagnosis was Chest pain, unspecified type. has a past medical history of Atherosclerosis, CAD (coronary artery disease), Hydatidiform mole, and Rosacea. has a past surgical history that includes Carotid endarterectomy (age 44); Coronary artery bypass graft (age 45); Tonsillectomy and adenoidectomy (age 8); and Dilation & curettage (). Restrictions  Restrictions/Precautions  Restrictions/Precautions: Up as Tolerated, Surgical Protocols, Cardiac, Fall Risk  Required Braces or Orthoses?: No  Vision/Hearing  Vision: Impaired  Vision Exceptions: Wears glasses at all times  Hearing: Within functional limits     Subjective  General  Patient assessed for rehabilitation services?: Yes  Family / Caregiver Present: Yes (Spouse)  Follows Commands: Within Functional Limits  General Comment  Comments: Pt supine in bed on 5L 02.  Sp02 at 98%  Pain Screening  Patient Currently in Pain: Denies  Vital Signs  Patient Currently in Pain: Denies       Orientation  Orientation  Overall Orientation Status: Within "Cleopatra    Past Medical History:     UNREMARKABLE         PREVENTIVE HEALTH MAINTENANCE             COLORECTAL CANCER SCREENING: Up to date (colonoscopy q10y; sigmoidoscopy q5y; Cologuard q3y) was last done 2018, Results are in chart; colonoscopy with normal results; poor prep     Hepatitis C Medicare Screening: was last done 2017         Surgical History:         Cholecystectomy: ;     Joint Replacement: L TKA; 2016;      Tonsillectomy/Adenoidectomy    ortho right hand;     Positive for    Cataract Removal: bilateral; ;     Positive for    Colonoscopy ( , NEG; ) and    EGD -GASTRITIS;;         Family History:     Father:  at age 80;  Myocardial Infarction;  brain tumor     Mother:  at age 78; Cause of death was pneumonia;  Lymphoma     Brother(s): Healthy; 1 brother(s) total         Social History:     Occupation: fire rahel;     Marital Status:  Rosana \"Archana\"     Children: 2 children         Tobacco/Alcohol/Supplements:     Last Reviewed on 10/01/2019 09:59 AM by Cleopatra Higginbotham    Tobacco: He has a past history of cigarette smoking; quit date:  1/15/2012.  Non-drinker     Caffeine:  He admits to consuming caffeine via coffee ( 6 servings per day ).          Substance Abuse History:     Last Reviewed on 10/01/2019 09:59 AM by Cleopatra Higginbotham        Mental Health History:     Last Reviewed on 10/01/2019 09:59 AM by Cleopatra Higginbotham        Communicable Diseases (eg STDs):     Last Reviewed on 10/01/2019 09:59 AM by Cleopatra Higginbotham            Current Problems:     Last Reviewed on 2019 10:21 AM by Cleopatra Higginbotham    Use of high risk medications     Osteoarthritis of knee     ADD     Depression     Hypertension     Nodular prostate, without urinary obstruction     Tachycardia, unspecified     Artificial joint replacement, Knee     Unspecified anemia     Hydrocele, other specified type     Other specified gastritis, without mention of hemorrhage     Screening for depression     " Functional Limits  Social/Functional History  Social/Functional History  Lives With: Spouse  Type of Home: House  Home Layout: Two level, 1/2 bath on main level, Bed/Bath upstairs (Pt sleeps on sofa with dog.)  Home Access: Stairs to enter with rails  Entrance Stairs - Number of Steps: 2  Bathroom Shower/Tub: Tub/Shower unit, Walk-in shower  Bathroom Toilet: Standard  Bathroom Equipment: Grab bars in [address]: Rolling walker  ADL Assistance: Independent  Homemaking Assistance: Independent  Ambulation Assistance: Independent  Transfer Assistance: Independent  Active : Yes  Occupation: Retired  Leisure & Hobbies: Active. Walks dog. Cognition   Cognition  Overall Cognitive Status: WFL    Objective    AROM RLE (degrees)  RLE AROM: WFL  AROM LLE (degrees)  LLE AROM : WFL  AROM RUE (degrees)  RUE AROM : WFL  AROM LUE (degrees)  LUE AROM : WFL  Strength Other  Other: Moves all extremities antigravity. Motor Control  Gross Motor?: WFL  Sensation  Overall Sensation Status: WFL  Bed mobility  Rolling to Left: Minimal assistance  Supine to Sit: Minimal assistance  Scooting: Minimal assistance  Pt sat EOB x 5 minutes d/t dizziness. BP in sitting 93/62  Transfers  Sit to Stand: Moderate Assistance  Stand to sit: Moderate Assistance  After first sit> stand pt c/o increased dizziness requiring pt to sit. On second attempt nursing was present for SBA and patient was able to get to chair. Bed to Chair: Moderate assistance  Stand Pivot Transfers: Moderate Assistance  Ambulation  Ambulation?: Yes  Ambulation 1  Surface: level tile  Device: Rolling Walker  Assistance: Moderate assistance  Distance: Pt took 5 steps to chair.   Stairs/Curb  Stairs?: No     Balance  Sitting - Static: Good;-  Sitting - Dynamic: Fair;+  Standing - Static: Fair;+  Standing - Dynamic: Fair  Comments: Standing with rolling walker        Plan   Plan  Times per week: 7x/week  Current Treatment Recommendations: Transfer Training, Endurance     Immunizations:     Hep A, adult dose 9/4/2018     Fluarix pf 10/1/2016     Fluzone (3 + years dose) 10/16/2017     Fluzone Quadrivalent (3+ years) 12/10/2018         Allergies:     Last Reviewed on 7/03/2019 10:21 AM by Cleopatra Higginbotham    Versed:    Ambien:        Current Medications:     Last Reviewed on 7/03/2019 10:21 AM by Cleopatra Higginbotham    Adderall XR 30mg Capsules, Extended Release 1 tab daily     Metoprolol Succinate 25mg Tablets, Extended Release 1 tab PO daily     Prozac 40mg Capsules Take 1 capsule(s) by mouth     OTC Advil Take two tablets in the morning     OTC Iron Supplement 28mg Take one tablet once daily     OTC Vitamin B Complex w/ Vitamin C Take one tablet once daily         OBJECTIVE:        Vitals:         Current: 10/1/2019 9:56:07 AM    Ht:  5 ft, 6 in;  Wt: 216.4 lbs;  BMI: 34.9    T: 98.7 F (oral);  BP: 142/92 mm Hg (left arm, sitting);  P: 104 bpm (left arm (BP Cuff), sitting);  sCr: 0.82 mg/dL;  GFR: 93.89        Repeat:     10:16:28 AM     BP:   133/94mm Hg (left arm, sitting, HR: 103)         Exams:     PHYSICAL EXAM:     GENERAL: Vitals recorded well developed, well nourished;  well groomed;  no apparent distress;     EYES: extraocular movements intact; conjunctiva and cornea are normal; PERRLA;     E/N/T: OROPHARYNX: posterior pharynx shows no exudate and erythema;     RESPIRATORY: normal respiratory rate and pattern with no distress; normal breath sounds with no rales, rhonchi, wheezes or rubs;     CARDIOVASCULAR: normal rate; rhythm is regular;  no systolic murmur; no edema;     GASTROINTESTINAL: nontender; normal bowel sounds;     MUSCULOSKELETAL: normal gait; normal overall tone     NEUROLOGIC: mental status: alert and oriented x 3; reflexes: brachioradialis: 2+; knee jerks: 2+;     PSYCHIATRIC: appropriate affect and demeanor; normal psychomotor function; normal speech pattern; normal thought and perception;         Lab/Test Results:             Amphetamines Screen, Urin:   Training, Strengthening, Gait Training, Functional Mobility Training, Stair training, Safety Education & Training  Safety Devices  Type of devices: Call light within reach, Left in chair, Nurse notified       AM-PAC Score  AM-PAC Inpatient Mobility Raw Score : 13 (08/29/21 1137)  AM-PAC Inpatient T-Scale Score : 36.74 (08/29/21 1137)  Mobility Inpatient CMS 0-100% Score: 64.91 (08/29/21 1137)  Mobility Inpatient CMS G-Code Modifier : CL (08/29/21 1137)    Goals  Short term goals  Time Frame for Short term goals: 14 visits  Short term goal 1: Independent bed mobility  Short term goal 2: Independent transfers  Short term goal 3: Independent ambulation with least restricitve device 200 ft  Short term goal 4: Pt to navigate 12 stairs SBA  Short term goal 5: Pt to tolerate 30 minutes of activity to improve endurance  and safety with mobility.   Patient Goals   Patient goals : Get stronger       Therapy Time   Individual Concurrent Group Co-treatment   Time In 5344         Time Out 1125         Minutes 33         Timed Code Treatment Minutes: 30 [facility]  JOSE RAUL SHANNON Positive (10/01/2019),     BAR-Barbiturates Screen, Urin:  Negative (10/01/2019),     Buprenorphine:  Negative (10/01/2019),     BZO-Benzodiazepines Screen,Ur:  Negative (10/01/2019),     Cocaine(Metab.)Screen, Ur:  Negative (10/01/2019),     MDMA-Ecstasy:  Negative (10/01/2019),     Met-Methamphetamine:  Negative (10/01/2019),     MTD-Methadone Screen, Urine:  Negative (10/01/2019),     Opiate Screen, Urine:  Negative (10/01/2019),     OXY-Oxycodone:  Negative (10/01/2019),     PCP-Phencyclidine Screen, Uri:  Negative (10/01/2019),     THC Cannabinoids Screen, Urin:  Negative (10/01/2019),     Urine temperature:  confirmed (10/01/2019),     Date and time of last pill:  Adderall 10/01/2019 @ 0700/AS (10/01/2019),     Performed by:  tls (10/01/2019),     Collection Time:  1000 (10/01/2019),             Procedures:     Vaccination against other viral diseases, Influenza     1. Influenza, seasonal PF (children 3 years to adult): 0.5 ml unit dose given IM in the right upper arm; administered by AS;  lot number [id number]; expires 06/30/2020 Regarding contraindications to an Influenza vaccine: Denies moderate/severe illness with/without fever; serious reaction to eggs, egg proteins, gentamicin, gelatin, arginine, neomycin or polymixin; serious reaction after recieving previous influenza vaccines; and history of Guillain-Girdwood Syndrome.          Tdap vaccination     1. Tdap: 0.5 ml unit dose given IM in the left upper arm; administered by AS;  lot number [id number]; expires 10/01/2021             ASSESSMENT           V70.0   Z00.00  Health checkup              DDx:     314.00   F90.0  ADD              DDx:     V58.69   Z79.899  Use of high risk medications              DDx:     V79.0   Z13.89  Screening for depression              DDx:     V04.81   Z23  Vaccination against other viral diseases, Influenza              DDx:     V06.1   Z23  Tdap vaccination              DDx:         ORDERS:         Meds Prescribed:       Refill  of: Meloxicam 15mg Tablet 1 po qd wf prn  #45 (Forty Five) tablet(s) Refills: 1       Refill of: Prozac (Fluoxetine) 40mg Capsules Take 1 capsule(s) by mouth  #90 (Ninety) capsule(s) Refills: 3       Refill of: Metoprolol Succinate 25mg Tablets, Extended Release 1 tab PO daily  #90 (Ninety) tablet(s) Refills: 3         Radiology/Test Orders:       3017F  Colorectal CA screen results documented and reviewed (PV)  (In-House)           Lab Orders:       69725  Drug test prsmv read direct optical obs pr date  (In-House)         APPTO  Appointment need  (In-House)         87323  COMP [facility] Comp. Metabolic Panel  (Send-Out)         30011  DP [facility] Lipid Panel  (Send-Out)         *  PRSAS Medicare screening PSA  (Send-Out)           Procedures Ordered:       87029  Immunization administration; one vaccine  (In-House)         05294  Tetanus, diphtheria toxoid and acellular pertussis vaccine (TdaP),  for use in individuals seven yea  (In-House)         98508  Immunization administration; each additional vaccine/toxoid  (In-House)           Other Orders:       97979  Influenza virus vaccine, quadrivalent, split virus, preservative free 3 years of age & older  (In-House)           Depression screen positive and follow up plan documented  (In-House)                   PLAN:          Health checkup Herrera is here today for annual physical.  He is UTD on colonoscopy, last done 9/2018.  This had poor prep but was found to be normal.  Ten year repeat recommended.  He is due for prostate cancer screening; ANDREWS declined, PSA ordered.  He is UTD on Havrix #1 (9/2018),  Shingrix #1 (12/2018).  He is due for Shingrix #2, Havrix #2, Td, and flu.  Flu shot and Tdap given today.  He had an allergic reaction to the second Havrix.  He will get Shingrix #2 at pharmacy.  He is due for routine labs including HTN panel; ordered.  RTC 3 months.     LABORATORY:  Labs ordered to be performed today include Comprehensive metabolic panel,  lipid panel, and PSA.  [facility] PHQ-9 Depression Screening: Completed form scanned and in chart; Total Score 9 Positive Depression Screen: Stable on medications. No suicidal ideation.      FOLLOW-UP: Schedule a follow-up visit in 3 months.:.  f/u ADD with Neftaly           Orders:         Depression screen positive and follow up plan documented  (In-House)         3017F  Colorectal CA screen results documented and reviewed (PV)  (In-House)         APPTO  Appointment need  (In-House)         38152  [facility] Comp. Metabolic Panel  (Send-Out)         03369  DP [facility] Lipid Panel  (Send-Out)         *  PRSAS Medicare screening PSA  (Send-Out)            ADD Stable on current regimen.  Sx of concentration and focus are well controlled.  No adverse effects.  He does require ongoing use of this controlled substance to function.  Tox screen and Christopher run today.  No refills needed.  RTC 3 months.          Use of high risk medications     Controlled substance documentation: Christopher reviewed; drug screen performed and appropriate; consent is reviewed and signed and on the chart.  He is aware of risk of addiction on this medication, understands that he will need to follow up for a review every 3 months and his medications will be adjusted or decreased as deemed appropriate at each visit.  No history of drug or alcohol abuse.  No concerns about diversion or abuse. He denies side effects related to the medication.  He is aware that he may be called in for pill counts.  The dosing of this medication will be reviewed on a regular basis and reduced if possible..  Ongoing use of a controlled substance is necessary for this patient to have a normal quality of life           Orders:       17157  Drug test prsmv read direct optical obs pr date  (In-House)            Vaccination against other viral diseases, Influenza           Orders:       40074  Immunization administration; one vaccine  (In-House)         12186  Influenza  virus vaccine, quadrivalent, split virus, preservative free 3 years of age & older  (In-House)            Tdap vaccination         IMMUNIZATIONS given today: Boostrix (need ABN not covered by Medicare).            Orders:       87458  Tetanus, diphtheria toxoid and acellular pertussis vaccine (TdaP),  for use in individuals seven yea  (In-House)         35927  Immunization administration; each additional vaccine/toxoid  (In-House)               Other Prescriptions:       Refill of: Meloxicam 15mg Tablet 1 po qd wf prn  #45 (Forty Five) tablet(s) Refills: 1       Refill of: Prozac (Fluoxetine) 40mg Capsules Take 1 capsule(s) by mouth  #90 (Ninety) capsule(s) Refills: 3       Refill of: Metoprolol Succinate 25mg Tablets, Extended Release 1 tab PO daily  #90 (Ninety) tablet(s) Refills: 3         Patient Recommendations:        For  Health checkup:     Schedule a follow-up visit in 3 months.                APPOINTMENT INFORMATION:        Monday Tuesday Wednesday Thursday Friday Saturday Sunday            Time:___________________AM  PM   Date:_____________________             CHARGE CAPTURE           **Please note: ICD descriptions below are intended for billing purposes only and may not represent clinical diagnoses**        Primary Diagnosis:         V70.0 Health checkup            Z00.00    Encounter for general adult medical examination without abnormal findings              Orders:          36136   Preventive medicine, established patient, age 40-64 years  (In-House)                Depression screen positive and follow up plan documented  (In-House)             3017F   Colorectal CA screen results documented and reviewed (PV)  (In-House)             APPTO   Appointment need  (In-House)           314.00 ADD            F90.0    Attention-deficit hyperactivity disorder, predominantly inattentive type    V58.69 Use of high risk medications            Z79.899    Other long term (current) drug therapy               Orders:          24764   Drug test prsmv read direct optical obs pr date  (In-House)           V79.0 Screening for depression            Z13.89    Encounter for screening for other disorder    V04.81 Vaccination against other viral diseases, Influenza            Z23    Encounter for immunization              Orders:          80824   Immunization administration; one vaccine  (In-House)             05037   Influenza virus vaccine, quadrivalent, split virus, preservative free 3 years of age & older  (In-House)           V06.1 Tdap vaccination            Z23    Encounter for immunization              Orders:          46033   Tetanus, diphtheria toxoid and acellular pertussis vaccine (TdaP),  for use in individuals seven yea  (In-House)             51115   Immunization administration; each additional vaccine/toxoid  (In-House)

## 2021-08-29 NOTE — ANESTHESIA POSTPROCEDURE EVALUATION
Department of Anesthesiology  Postprocedure Note    Patient: Brittani Roldan  MRN: [id number]  YOB: 1945  Date of evaluation: 8/29/2021  Time:  4:07 AM     Procedure Summary     Date: 08/28/21 Room / Location: [address] [place] [address]    Anesthesia Start: 1108 Anesthesia Stop: 5363    Procedures:       SUB-XYPHOID WINDOW,PERICARDIAL DRAINAGE, STERNAL WIRE REMOVAL (N/A )      Procedure Not Yet Scheduled Diagnosis: (CARDIAC TAMPONADE)    Surgeons: Brad Le MD Responsible Provider: Xena Fraser MD    Anesthesia Type: general ASA Status: 4 - Emergent          Anesthesia Type: general    Nuno Phase I: Nuno Score: 3    Nuno Phase II:      Last vitals: Reviewed and per EMR flowsheets.        Anesthesia Post Evaluation    Patient location during evaluation: PACU  Patient participation: complete - patient participated  Level of consciousness: awake  Pain score: 1  Airway patency: patent  Nausea & Vomiting: no nausea and no vomiting  Complications: no  Cardiovascular status: blood pressure returned to baseline and hemodynamically stable  Respiratory status: acceptable  Hydration status: euvolemic  Comments:   Mechanical Ventilation Data  SETTINGS (Comprehensive)  Vent Information  $Ventilation: (S) Off Vent  Skin Assessment: Clean, dry, & intact  Vent Type: Servo i  Vent Mode: NIV/PC  Vt Ordered: 500 mL  Rate Set: 14 bmp  Pressure Support: 8 cmH20  FiO2 : 40 %  SpO2: 98 %  SpO2/FiO2 ratio: 250  Sensitivity: 5  PEEP/CPAP: 10  I Time/ I Time %: 0.9 s  Humidification Source: HME  Additional Respiratory  Assessments  Pulse: 76  Resp: 16  SpO2: 98 %  End Tidal CO2: 41 (%)  Humidification Source: HME  Oral Care: Teeth brushed, Mouthwash    ABG   No results found for: PH, PCO2, PO2, HCO3, O2SAT  Lab Results       Component                Value               Date                       MODE                     CPAP/PS             08/28/2021

## 2021-08-29 NOTE — PLAN OF CARE
Problem: SAFETY  Goal: Free from accidental physical injury  8/29/2021 1832 by Sapphire Parnell RN  Outcome: Ongoing     Problem: PAIN  Goal: Patient's pain/discomfort is manageable  8/29/2021 1832 by Sapphire Parnell RN  Outcome: Ongoing     Problem: Pain:  Goal: Pain level will decrease  Description: Pain level will decrease  8/29/2021 1832 by Sapphire Parnell RN  Outcome: Ongoing     Problem: Pain:  Goal: Control of acute pain  Description: Control of acute pain  8/29/2021 1832 by Sapphire Parnell RN  Outcome: Ongoing     Problem: Pain:  Goal: Control of chronic pain  Description: Control of chronic pain  8/29/2021 1832 by Sapphire Parnell RN  Outcome: Ongoing     Problem: Skin Integrity:  Goal: Will show no infection signs and symptoms  Description: Will show no infection signs and symptoms  8/29/2021 1832 by Sapphire Parnell RN  Outcome: Ongoing     Problem: Skin Integrity:  Goal: Absence of new skin breakdown  Description: Absence of new skin breakdown  8/29/2021 1832 by Sapphire Parnell RN  Outcome: Ongoing     Problem: Falls - Risk of:  Goal: Will remain free from falls  Description: Will remain free from falls  8/29/2021 1832 by Sapphire Parnell RN  Outcome: Ongoing     Problem: Falls - Risk of:  Goal: Absence of physical injury  Description: Absence of physical injury  8/29/2021 1832 by Sapphire Parnell RN  Outcome: Ongoing     Problem: OXYGENATION/RESPIRATORY FUNCTION  Goal: Patient will maintain patent airway  8/29/2021 1832 by Sapphire Parnell RN  Outcome: Ongoing     Problem: OXYGENATION/RESPIRATORY FUNCTION  Goal: Patient will achieve/maintain normal respiratory rate/effort  Description: Respiratory rate and effort will be within normal limits for the patient  8/29/2021 1832 by Sapphire Parnell RN  Outcome: Ongoing     Problem: SKIN INTEGRITY  Goal: Skin integrity is maintained or improved  8/29/2021 1832 by Sapphire Parnell RN  Outcome: Ongoing

## 2021-08-29 NOTE — PROGRESS NOTES
Writer contacted Kenneth Leyden, NP, regarding patient's increased need for supplemental oxygen. Crackles are heard upon auscultation. New order for one time dose of 40mg IV lasix.

## 2021-08-30 ENCOUNTER — APPOINTMENT (OUTPATIENT)
Dept: GENERAL RADIOLOGY | Age: 76
DRG: 270 | End: 2021-08-30
Payer: MEDICARE

## 2021-08-30 LAB
ANION GAP SERPL CALCULATED.3IONS-SCNC: 11 MMOL/L (ref 9–17)
BUN BLDV-MCNC: 28 MG/DL (ref 8–23)
BUN/CREAT BLD: ABNORMAL (ref 9–20)
CALCIUM SERPL-MCNC: 8.4 MG/DL (ref 8.6–10.4)
CHLORIDE BLD-SCNC: 100 MMOL/L (ref 98–107)
CO2: 25 MMOL/L (ref 20–31)
CREAT SERPL-MCNC: 1.07 MG/DL (ref 0.5–0.9)
EKG ATRIAL RATE: 100 BPM
EKG P AXIS: 67 DEGREES
EKG P-R INTERVAL: 142 MS
EKG Q-T INTERVAL: 366 MS
EKG QRS DURATION: 62 MS
EKG QTC CALCULATION (BAZETT): 472 MS
EKG R AXIS: 75 DEGREES
EKG T AXIS: 80 DEGREES
EKG VENTRICULAR RATE: 100 BPM
GFR AFRICAN AMERICAN: >60 ML/MIN
GFR NON-AFRICAN AMERICAN: 50 ML/MIN
GFR SERPL CREATININE-BSD FRML MDRD: ABNORMAL ML/MIN/{1.73_M2}
GFR SERPL CREATININE-BSD FRML MDRD: ABNORMAL ML/MIN/{1.73_M2}
GLUCOSE BLD-MCNC: 118 MG/DL (ref 70–99)
HCT VFR BLD CALC: 26.2 % (ref 36.3–47.1)
HEMOGLOBIN: 8.2 G/DL (ref 11.9–15.1)
INR BLD: 1
MAGNESIUM: 2.5 MG/DL (ref 1.6–2.6)
MCH RBC QN AUTO: 30.9 PG (ref 25.2–33.5)
MCHC RBC AUTO-ENTMCNC: 31.3 G/DL (ref 28.4–34.8)
MCV RBC AUTO: 98.9 FL (ref 82.6–102.9)
NRBC AUTOMATED: 0 PER 100 WBC
PDW BLD-RTO: 13.5 % (ref 11.8–14.4)
PLATELET # BLD: 187 K/UL (ref 138–453)
PMV BLD AUTO: 12 FL (ref 8.1–13.5)
POC ANGLE TEG W HEP: 78 DEG (ref 59–74)
POC ANGLE TEG: 79.3 DEG (ref 59–74)
POC EPL TEG W/HEP: ABNORMAL % (ref 0–15)
POC EPL TEG: ABNORMAL % (ref 0–15)
POC KINETICS TEG W HEP: 0.8 MIN (ref 1–3)
POC KINETICS TEG: 0.8 MIN (ref 1–3)
POC LY30(LYSIS) TEG W HEP: ABNORMAL % (ref 0–8)
POC LY30(LYSIS) TEG: ABNORMAL % (ref 0–8)
POC MA(MAX CLOT) TEG: 78.2 MM (ref 55–74)
POC MAX CLOT TEG W HEP: 77.4 MM (ref 55–74)
POC REACTION TIME TEG W HEP: 2.8 MIN (ref 4–9)
POC REACTION TIME TEG: 2.7 MIN (ref 4–9)
POTASSIUM SERPL-SCNC: 5.1 MMOL/L (ref 3.7–5.3)
PROTHROMBIN TIME: 10.4 SEC (ref 9.1–12.3)
RBC # BLD: 2.65 M/UL (ref 3.95–5.11)
SODIUM BLD-SCNC: 136 MMOL/L (ref 135–144)
TEG COMMENT: ABNORMAL
TEG COMMENT: ABNORMAL
WBC # BLD: 16 K/UL (ref 3.5–11.3)

## 2021-08-30 PROCEDURE — 99024 POSTOP FOLLOW-UP VISIT: CPT | Performed by: NURSE PRACTITIONER

## 2021-08-30 PROCEDURE — 94640 AIRWAY INHALATION TREATMENT: CPT

## 2021-08-30 PROCEDURE — 36415 COLL VENOUS BLD VENIPUNCTURE: CPT

## 2021-08-30 PROCEDURE — 97166 OT EVAL MOD COMPLEX 45 MIN: CPT

## 2021-08-30 PROCEDURE — 80048 BASIC METABOLIC PNL TOTAL CA: CPT

## 2021-08-30 PROCEDURE — 2580000003 HC RX 258: Performed by: NURSE PRACTITIONER

## 2021-08-30 PROCEDURE — 2700000000 HC OXYGEN THERAPY PER DAY

## 2021-08-30 PROCEDURE — 6370000000 HC RX 637 (ALT 250 FOR IP): Performed by: NURSE PRACTITIONER

## 2021-08-30 PROCEDURE — 97535 SELF CARE MNGMENT TRAINING: CPT

## 2021-08-30 PROCEDURE — 85027 COMPLETE CBC AUTOMATED: CPT

## 2021-08-30 PROCEDURE — 93325 DOPPLER ECHO COLOR FLOW MAPG: CPT

## 2021-08-30 PROCEDURE — 83735 ASSAY OF MAGNESIUM: CPT

## 2021-08-30 PROCEDURE — 71045 X-RAY EXAM CHEST 1 VIEW: CPT

## 2021-08-30 PROCEDURE — 93010 ELECTROCARDIOGRAM REPORT: CPT | Performed by: INTERNAL MEDICINE

## 2021-08-30 PROCEDURE — 6360000002 HC RX W HCPCS: Performed by: STUDENT IN AN ORGANIZED HEALTH CARE EDUCATION/TRAINING PROGRAM

## 2021-08-30 PROCEDURE — 2100000001 HC CVICU R&B

## 2021-08-30 PROCEDURE — 93005 ELECTROCARDIOGRAM TRACING: CPT | Performed by: THORACIC SURGERY (CARDIOTHORACIC VASCULAR SURGERY)

## 2021-08-30 PROCEDURE — P9041 ALBUMIN (HUMAN),5%, 50ML: HCPCS | Performed by: STUDENT IN AN ORGANIZED HEALTH CARE EDUCATION/TRAINING PROGRAM

## 2021-08-30 PROCEDURE — 85610 PROTHROMBIN TIME: CPT

## 2021-08-30 PROCEDURE — 6370000000 HC RX 637 (ALT 250 FOR IP): Performed by: STUDENT IN AN ORGANIZED HEALTH CARE EDUCATION/TRAINING PROGRAM

## 2021-08-30 PROCEDURE — APPSS45 APP SPLIT SHARED TIME 31-45 MINUTES: Performed by: NURSE PRACTITIONER

## 2021-08-30 PROCEDURE — 2500000003 HC RX 250 WO HCPCS: Performed by: NURSE PRACTITIONER

## 2021-08-30 PROCEDURE — 94761 N-INVAS EAR/PLS OXIMETRY MLT: CPT

## 2021-08-30 PROCEDURE — 93308 TTE F-UP OR LMTD: CPT

## 2021-08-30 PROCEDURE — 99233 SBSQ HOSP IP/OBS HIGH 50: CPT | Performed by: INTERNAL MEDICINE

## 2021-08-30 RX ORDER — ALBUMIN, HUMAN INJ 5% 5 %
25 SOLUTION INTRAVENOUS ONCE
Status: COMPLETED | OUTPATIENT
Start: 2021-08-30 | End: 2021-08-30

## 2021-08-30 RX ORDER — NOREPINEPHRINE BIT/0.9 % NACL 16MG/250ML
2-30 INFUSION BOTTLE (ML) INTRAVENOUS CONTINUOUS
Status: DISCONTINUED | OUTPATIENT
Start: 2021-08-30 | End: 2021-08-31

## 2021-08-30 RX ORDER — ALBUMIN (HUMAN) 12.5 G/50ML
25 SOLUTION INTRAVENOUS ONCE
Status: DISCONTINUED | OUTPATIENT
Start: 2021-08-30 | End: 2021-08-30

## 2021-08-30 RX ADMIN — IPRATROPIUM BROMIDE AND ALBUTEROL SULFATE 1 AMPULE: .5; 2.5 SOLUTION RESPIRATORY (INHALATION) at 20:15

## 2021-08-30 RX ADMIN — ATORVASTATIN CALCIUM 20 MG: 20 TABLET, FILM COATED ORAL at 21:17

## 2021-08-30 RX ADMIN — MUPIROCIN: 20 OINTMENT TOPICAL at 09:50

## 2021-08-30 RX ADMIN — ALBUMIN (HUMAN) 25 G: 12.5 INJECTION, SOLUTION INTRAVENOUS at 04:51

## 2021-08-30 RX ADMIN — ASPIRIN 81 MG: 81 TABLET, COATED ORAL at 09:50

## 2021-08-30 RX ADMIN — AMIODARONE HYDROCHLORIDE 200 MG: 200 TABLET ORAL at 13:37

## 2021-08-30 RX ADMIN — OXYCODONE HYDROCHLORIDE AND ACETAMINOPHEN 1 TABLET: 5; 325 TABLET ORAL at 17:04

## 2021-08-30 RX ADMIN — PANTOPRAZOLE SODIUM 40 MG: 40 TABLET, DELAYED RELEASE ORAL at 09:50

## 2021-08-30 RX ADMIN — IPRATROPIUM BROMIDE AND ALBUTEROL SULFATE 1 AMPULE: .5; 2.5 SOLUTION RESPIRATORY (INHALATION) at 11:44

## 2021-08-30 RX ADMIN — OXYCODONE HYDROCHLORIDE AND ACETAMINOPHEN 1 TABLET: 5; 325 TABLET ORAL at 20:52

## 2021-08-30 RX ADMIN — TICAGRELOR 90 MG: 90 TABLET ORAL at 21:17

## 2021-08-30 RX ADMIN — AMIODARONE HYDROCHLORIDE 200 MG: 200 TABLET ORAL at 21:17

## 2021-08-30 RX ADMIN — ACETAMINOPHEN 650 MG: 325 TABLET ORAL at 05:56

## 2021-08-30 RX ADMIN — SODIUM CHLORIDE, PRESERVATIVE FREE 10 ML: 5 INJECTION INTRAVENOUS at 09:50

## 2021-08-30 RX ADMIN — IPRATROPIUM BROMIDE AND ALBUTEROL SULFATE 1 AMPULE: .5; 2.5 SOLUTION RESPIRATORY (INHALATION) at 16:24

## 2021-08-30 RX ADMIN — IPRATROPIUM BROMIDE AND ALBUTEROL SULFATE 1 AMPULE: .5; 2.5 SOLUTION RESPIRATORY (INHALATION) at 08:21

## 2021-08-30 RX ADMIN — POLYETHYLENE GLYCOL 3350 17 G: 17 POWDER, FOR SOLUTION ORAL at 09:50

## 2021-08-30 RX ADMIN — MUPIROCIN: 20 OINTMENT TOPICAL at 20:15

## 2021-08-30 RX ADMIN — TICAGRELOR 90 MG: 90 TABLET ORAL at 09:50

## 2021-08-30 RX ADMIN — AMIODARONE HYDROCHLORIDE 200 MG: 200 TABLET ORAL at 09:50

## 2021-08-30 RX ADMIN — Medication 0.04 MCG/KG/MIN: at 00:22

## 2021-08-30 RX ADMIN — SODIUM CHLORIDE, PRESERVATIVE FREE 10 ML: 5 INJECTION INTRAVENOUS at 21:17

## 2021-08-30 ASSESSMENT — PAIN SCALES - GENERAL
PAINLEVEL_OUTOF10: 1
PAINLEVEL_OUTOF10: 4
PAINLEVEL_OUTOF10: 0
PAINLEVEL_OUTOF10: 0
PAINLEVEL_OUTOF10: 5
PAINLEVEL_OUTOF10: 0
PAINLEVEL_OUTOF10: 4
PAINLEVEL_OUTOF10: 0

## 2021-08-30 ASSESSMENT — PAIN DESCRIPTION - PAIN TYPE
TYPE: SURGICAL PAIN
TYPE: ACUTE PAIN

## 2021-08-30 ASSESSMENT — PAIN DESCRIPTION - LOCATION
LOCATION: INCISION
LOCATION: BACK

## 2021-08-30 NOTE — PROGRESS NOTES
Occupational Therapy   Occupational Therapy Initial Assessment  Date: 2021   Patient Name: Wale Adrian  MRN: [id number]     : 1945    Date of Service: 2021  Chief Complaint   Patient presents with    Chest Pain   S/P: emergent pericardial window    Discharge Recommendations:  Patient would benefit from continued therapy after discharge; Patient is currently unsafe to return to prior living environment at this time d/t functional deficits impacting patient's performance and safety with ADLs and functional tasks. OT Equipment Recommendations  Equipment Needed: Yes  Mobility Devices: ADL Assistive Devices  ADL Assistive Devices: Transfer Tub Bench;Reacher    Assessment   Performance deficits / Impairments: Decreased functional mobility ; Decreased safe awareness;Decreased balance;Decreased ADL status; Decreased endurance;Decreased high-level IADLs;Decreased strength  Assessment: Patient presents lethargic in supine upon arrival, completed bed mobility at [facility] to sit EOB at [facility]. Pt reported dizziness, O2 desating to 87%, bumped to 4L NC for activity; Pt completed functional transfer at [address] [place] [address] and completed functional mobility at [facility] from EOB to bedside recliner d/t fatigue and CVP monitor remaining on screen. Pt engaged in grooming tasks sitting unsupported in recliner and retired to engage in self-feeding independently. Pt demonstrates decreased endurance, balance and general strength impacting safety and performance in functional tasks. Patient would benefit from continued acute OT services to address functional deficits through skilled intervention of ADL and IADL compensatory training, balance, safety and transfer training, education of EC/WS techs and implementation, use of AE/DME to increase independence, and strengthening activities to improve function for ADLs to promote functional outcomes.    Prognosis: Good  Decision Making: Medium Complexity  Patient Education: OT role, OT POC, purpose of evaluation, importance of OOB activity, hand placement for transfers, safe use of RW, sequencing for bed mobility - good/fair return  REQUIRES OT FOLLOW UP: Yes  Activity Tolerance  Activity Tolerance: Patient limited by fatigue;Patient Tolerated treatment well  Safety Devices  Safety Devices in place: Yes  Type of devices: Gait belt;Call light within reach;Nurse notified; Left in chair;Chair alarm in place  Restraints  Initially in place: No         Patient Diagnosis(es): The primary encounter diagnosis was Chest pain, unspecified type. A diagnosis of NSTEMI (non-ST elevated myocardial infarction) [facility]) was also pertinent to this visit. has a past medical history of Atherosclerosis, CAD (coronary artery disease), Hydatidiform mole, and Rosacea. has a past surgical history that includes Carotid endarterectomy (age 44); Coronary artery bypass graft (age 45); Tonsillectomy and adenoidectomy (age 8); Dilation & curettage (1971); and Pericardium surgery (N/A, 8/28/2021). Restrictions  Restrictions/Precautions  Restrictions/Precautions: Up as Tolerated, Surgical Protocols, Cardiac, Fall Risk, Sternal precautions (<5lbs lifting)   Required Braces or Orthoses?: No  Position Activity Restriction  Other position/activity restrictions: ambulate patient    Subjective   General  Patient assessed for rehabilitation services?: Yes  Family / Caregiver Present: No  General Comment  Comments: RN ok'd patient for OT evaluation. Pt pleasant and cooperative throughout. Patient Currently in Pain: Yes  Pain Assessment  Pain Level: 1  Pain Type: Acute pain  Pain Location: Back  Non-Pharmaceutical Pain Intervention(s): Ambulation/Increased Activity;Repositioned; Therapeutic presence  Response to Pain Intervention: Patient Satisfied  Vital Signs  Pulse: 109  Resp: 14  BP: (!) 84/58  MAP (mmHg): 66  Patient Currently in Pain: Yes  Oxygen Therapy  SpO2: 99 %    Social/Functional History  Social/Functional History  Lives With: Spouse  Type of Home: House  Home Layout: Two level, 1/2 bath on main level, Bed/Bath upstairs (Pt sleeps on sofa with dog.)  Home Access: Stairs to enter with rails  Entrance Stairs - Number of Steps: 2  Bathroom Shower/Tub: Tub/Shower unit, Walk-in shower  Bathroom Toilet: Standard  Bathroom Equipment: Grab bars in [address] [place]: Rolling walker, Cane  ADL Assistance: Independent  Homemaking Assistance: Independent  Homemaking Responsibilities: Yes  Meal Prep Responsibility: Primary  Laundry Responsibility: Primary  Cleaning Responsibility: Primary  Shopping Responsibility: Primary  Ambulation Assistance: Independent  Transfer Assistance: Independent  Active : Yes  Occupation: Retired  Type of occupation: teacher  Leisure & Hobbies: Active. Walks dog. Objective   Vision: Impaired  Vision Exceptions: Wears glasses at all times  Hearing: Within functional limits          Balance  Sitting Balance: Contact guard assistance (EOB and unsupported in recliner)  Standing Balance: Contact guard assistance  Standing Balance  Time: ~3-4 min  Activity: standing EOB  Functional Mobility  Functional - Mobility Device: Rolling Walker  Activity: Other  Assist Level: Contact guard assistance  Functional Mobility Comments: from EOB to bedside recliner  ADL  Feeding: Independent (OT facilitated self feeding unsupported in recliner)  Grooming: Modified independent ; Increased time to complete (OT facilitated washing face, hair and combing hair sitting unsupported in recliner maintaining precautions)  UE Bathing: Contact guard assistance; Increased time to complete  LE Bathing: Minimal assistance; Increased time to complete  UE Dressing: Contact guard assistance; Increased time to complete  LE Dressing: Moderate assistance; Increased time to complete  Toileting: Minimal assistance; Increased time to complete  Tone RUE  RUE Tone: Normotonic  Tone LUE  LUE Tone: Normotonic  Coordination  Movements Are Fluid And Coordinated: Yes     Bed mobility  Supine to Sit: Moderate assistance  Scooting: Minimal assistance  Comment: Retired to bedside recliner at end of session  Transfers  Sit to stand: Minimal assistance  Stand to sit: Minimal assistance  Transfer Comments: VCs for hand placement     Cognition  Overall Cognitive Status: WFL        Sensation  Overall Sensation Status: WFL (denies any numbness or tingling)      LUE AROM : WFL  Left Hand AROM: WFL  RUE AROM : WFL  Right Hand AROM: WFL  LUE Strength  Gross LUE Strength: Exceptions to [facility]  L Hand General: 4/5  LUE Strength Comment: gross 4/5  RUE Strength  Gross RUE Strength: Exceptions to [facility]  R Hand General: 4/5  RUE Strength Comment: gross 4/5     Plan   Plan  Times per week: 4-6x/wk  Current Treatment Recommendations: Strengthening, Endurance Training, Patient/Caregiver Education & Training, Equipment Evaluation, Education, & procurement, Self-Care / ADL, Home Management Training, Safety Education & Training, Functional Mobility Training, Balance Training    AM-PAC Score        AM-[facility] Inpatient Daily Activity Raw Score: 20 (08/30/21 1448)  -PAC Inpatient ADL T-Scale Score : 42.03 (08/30/21 1448)  ADL Inpatient CMS 0-100% Score: 38.32 (08/30/21 1448)  ADL Inpatient CMS G-Code Modifier : Jose Alberto Cano (08/30/21 1448)    Goals  Short term goals  Time Frame for Short term goals: Patient will, by discharge  Short term goal 1: demo UB ADLs at . [facility]  Short term goal 2: demo LB ADLs at supervision using AE PRN  Short term goal 3: demo functional transfers/mobility using LRD at Supervision to engage in ADLs safely  Short term goal 4: demo 10+ min of dynamic standing tolerance reaching outside BHAVESH to engage in ADLs safely  Short term goal 5: demo 100% adherence to precautions during functional tasks with 0 cues     Therapy Time   Individual Concurrent Group Co-treatment   Time In 0838         Time Out 0912         Minutes 34         Timed Code Treatment Minutes: 32 Minutes     Srinivasa Rojas OTR/L

## 2021-08-30 NOTE — PROGRESS NOTES
2200- Writer contacted internal med on call, regarding low BP of 86/50 (62). Pt resting comfortably in bed. Urine output has also been low, 55cc since 1900. EF: 35-40%. 65- Dr. Edmar Winkler at bedside to assess pt. CVP noted to be 4-7. Dr. Edmar Winkler suggests 250cc fluid bolus to see how pt responds, but to clarify with cardiology before moving forward. 200- Writer contacted Dr. Jose Maradiaga regarding pt status. Writer explained low urine output, low blood BP, CVP of 4-7, EF of 35-40%, and internal med recommendations. Dr. Jose Maradiaga states not to give fluids and pt will be assessed in the morning. 26- Dr. Jose Maradiaga updated on down trending BP. BP is now 81/49(59). Dr. Jose Maradiaga states to start levophed to keep MAP >60. Internal med updated on cardiology recommendation. 18- Writer asked for clarification from Dr. Jose Maradiaga regarding starting vasopressor versus fluid bolus with CVP of 2-4, low urine output, and internal medicine recommendation of fluid bolus. New order for 250cc fluid bolus received. Per Dr. Jose Maradiaga, if fluid does not improve MAP to 60 or greater, levophed to be started.

## 2021-08-30 NOTE — PROGRESS NOTES
18- Writer spoke with Dr. Brad Holter regarding pt fluid status. New order for STAT chest xray. Radiology contacted. [address] [facility] spoke with Dr. Brad Holter regarding chest xray results. New order for 500ml Albumin.

## 2021-08-30 NOTE — FLOWSHEET NOTE
Pt's [facility] ([address] [facility]) notified of admission, as per referral from Fr. Saira Garcia. Chaplains will remain available to offer spiritual and emotional support as needed.

## 2021-08-30 NOTE — CARE COORDINATION
Met with patient to discuss transitional planning. Patient relates she does not feel strong enough to go home, would like to go somewhere for rehab. She'd like referral to [facility], but wants me to inquire on covid cases. Referral faxed    [phone] received call from [facility] at [facility], they can accept patient. [facility] relates they do not have any active Covid cases, will need covid swab before DC.   Patient and  updated, agreeable with plan

## 2021-08-30 NOTE — PROGRESS NOTES
[address] [facility]  Daily Progress Note    Surgeon: Dr. Macho White   S/P :  Emergent Pericardial Window   POD#: 2  EF: 35-40%     Subjective:  Ms. Paulina Del Cid feels well today with no acute complaints. Pain is controlled on current medication regimen. Denies chest pain or shortness of breath. Physical Exam  Vital Signs: BP (!) 99/58   Pulse 87   Temp 98.4 °F (36.9 °C) (Axillary)   Resp 15   Ht 5' 1\" (1.549 m)   Wt 115 lb 8 oz (52.4 kg)   SpO2 99%   BMI 21.82 kg/m²  O2 Flow Rate (L/min): 3 L/min   Admit Weight: Weight: 112 lb (50.8 kg)   WEIGHTWeight: 115 lb 8 oz (52.4 kg)     General: alert and oriented to person, place and time, well-developed and well-nourished, in no acute distress. Up in chair  Heart: Normal S1 and S2.  Regular rhythm. No murmurs, gallops, or rubs. Pacing Wires: No. Pericardial Drain: Yes- to be removed today  Lungs: clear to auscultation bilaterally and diminished breath sounds bibasilar Chest tubes: No  Abdomen: soft, non tender, non distended, BSx4  Extremities: Trace edema  Wounds: clean and dry, healing appropriately.      Scheduled Meds:    furosemide  40 mg IntraVENous BID    ticagrelor  90 mg Oral BID    [Held by provider] carvedilol  3.125 mg Oral BID WC    sodium chloride flush  10 mL IntraVENous 2 times per day    aspirin  81 mg Oral Daily    amiodarone  200 mg Oral TID    mupirocin   Nasal BID    polyethylene glycol  17 g Oral Daily    atorvastatin  20 mg Oral Nightly    pantoprazole  40 mg Oral Daily    ipratropium-albuterol  1 ampule Inhalation Q4H WA    insulin glargine  0.15 Units/kg Subcutaneous Nightly     Continuous Infusions:    norepinephrine Stopped (08/30/21 0450)    dextrose         Data:  CBC:   Recent Labs     08/28/21  1352 08/28/21  1352 08/29/21  0501 08/29/21  1637 08/30/21  0455   WBC 19.7*  --  16.9*  --  16.0*   HGB 9.5*   < > 9.4* 9.4* 8.2*   HCT 29.2*   < > 29.4* 29.8* 26.2*   MCV 97.0  --  97.4  --  98.9     -- 217  --  187    < > = values in this interval not displayed. BMP:   Recent Labs     08/29/21  0501 08/29/21  1210 08/30/21  0455    136 136   K 3.8 5.1 5.1   CL 99 99 100   CO2 24 27 25   BUN 19 20 28*   CREATININE 0.71 0.89 1.07*     PT/INR:   Recent Labs     08/28/21  1352 08/29/21  0501 08/30/21  0455   PROTIME 11.2 10.7 10.4   INR 1.1 1.0 1.0     APTT:   Recent Labs     08/27/21  1642 08/28/21  1105 08/28/21  1352   APTT 24.2 20.4* 21.7       Chest X-Ray:   FINDINGS:   Unchanged left subclavian central venous catheter.  Changes of median   sternotomy and coronary artery stent grafting.       Patient rotation to the left.  No significant change in bilateral perihilar   and basilar airspace opacities remaining most prominent in the left base. Persistent diffuse interstitial prominence with indistinct pulmonary   vasculature and interlobular septal thickening.  No definite findings of   pneumothorax.  Trace to small bilateral pleural effusions.  Prominence of the   pulmonary trunk, a finding that can be seen pulmonary hypertension.  Cardiac   contour within normal limits given technique and degree of rotation. I/O:  I/O last 3 completed shifts: In: [address] [P.O.:860; I.V.:975]  Out: 1060 [Urine:1005; Drains:20; Chest Tube:35]    Assessment/Plan:      Diagnosis Date    Atherosclerosis     requiring carotid endarterectomy    CAD (coronary artery disease)     Hydatidiform mole     no chemo or radiation    Rosacea       Neuro: Intact   Lungs: Clear, diminished in the bases   HD: VSS- improved after albumin administration. Coreg held per cardiology    Edema: trace peripheral   GI: Active bowel sounds X4   : Good urine output- after albumin administration   Oxygen as needed via nasal cannula to maintain SpO2 > 92%  o Wean as tolerated   Remove pericardial drain today   Encourage incentive spirometry, acapella and ambulation    Once drain removed- CT surgery will sign off.     The above recommendations including medications and orders were discussed and agreed upon with Dr. Kayce Lion, the attending on service for the cardiothoracic surgery group today. Electronically signed by ZACH Camp CNP on 8/30/2021 at 7:14 AM    On this date 8/30/2021 I have spent 35 minutes reviewing previous notes, test results and face to face with the patient discussing the diagnosis and importance of compliance with the treatment plan as well as documenting on the day of the visit. At least 50% of the time documented was spent with the patient to provide counseling and/or coordination of care. This note was created with the assistance of a speech-recognition program.  Although the intention is to generate a document that actually reflects the content of the visit, no guarantees can be provided that every mistake has been identified and corrected by editing. Note was updated later by me after  physical examination and  completion of the assessment.

## 2021-08-30 NOTE — PLAN OF CARE
Problem: SAFETY  Goal: Free from accidental physical injury  8/30/2021 0413 by Reina Kovacs RN  Outcome: Ongoing  8/29/2021 1832 by Tim Jensen RN  Outcome: Ongoing     Problem: PAIN  Goal: Patient's pain/discomfort is manageable  8/30/2021 0413 by Reina Kovacs RN  Outcome: Ongoing  8/29/2021 1832 by Tim Jensen RN  Outcome: Ongoing     Problem: Pain:  Goal: Pain level will decrease  Description: Pain level will decrease  8/30/2021 0413 by Reina Kovacs RN  Outcome: Ongoing  8/29/2021 1832 by Tim Jensen RN  Outcome: Ongoing  Goal: Control of acute pain  Description: Control of acute pain  8/30/2021 0413 by Reina Kovacs RN  Outcome: Ongoing  8/29/2021 1832 by Tim Jensen RN  Outcome: Ongoing  Goal: Control of chronic pain  Description: Control of chronic pain  8/30/2021 0413 by Reina Kovacs RN  Outcome: Ongoing  8/29/2021 1832 by Tim Jensen RN  Outcome: Ongoing     Problem: Skin Integrity:  Goal: Will show no infection signs and symptoms  Description: Will show no infection signs and symptoms  8/30/2021 0413 by Reina Kovacs RN  Outcome: Ongoing  8/29/2021 1832 by Tim Jensen RN  Outcome: Ongoing  Goal: Absence of new skin breakdown  Description: Absence of new skin breakdown  8/30/2021 0413 by Reina Kovacs RN  Outcome: Ongoing  8/29/2021 1832 by Tim Jensen RN  Outcome: Ongoing     Problem: Falls - Risk of:  Goal: Will remain free from falls  Description: Will remain free from falls  8/30/2021 0413 by Reina Kovacs RN  Outcome: Ongoing  8/29/2021 1832 by Tim Jensen RN  Outcome: Ongoing  Goal: Absence of physical injury  Description: Absence of physical injury  8/30/2021 0413 by Reina Kovacs RN  Outcome: Ongoing  8/29/2021 1832 by Tim Jensen RN  Outcome: Ongoing     Problem: OXYGENATION/RESPIRATORY FUNCTION  Goal: Patient will maintain patent airway  8/30/2021 0413 by Reina Kovasc RN  Outcome: Ongoing  8/29/2021 1832 by Dinh Andrade CHRIS Benson  Outcome: Ongoing  Goal: Patient will achieve/maintain normal respiratory rate/effort  Description: Respiratory rate and effort will be within normal limits for the patient  8/30/2021 0413 by Dian Dinero RN  Outcome: Ongoing  8/29/2021 1832 by Milly Blanchard RN  Outcome: Ongoing     Problem: SKIN INTEGRITY  Goal: Skin integrity is maintained or improved  8/30/2021 0413 by Dian Dinero RN  Outcome: Ongoing  8/29/2021 1832 by Milly Blanchard RN  Outcome: Ongoing

## 2021-08-30 NOTE — OP NOTE
[address] [address]                                OPERATIVE REPORT    PATIENT NAME: Noam Baer                 :        1945  MED REC NO:   [id number]                             ROOM:       5973  ACCOUNT NO:   [de-identified]                           ADMIT DATE: 2021  PROVIDER:     July Saez MD    DATE OF PROCEDURE:  2021    PRIMARY ATTENDING SURGEON:  July Saez MD    OTHER ASSISTANT:  Included Yoselin Mccracken RN, RFA    PREOPERATIVE DIAGNOSES:  Cardiac tamponade physiology, large pericardial  effusion status post LAD laceration in cath lab. POSTOPERATIVE DIAGNOSES:  Cardiac tamponade physiology, large  pericardial effusion status post LAD laceration in cath lab. PROCEDURES:  Emergent subxiphoid exploration (in the face of redo  sternotomy), extensive lysis of adhesions and drainage of  circumferential pericardial effusion and evacuation of hematoma,  pericardial drain placement with a 24-Kazakh Vaughn. COMPLICATIONS:  None. CONDITION:  Stable. DISPOSITION:  To CVICU. ESTIMATED BLOOD LOSS:  Not applicable. ANESTHESIA:  General endotracheal by Dr. Chaz Perea. INDICATIONS FOR SURGERY:  The patient is a 64-year-old woman who  undergone a coronary artery bypass graft more than 20 years prior, who  presented to the Cardiology team several days prior to this operative  date and underwent catheterization for what was felt to be recurrent  coronary artery disease. During this time, her LAD was lacerated and  several covered stents were required to control the bleeding. She  initially had done well; however, presented the morning of 2021  with heart rate of 150s and hypotension and was short of breath and with  confusion.   A stat echocardiogram was performed and she was found to  have a large circumferential pericardial effusion which was squeezing on  the left ventricle and believed to be causing left ventricular tamponade  physiology. Dr. Melanie Alexander from the Cardiology team consulted me emergently  and requested that I perform a pericardial drainage. Unfortunately, she  had been loaded with _____ the day before and Plavix the day before that  secondary to the multiple covered stents being placed and her rising  troponins. That made this an extremely high risk venture especially in  the reoperative setting of her previous sternotomy. I discussed all  these with Dr. Melanie Alexander but we came to the conclusion that there was no way  to avoid the reoperative approach here on this day given the  echocardiographic appearance and her clinical exam.  I thus took her to  Surgery with the consent of she and her family for emergency redo  subxiphoid exploration and evacuation of hematoma and drain placement on  08/28/2021. FINDINGS AT SURGERY:  The heart was densely adhered to the pericardium  and a subxiphoid approach to the heart was difficult. An extensive  lysis of adhesions was performed and ultimately, I was able to get into  the pocket of hematoma surrounding the anterior and lateral surface of  the left ventricle and there was indeed old blood, which was evacuated  for several 100 mL. Her heart rate came down and her hemodynamics  immediately improved. I left the 24-Dutch Vaughn drain for ongoing  drainage. SURGERY IN DETAIL:  The patient was identified in her bed in the CVICU  and was transported emergently to the operating room, where she was  induced general endotracheal anesthesia without difficulty. This  included an uneventful endotracheal intubation and the appropriate  placement of lines and access for cardiac surgery. She was prepped and  draped in the normal sterile fashion, and a time-out was performed and  documented. The operation began with the performance of a subxiphoid  incision, and I removed the lower most sternal wire.   I then opened

## 2021-08-30 NOTE — PLAN OF CARE
Patient's pericardial drain has been removed. Will sign off of the case at this time. Contact CTS for any additional needs.

## 2021-08-30 NOTE — PROGRESS NOTES
[address] Dial updated Dr. Aliza Gray regarding pt BP. Fluid bolus helped but did not last. 30cc urine out since bolus was administered. Levophed started, see MAR.

## 2021-08-30 NOTE — PROGRESS NOTES
[facility]   Progress Note                    Date:   8/30/2021  Patient name:  Binh Pickard  Date of admission:  8/25/2021 12:12 PM  MRN:   [id number]  YOB: 1945  PCP:    No primary care provider on file. Reason for Admission:  Chest pain [R07.9]  Chest pain, unspecified type [R07.9]  NSTEMI (non-ST elevated myocardial infarction) ([facility] [address] [I21.4]    Subjective:   Clinical Changes / Abnormalities:    Patient seen and examined at bedside. No acute events overnight. No chest pain or shortness of breath. Urine output in the last 24 hours:     Intake/Output Summary (Last 24 hours) at 8/30/2021 0704  Last data filed at 8/30/2021 0606  Gross per 24 hour   Intake 1835 ml   Output 1060 ml   Net 775 ml     I/O since admission: +657.9cc    Medications:   Scheduled Meds:   furosemide  40 mg IntraVENous BID    ticagrelor  90 mg Oral BID    carvedilol  3.125 mg Oral BID WC    sodium chloride flush  10 mL IntraVENous 2 times per day    aspirin  81 mg Oral Daily    amiodarone  200 mg Oral TID    mupirocin   Nasal BID    polyethylene glycol  17 g Oral Daily    atorvastatin  20 mg Oral Nightly    pantoprazole  40 mg Oral Daily    ipratropium-albuterol  1 ampule Inhalation Q4H WA    insulin glargine  0.15 Units/kg Subcutaneous Nightly     Continuous Infusions:   norepinephrine Stopped (08/30/21 0450)    dextrose       CBC:   Recent Labs     08/28/21  1352 08/28/21  1352 08/29/21  0501 08/29/21  1637 08/30/21  0455   WBC 19.7*  --  16.9*  --  16.0*   HGB 9.5*   < > 9.4* 9.4* 8.2*     --  217  --  187    < > = values in this interval not displayed.      BMP:    Recent Labs     08/29/21  0501 08/29/21  1210 08/30/21  0455    136 136   K 3.8 5.1 5.1   CL 99 99 100   CO2 24 27 25   BUN 19 20 28*   CREATININE 0.71 0.89 1.07*   GLUCOSE 101* 129* 118*     Hepatic:   Recent Labs     08/27/21  1642   *   ALT 33   BILITOT 0.60   ALKPHOS 53     Troponin: No results for length reduced to 0%. Pre procedure     JULIETH III flow was noted. Post Procedure JULIETH III flow was present. Good     runoff was present. The lesion was diagnosed as High Risk (C). The lesion     was diffuse, eccentric and heavily calcified. The lesion showed with     irregular contour, moderate angulation and moderate tortuosity.         Comments:Post NC post dilation grade III perforation noted with     extravasation into pericardial space. 2 Covered stents were used to seal     perforation with good results.     Devices used         - Luge Wire 182 cm. Number of passes: 1.         - Mini Trek Balloon 2.5mm x 30mm. 2 inflation(s) to a max pressure of:     14 [place].         - Xience Claudia 3.0 x 38 KVNG. 1 inflation(s) to a max pressure of: 16     [place].         - NC Trek 3.25mm x 15mm. 4 inflation(s) to a max pressure of: 18 [place].         - Papyrus Covered Stent 3.0 x 26. 1 inflation(s) to a max pressure of:     14 [place].         - Papyrus Covered Stent 3.0 x 15. 1 inflation(s) to a max pressure of:     10 [place].         - Xience Claudia 2.75 x 28 KVNG. 1 inflation(s) to a max pressure of: 12     [place].         Lesion on Prox LAD: 90% stenosis 24 mm length reduced to 0%. Pre     procedure JULIETH III flow was noted. Post Procedure JULIETH III flow was     present. Good runoff was present. The lesion was diagnosed as High Risk     (C).       Devices used         - Xience Claudia 3.25 x 28 KVNG. 1 inflation(s) to a max pressure of: 16     [place].         - 6 Fr. Guideliner Catheter. Number of passes: 1.       LCx: Diffuse irregularities 20-30%. RCA: Diffuse irregularities 20-30%. Small. non-dominant. Graft Lesions         Lesion on Aorta Left to Mid LAD: Proximal body. 90% stenosis.       Lesion on Aorta Left to Mid LAD: Middle body. 95% stenosis.       Lesion on Aorta Left to Mid LAD: Distal body. 90% stenosis.       Cardiac Grafts        - Carlos Eduardo Soto is a Vein graft that originates at the Aorta Left and attaches to       the Mid LAD. Multiple stenosis         The LV gram was performed in the PAYNE 30 position. LVEF: 35 %        Conclusions:          Severe diffuse heavily calcified proximal and mid Long LAD stenosis.  SVG-LAD is degenerated with multiple 90-95% stenosis and non-functional.    Minimal LCX and RCA disease.    Moderately impaired ventricular function.    PTCA and KVNG to proximal and mid LAD resulted in grade III perforation,    hemodynamic compromise responded to 2 covered stents to seal perforation    followed by hemodynamic stability    Echo showed moderate pericardial effusion with no signs of tamponade.    Pericadiocentesis was performed with removal of about 40 cc of bloody    fluids.    Final angiogram showed JULIETH III flow in LAD with 0% residual stenosis.        Recommendations:          Routine Post PCI Orders.    Medical therapy as needed.    Risk factor modification.    Repeat limited echo in few hours.  CCU admission      Assessment / Acute Cardiac Problems:   1. NSTEMI s/p PCI to mid LAD.  PTCA and KVNG to proximal and mid LAD resulted in grade III perforation, hemodynamic compromise responded to 2 covered stents to seal perforation followed by hemodynamic stability    2. CAD s/p CABG  3. Cardiac tamponade s/p PERICARDIAL WINDOW W/ STERNAL WIRE REMOVAL 8/28/2021  4. HTN  5. HLD  6. ICM EF 35%     Plan of Treatment:   1. Continue ASA & Brilinta  2. Continue Lipitor  3. Hold coreg 3.125 mg BID in setting of hypotension  4. Continue amiodarone 200 TID, per CV surgery recommendation  5. Limited 2D ECHO this AM to evaluate pericardial effusion. 6. Pericardial window management per CV surgery. 7. K>4, Mg>2    Clarissa Villa MD  Fellow, [address]      I performed a history and physical examination of the patient and discussed management with the resident. I reviewed the residents note and agree with the documented findings and plan of care.  Any areas of disagreement are noted on the chart. I was personally present for the key portions of any procedures. I have documented in the chart those procedures where I was not present during the key portions. I have personally evaluated this patient and have completed at least one if not all key elements of the E/M (history, physical exam, and MDM). Additional findings are as noted. Pericardial drain discontinued this am by CT surgery. TTE pending. If no significant effusion, will add metoprolol 12.5mg po BID with holding parameters.     Sandra Geiger MD

## 2021-08-30 NOTE — PROGRESS NOTES
[facility]  Internal Medicine Teaching Residency Program  Inpatient Daily Progress Note  ______________________________________________________________________________    Patient: Sunita Sotelo  YOB: 1945   WB    Acct: [de-identified]     Room: [address] [place] [address]  Admit date: 2021  Today's date: 21  Number of days in the hospital: 4    SUBJECTIVE   Admitting Diagnosis: NSTEMI (non-ST elevated myocardial infarction) ([facility] [address]  CC:   Pt examined at bedside. Chart & results reviewed. /64, MAP 76  Keep MAP above 70 per cardio  NC 3 L, O2  Patient had hypotension last night  Cardio consulted  Started on Levophed at night,stopped in the morning  IV albumin  Given  Patient denies any chest pain  IV diuretics stopped      ROS:  Constitutional:  negative for chills, fevers, sweats  Respiratory:  negative for cough, dyspnea on exertion, hemoptysis, shortness of breath, wheezing  Cardiovascular:  negative for chest pain, chest pressure/discomfort, lower extremity edema, palpitations  Gastrointestinal:  negative for abdominal pain, constipation, diarrhea, nausea, vomiting  Neurological:  negative for dizziness, headache  BRIEF HISTORY     The patient is a pleasant 76 y. o. female presents with a chief complaint of chest pain in the center of the chest, 7/10 in intensity and radiating to the jaw.  Patient received aspirin  and sublingual nitro which decreased her chest pain to 1/10.  She denies any dyspnea or diaphoresis.  Patient has history of coronary artery disease.     The patient denies any fever, cough, lung disease, orthopnea, PND, headache, nasal discharge, abdominal pain, diarrhea, urinary symptoms.     Patient has history of coronary artery disease and undergone coronary artery bypass grafting at age 51, carotid endarterectomy at age 50.  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     Patient takes aspirin 81 mg, Plavix 75 mg daily, atorvastatin 10 mg daily, biotin, diltiazem 180 mg daily, metoprolol 25 mg twice daily and isosorbide 10 mg by mouth twice daily.     On arrival vitals are temp 98.3, heart rate 61, /70 saturating on room air.   Pertinent labs: Sodium 141 potassium 4.7 creatinine 0.93, proBNP 1690, Trop 13, glucose 107, WBC 5.5, Hb 13.4, chest x-ray no acute pulmonary disease and ECG nonspecific T wave abnormality. Her repeat Trop level are 267, 291.     During my examination patient is pain-free, not in any distress, chest is clear without wheezes/crackles, heart sounds normal, no murmurs appreciated, abdomen soft, nontender and no pedal edema.     Patient is diagnosed with NSTEMI, PCI to mid LAD, PT and KVNG proximal and mid LAD complicated by grade 3 perforation causing hemodynamic compromise responded to 2 course terms to seal the perforation, hemodynamic stable now.     Patient developed cardiac tamponade, CT surgery was consulted and pericardial drain was placed in the removal of 200 cc of blood. Postop TRISHA show LVEF 30%. Patient started on ASA, Brilinta, Lipitor, Coreg 12.5, Lasix 40 mg twice daily. Central venous line and arterial line were placed.     Patient is tachycardic now, maintaining saturation on NC 6 L, multiple ecchymosis noted over upper back. We will repeat echo today. Trop trending down. OBJECTIVE     Vital Signs:  BP (!) 101/51   Pulse 89   Temp 98.4 °F (36.9 °C) (Axillary)   Resp 17   Ht 5' 1\" (1.549 m)   Wt 117 lb (53.1 kg)   SpO2 96%   BMI 22.11 kg/m²     Temp (24hrs), Av.2 °F (36.8 °C), Min:97.5 °F (36.4 °C), Max:98.5 °F (36.9 °C)    In: 716   Out: 1020 [Urine:985]    Physical Exam:  Constitutional: Dehydrated alert, oriented, cooperative and in no apparent distress. Head:normocephalic and atraumatic. EENT:  PERRLA. No conjunctival injections. Septum was midline, mucosa was without erythema, exudates or cobblestoning. No thrush was noted.    Neck: Supple without thyromegaly. No elevated JVP. Trachea was midline. Respiratory: Chest was symmetrical without dullness to percussion. Breath sounds bilaterally were clear to auscultation. There were no wheezes, rhonchi or rales. There is no intercostal retraction or use of accessory muscles. No egophony noted. Cardiovascular: Regular without murmur, clicks, gallops or rubs. Abdomen: Slightly rounded and soft without organomegaly. No rebound, rigidity or guarding was appreciated. Lymphatic: No lymphadenopathy. Musculoskeletal: Normal curvature of the spine. No gross muscle weakness. Extremities:  No lower extremity edema, ulcerations, tenderness, varicosities or erythema. Muscle size, tone and strength are normal.  No involuntary movements are noted. Skin:  Warm and dry. Good color, turgor and pigmentation. No lesions or scars.   No cyanosis or clubbing  Neurological/Psychiatric: The patient's general behavior, level of consciousness, thought content and emotional status is normal.        Medications:  Scheduled Medications:    albumin human  25 g IntraVENous Once    furosemide  40 mg IntraVENous BID    ticagrelor  90 mg Oral BID    carvedilol  3.125 mg Oral BID WC    sodium chloride flush  10 mL IntraVENous 2 times per day    aspirin  81 mg Oral Daily    amiodarone  200 mg Oral TID    mupirocin   Nasal BID    polyethylene glycol  17 g Oral Daily    atorvastatin  20 mg Oral Nightly    pantoprazole  40 mg Oral Daily    ipratropium-albuterol  1 ampule Inhalation Q4H WA    insulin glargine  0.15 Units/kg Subcutaneous Nightly     Continuous Infusions:    norepinephrine 2 mcg/min (08/30/21 0300)    dextrose       PRN Medicationssodium chloride flush, 10 mL, PRN  ondansetron, 4 mg, Q8H PRN  acetaminophen, 650 mg, Q4H PRN  oxyCODONE-acetaminophen, 1 tablet, Q4H PRN   Or  oxyCODONE-acetaminophen, 2 tablet, Q4H PRN  fentanNYL, 25 mcg, Q1H PRN   Or  fentanNYL, 50 mcg, Q1H PRN  hydrALAZINE, 5 mg, Q5 Min PRN  metoprolol, 2.5 mg, Q10 Min PRN  sodium bicarbonate, 50 mEq, Q30 Min PRN  diphenhydrAMINE, 25 mg, Nightly PRN  bisacodyl, 5 mg, Daily PRN  fleet, 1 enema, Daily PRN  calcium chloride IVPB, 1,000 mg, PRN  magnesium sulfate, 1,000 mg, PRN  potassium chloride, 20 mEq, PRN  albumin human, 25 g, PRN  glucose, 15 g, PRN  dextrose, 12.5 g, PRN  glucagon (rDNA), 1 mg, PRN  dextrose, 100 mL/hr, PRN        Diagnostic Labs:  CBC:   Recent Labs     08/28/21  1352 08/28/21  1352 08/29/21  0501 08/29/21  1637 08/30/21  0455   WBC 19.7*  --  16.9*  --  16.0*   RBC 3.01*  --  3.02*  --  2.65*   HGB 9.5*   < > 9.4* 9.4* 8.2*   HCT 29.2*   < > 29.4* 29.8* 26.2*   MCV 97.0  --  97.4  --  98.9   RDW 13.1  --  13.2  --  13.5     --  217  --  187    < > = values in this interval not displayed. BMP:   Recent Labs     08/28/21  1352 08/28/21  1352 08/28/21  2101 08/29/21  0501 08/29/21  1210     --   --  135 136   K 4.3   < > 3.1* 3.8 5.1     --   --  99 99   CO2 21  --   --  24 27   BUN 19  --   --  19 20   CREATININE 0.64  --   --  0.71 0.89    < > = values in this interval not displayed. BNP: No results for input(s): BNP in the last 72 hours. PT/INR:   Recent Labs     08/28/21  1352 08/29/21  0501   PROTIME 11.2 10.7   INR 1.1 1.0     APTT:   Recent Labs     08/27/21  1642 08/28/21  1105 08/28/21  1352   APTT 24.2 20.4* 21.7     CARDIAC ENZYMES: No results for input(s): CKMB, CKMBINDEX, TROPONINI in the last 72 hours.     Invalid input(s): CKTOTAL;3  FASTING LIPID PANEL:  Lab Results   Component Value Date    CHOL 173 10/28/2015    HDL 70 05/07/2021    TRIG 76 10/28/2015     LIVER PROFILE:   Recent Labs     08/27/21  0533 08/27/21  1642   * 191*   ALT 28 33   BILIDIR  --  0.12   BILITOT 0.63 0.60   ALKPHOS 55 53      MICROBIOLOGY:   Lab Results   Component Value Date/Time    CULTURE NO GROWTH 3 DAYS 08/27/2021 04:15 PM       Imaging:    XR ABDOMEN (KUB) (SINGLE AP VIEW)    Result Date: 8/27/2021  Nonspecific bowel gas pattern without evidence for obstruction. XR CHEST PORTABLE    Result Date: 8/30/2021  1. No significant change in central predominant airspace and interstitial opacities, most likely edema (cardiogenic or noncardiogenic) or pneumonia. 2. Persistent trace to small bilateral pleural effusions with underlying bibasilar passive atelectasis. Superimposed pneumonia and aspiration are not excluded especially on the left. XR CHEST PORTABLE    Result Date: 8/29/2021  Improved aeration in the lung apices, though worsening mid and lower lung field interstitial and alveolar infiltrates with small bilateral effusions. XR CHEST PORTABLE    Result Date: 8/28/2021  Status post open heart surgery; tubes and lines appear satisfactory. Likely pulmonary edema, as discussed above. No pneumothorax or large pleural effusion. XR CHEST PORTABLE    Result Date: 8/27/2021  Chronic pulmonary change with left basilar infiltrate. XR CHEST PORTABLE    Result Date: 8/25/2021  No acute pulmonary disease. Calcific atherosclerotic disease aorta. ASSESSMENT & PLAN       NSTEMI s/p PCI to mid LAD.    · PT and KVNG proximal and mid  LAD, complicated by grade 3 perforation causing hemodynamic compromise. · Responded to 2 covered stents to seal the preparation, hemodynamic stability achieved  · TTE shows global left ventricular systolic dysfunction, EF 35 to 40%, abnormal motion of the anterior and lateral walls of left ventricle.   · Trending down 1823> 1752> 1518  · Continue ASA and Brilinta, Lipitor, low-dose beta-blocker, amiodarone 200mg TID,IV Lasix 40 mg daily  · Patient systolic BP drop below 733 yesterday night  · Cardiology was consulted  · They started her on Levophed at night and discontinued it in the morning  · IV albumin is given  · Chest x-ray done in the morning shows no significant changes from the previous studies, bilateral trace pleural effusion  · Urine output: 345 mL per 15 hours  · Creatinine 0.89> 1.07  · Cardio recommendation appreciated  · Stop IV diuretics        CAD s/p CABG:  · S/p PCI to mid LAD  · Continue ASA, Brilinta, Lipitor, low-dose beta-blocker, diuretics     Cardiac Tamponade :    · TTE showed : Global left ventricular systolic function moderately reduced , large loculated pericardial effusion is seen mostly posteriorly near the left ventricle some compression of the ventricle noted. · Pericardial window with sternal wire removal was done for cardiac tamponade  ·  Blood clot especially in the region of LAD and around LV,200 cc was removed   · Heart rate went from 150 to 88, BP remained stable  · Central venous line and arterial line are placed  · Postop TRISHA: LVEF 30% postop, anterior hypokinesis, large pericardial effusion on lateral LV and anterior RV. · Continue Coreg 12.5 mg twice daily and Lasix 40 mg twice daily  · Monitor blood pressure, H&H at 3 pm  · Keep the drain in place and DC arterial line per CTS  · Echo: Global left ventricle systolic dysfunction, EF 35 to 40%      Large lobulated pericardial effusion posteriorly, some compression of heparin drip noted  Follow CTS recommendation  Stop IV diuretics         Hypertension:  Currently under control  Stop coreg, lisinopril 5 mg daily  Stop IV diuretics due to dehydration             DVT ppx : SCD   Stress ulcers prophylaxis:        PT/OT: following  Discharge Planning / SW: on board    Zohreh Aranda MD  Internal Medicine Resident, PGY-1  [address], [place]  8/30/2021, 5:16 AM

## 2021-08-31 ENCOUNTER — APPOINTMENT (OUTPATIENT)
Dept: GENERAL RADIOLOGY | Age: 76
DRG: 270 | End: 2021-08-31
Payer: MEDICARE

## 2021-08-31 LAB
ANION GAP SERPL CALCULATED.3IONS-SCNC: 10 MMOL/L (ref 9–17)
BUN BLDV-MCNC: 25 MG/DL (ref 8–23)
BUN/CREAT BLD: ABNORMAL (ref 9–20)
CALCIUM SERPL-MCNC: 8.4 MG/DL (ref 8.6–10.4)
CHLORIDE BLD-SCNC: 100 MMOL/L (ref 98–107)
CO2: 25 MMOL/L (ref 20–31)
CREAT SERPL-MCNC: 0.79 MG/DL (ref 0.5–0.9)
EKG ATRIAL RATE: 108 BPM
EKG P AXIS: 60 DEGREES
EKG P-R INTERVAL: 130 MS
EKG Q-T INTERVAL: 318 MS
EKG QRS DURATION: 62 MS
EKG QTC CALCULATION (BAZETT): 426 MS
EKG R AXIS: 59 DEGREES
EKG T AXIS: -33 DEGREES
EKG VENTRICULAR RATE: 108 BPM
GFR AFRICAN AMERICAN: >60 ML/MIN
GFR NON-AFRICAN AMERICAN: >60 ML/MIN
GFR SERPL CREATININE-BSD FRML MDRD: ABNORMAL ML/MIN/{1.73_M2}
GFR SERPL CREATININE-BSD FRML MDRD: ABNORMAL ML/MIN/{1.73_M2}
GLUCOSE BLD-MCNC: 116 MG/DL (ref 70–99)
GLUCOSE BLD-MCNC: 132 MG/DL (ref 65–105)
HCT VFR BLD CALC: 24.8 % (ref 36.3–47.1)
HEMOGLOBIN: 7.7 G/DL (ref 11.9–15.1)
INR BLD: 1
MAGNESIUM: 2.4 MG/DL (ref 1.6–2.6)
MCH RBC QN AUTO: 31.2 PG (ref 25.2–33.5)
MCHC RBC AUTO-ENTMCNC: 31 G/DL (ref 28.4–34.8)
MCV RBC AUTO: 100.4 FL (ref 82.6–102.9)
NRBC AUTOMATED: 0.2 PER 100 WBC
PDW BLD-RTO: 13.6 % (ref 11.8–14.4)
PLATELET # BLD: 176 K/UL (ref 138–453)
PMV BLD AUTO: 11.8 FL (ref 8.1–13.5)
POTASSIUM SERPL-SCNC: 4.9 MMOL/L (ref 3.7–5.3)
PROTHROMBIN TIME: 10.7 SEC (ref 9.1–12.3)
RBC # BLD: 2.47 M/UL (ref 3.95–5.11)
SODIUM BLD-SCNC: 135 MMOL/L (ref 135–144)
WBC # BLD: 9.8 K/UL (ref 3.5–11.3)

## 2021-08-31 PROCEDURE — 6370000000 HC RX 637 (ALT 250 FOR IP): Performed by: NURSE PRACTITIONER

## 2021-08-31 PROCEDURE — 2100000001 HC CVICU R&B

## 2021-08-31 PROCEDURE — 94667 MNPJ CHEST WALL 1ST: CPT

## 2021-08-31 PROCEDURE — 6360000002 HC RX W HCPCS: Performed by: STUDENT IN AN ORGANIZED HEALTH CARE EDUCATION/TRAINING PROGRAM

## 2021-08-31 PROCEDURE — 36415 COLL VENOUS BLD VENIPUNCTURE: CPT

## 2021-08-31 PROCEDURE — 2580000003 HC RX 258: Performed by: NURSE PRACTITIONER

## 2021-08-31 PROCEDURE — 97110 THERAPEUTIC EXERCISES: CPT

## 2021-08-31 PROCEDURE — 94640 AIRWAY INHALATION TREATMENT: CPT

## 2021-08-31 PROCEDURE — 80048 BASIC METABOLIC PNL TOTAL CA: CPT

## 2021-08-31 PROCEDURE — 6370000000 HC RX 637 (ALT 250 FOR IP): Performed by: STUDENT IN AN ORGANIZED HEALTH CARE EDUCATION/TRAINING PROGRAM

## 2021-08-31 PROCEDURE — 99232 SBSQ HOSP IP/OBS MODERATE 35: CPT | Performed by: INTERNAL MEDICINE

## 2021-08-31 PROCEDURE — 82947 ASSAY GLUCOSE BLOOD QUANT: CPT

## 2021-08-31 PROCEDURE — 2700000000 HC OXYGEN THERAPY PER DAY

## 2021-08-31 PROCEDURE — 97535 SELF CARE MNGMENT TRAINING: CPT

## 2021-08-31 PROCEDURE — 97116 GAIT TRAINING THERAPY: CPT

## 2021-08-31 PROCEDURE — 94761 N-INVAS EAR/PLS OXIMETRY MLT: CPT

## 2021-08-31 PROCEDURE — 83735 ASSAY OF MAGNESIUM: CPT

## 2021-08-31 PROCEDURE — 85027 COMPLETE CBC AUTOMATED: CPT

## 2021-08-31 PROCEDURE — 71045 X-RAY EXAM CHEST 1 VIEW: CPT

## 2021-08-31 PROCEDURE — 85610 PROTHROMBIN TIME: CPT

## 2021-08-31 RX ORDER — GUAIFENESIN 600 MG/1
600 TABLET, EXTENDED RELEASE ORAL 2 TIMES DAILY
Status: DISCONTINUED | OUTPATIENT
Start: 2021-08-31 | End: 2021-09-03 | Stop reason: HOSPADM

## 2021-08-31 RX ADMIN — TICAGRELOR 90 MG: 90 TABLET ORAL at 20:02

## 2021-08-31 RX ADMIN — ASPIRIN 81 MG: 81 TABLET, COATED ORAL at 08:06

## 2021-08-31 RX ADMIN — AMIODARONE HYDROCHLORIDE 200 MG: 200 TABLET ORAL at 08:06

## 2021-08-31 RX ADMIN — AMIODARONE HYDROCHLORIDE 200 MG: 200 TABLET ORAL at 15:17

## 2021-08-31 RX ADMIN — PANTOPRAZOLE SODIUM 40 MG: 40 TABLET, DELAYED RELEASE ORAL at 08:06

## 2021-08-31 RX ADMIN — IPRATROPIUM BROMIDE AND ALBUTEROL SULFATE 1 AMPULE: .5; 2.5 SOLUTION RESPIRATORY (INHALATION) at 09:51

## 2021-08-31 RX ADMIN — AMIODARONE HYDROCHLORIDE 200 MG: 200 TABLET ORAL at 20:02

## 2021-08-31 RX ADMIN — POLYETHYLENE GLYCOL 3350 17 G: 17 POWDER, FOR SOLUTION ORAL at 08:06

## 2021-08-31 RX ADMIN — IPRATROPIUM BROMIDE AND ALBUTEROL SULFATE 1 AMPULE: .5; 2.5 SOLUTION RESPIRATORY (INHALATION) at 17:05

## 2021-08-31 RX ADMIN — GUAIFENESIN 600 MG: 600 TABLET, EXTENDED RELEASE ORAL at 08:06

## 2021-08-31 RX ADMIN — SODIUM CHLORIDE, PRESERVATIVE FREE 10 ML: 5 INJECTION INTRAVENOUS at 20:02

## 2021-08-31 RX ADMIN — OXYCODONE HYDROCHLORIDE AND ACETAMINOPHEN 1 TABLET: 5; 325 TABLET ORAL at 01:17

## 2021-08-31 RX ADMIN — IPRATROPIUM BROMIDE AND ALBUTEROL SULFATE 1 AMPULE: .5; 2.5 SOLUTION RESPIRATORY (INHALATION) at 20:27

## 2021-08-31 RX ADMIN — ATORVASTATIN CALCIUM 20 MG: 20 TABLET, FILM COATED ORAL at 20:02

## 2021-08-31 RX ADMIN — MUPIROCIN: 20 OINTMENT TOPICAL at 20:02

## 2021-08-31 RX ADMIN — ACETAMINOPHEN 650 MG: 325 TABLET ORAL at 13:26

## 2021-08-31 RX ADMIN — OXYCODONE HYDROCHLORIDE AND ACETAMINOPHEN 1 TABLET: 5; 325 TABLET ORAL at 18:41

## 2021-08-31 RX ADMIN — OXYCODONE HYDROCHLORIDE AND ACETAMINOPHEN 1 TABLET: 5; 325 TABLET ORAL at 06:19

## 2021-08-31 RX ADMIN — GUAIFENESIN 600 MG: 600 TABLET, EXTENDED RELEASE ORAL at 02:57

## 2021-08-31 RX ADMIN — TICAGRELOR 90 MG: 90 TABLET ORAL at 08:06

## 2021-08-31 RX ADMIN — FUROSEMIDE 40 MG: 10 INJECTION, SOLUTION INTRAMUSCULAR; INTRAVENOUS at 18:23

## 2021-08-31 RX ADMIN — IPRATROPIUM BROMIDE AND ALBUTEROL SULFATE 1 AMPULE: .5; 2.5 SOLUTION RESPIRATORY (INHALATION) at 13:36

## 2021-08-31 RX ADMIN — SODIUM CHLORIDE, PRESERVATIVE FREE 10 ML: 5 INJECTION INTRAVENOUS at 08:06

## 2021-08-31 ASSESSMENT — PAIN DESCRIPTION - PAIN TYPE
TYPE: SURGICAL PAIN

## 2021-08-31 ASSESSMENT — PAIN SCALES - GENERAL
PAINLEVEL_OUTOF10: 3
PAINLEVEL_OUTOF10: 4
PAINLEVEL_OUTOF10: 4
PAINLEVEL_OUTOF10: 2
PAINLEVEL_OUTOF10: 5

## 2021-08-31 NOTE — PROGRESS NOTES
Occupational Therapy  [facility]  Daily Treatment Note  NAME: Milly Miguel  : 1945  MRN: [id number]    Date of Service: 2021    Discharge Recommendations:  Patient would benefit from continued therapy after discharge  OT Equipment Recommendations  ADL Assistive Devices: Transfer Tub Bench;Reacher    Assessment   Performance deficits / Impairments: Decreased functional mobility ; Decreased safe awareness;Decreased balance;Decreased ADL status; Decreased endurance;Decreased high-level IADLs;Decreased strength  Prognosis: Good  Patient Education: Pt ed on OT role, sternal precautions, use of incentive spirometer, use of heart pillow, proper hand placement for safe transfers, benefits of OOB activity and continued therapy-Fair/Good return  REQUIRES OT FOLLOW UP: Yes  Activity Tolerance  Activity Tolerance: Patient limited by fatigue  Safety Devices  Safety Devices in place: Yes  Type of devices: Gait belt;Call light within reach;Nurse notified; Left in chair  Restraints  Initially in place: No         Patient Diagnosis(es): The primary encounter diagnosis was Chest pain, unspecified type. A diagnosis of NSTEMI (non-ST elevated myocardial infarction) [facility]) was also pertinent to this visit. has a past medical history of Atherosclerosis, CAD (coronary artery disease), Hydatidiform mole, and Rosacea. has a past surgical history that includes Carotid endarterectomy (age 44); Coronary artery bypass graft (age 45); Tonsillectomy and adenoidectomy (age 8); Dilation & curettage (); and Pericardium surgery (N/A, 2021).     Restrictions  Restrictions/Precautions  Restrictions/Precautions:Surgical Protocols, Cardiac, Fall Risk  Required Braces or Orthoses?: No  Position Activity Restriction  Sternal Precautions: No Pushing, No Pulling, 5# Lifting Restrictions  Other position/activity restrictions: ambulate pt  Subjective   General  Chart Reviewed: Yes  Patient assessed for rehabilitation services?: Yes  Family / Caregiver Present: No  General Comment  Comments: RN ok'd patient for OT tx. Pt pleasant and cooperative throughout. Vital Signs  Patient Currently in Pain: No   Orientation  Orientation  Overall Orientation Status: Within Functional Limits  Objective    ADL  Feeding: Independent; Increased time to complete (Pt finishing breakfast upon arrival)  Grooming: Modified independent ; Increased time to complete;Setup (Pt brushed teeth, washed face sitting in chair)  UE Dressing: Contact guard assistance; Increased time to complete (Pt adjusted gown standing with RW. CGA for safety)  Additional Comments: Pt declined further ADLs this date d/t fatigue        Balance  Sitting Balance: Stand by assistance (Seated in chair for grooming tasks)  Standing Balance: Contact guard assistance  Standing Balance  Time: ~3-4 min  Activity: Standing at chair  Comment: No LOB noted, pt fatigued this date.  Pt demo good use of heart pillow when coughing  Functional Mobility  Functional Mobility Comments: Pt declined func mobility this date d/t fatigue  Bed mobility  Scooting: Minimal assistance  Comment: Pt in chair at arrival and departure  Transfers  Sit to stand: Minimal assistance  Stand to sit: Minimal assistance  Transfer Comments: Pt demo properhand placement for safe transfer and good use of heart pillow     Cognition  Overall Cognitive Status: [facility]     Plan   Plan  Times per week: 4-6x/wk  Current Treatment Recommendations: Strengthening, Endurance Training, Patient/Caregiver Education & Training, Equipment Evaluation, Education, & procurement, Self-Care / ADL, Home Management Training, Safety Education & Training, Functional Mobility Training, Balance Training  AM-PAC Score        AM-PAC Inpatient Daily Activity Raw Score: 20 (08/31/21 1214)  AM-PAC Inpatient ADL T-Scale Score : 42.03 (08/31/21 1214)  ADL Inpatient CMS 0-100% Score: 38.32 (08/31/21 1214)  ADL Inpatient CMS G-Code Modifier : [place] (08/31/21 1214)    Goals  Short term goals  Time Frame for Short term goals: Patient will, by discharge  Short term goal 1: demo UB ADLs at [facility]  Short term goal 2: demo LB ADLs at supervision using AE PRN  Short term goal 3: demo functional transfers/mobility using LRD at Supervision to engage in ADLs safely  Short term goal 4: demo 10+ min of dynamic standing tolerance reaching outside BHAVESH to engage in ADLs safely  Short term goal 5: demo 100% adherence to precautions during functional tasks with 0 cues       Therapy Time   Individual Concurrent Group Co-treatment   Time In 0851         Time Out 0933         Minutes 42         Timed Code Treatment Minutes: 45 Minutes   Pt in chair upon arrival, pleasant and agreeable to tx this date. Pt retired to chair at end of session, call light within reach, RN notified.      STEPHANIE Winters/SHAY

## 2021-08-31 NOTE — PROGRESS NOTES
Physician Progress Note      PATIENT:               Dimitry Delgado  CSN #:                  [id number]  :                       1945  ADMIT DATE:       2021 12:12 PM  [address] [place] DATE:  RESPONDING  PROVIDER #:        Lakshmi Watson MD          QUERY TEXT:    Pt admitted with NSTEMI and has CHF documented. If possible, please document   in progress notes and discharge summary further specificity regarding the type   and acuity of CHF:    The medical record reflects the following:  Risk Factors: NSTEMI, cardiac tamponade, pericardial effusion, ischemic   cardiomyopathy  Clinical Indicators: ECHO- EF 35-40% with moderately reduced systolic and   diastolic dysfunction,  - PN - lungs - diminished, CXR-  - Extensive   infiltrates are seen throughout the lungs bilaterally, with worsening at the   lung bases though some improved aeration in the upper lung fields. repeat on    -  Vascular congestion, basilar opacities and pleural effusions are again   demonstrated without appreciable change  Treatment: Emergent pericardial window insertion, PO- Coreg, Lopressor. IV-   Lasix,    Thank Ivanna Gutierrez RN, CDS-  email - [email or web address]  office- [phone]  Options provided:  -- Acute on Chronic Systolic CHF/HFrEF  -- Acute on Chronic Diastolic CHF/HFpEF  -- Acute on Chronic Systolic and Diastolic CHF  -- Acute Systolic CHF/HFrEF  -- Acute Diastolic CHF/HFpEF  -- Acute Systolic and Diastolic CHF  -- Chronic Systolic CHF/HFrEF  -- Chronic Diastolic CHF/HFpEF  -- Chronic Systolic and Diastolic CHF  -- does not have CHF  -- Other - I will add my own diagnosis  -- Disagree - Not applicable / Not valid  -- Disagree - Clinically unable to determine / Unknown  -- Refer to Clinical Documentation Reviewer    PROVIDER RESPONSE TEXT:    This patient is in acute systolic CHF/HFrEF.     Query created by: Raisa Celaya on 2021 10:24 AM      Electronically signed by:  Lakshmi Watson MD 2021 11:08 AM

## 2021-08-31 NOTE — PROGRESS NOTES
Physical Therapy  Facility/Department: [facility] CAR 1  Daily Treatment Note  NAME: Collette Landau  : 1945  MRN: [id number]    Date of Service: 2021    Discharge Recommendations: Further therapy recommended at discharge. Patient would benefit from continued therapy after discharge   PT Equipment Recommendations  Equipment Needed: No  Other: Pt has rolling walker. Assessment   Body structures, Functions, Activity limitations: Decreased functional mobility ; Decreased endurance;Decreased strength  Assessment: Pt amb 26' using RW x modA, requiring 2 standing rest breaks with one seated rest break. Pt is significantly limited by decrease endurance and weakness. Pt perform seated therapeutic exercises, with cueing for correct sequencing. Pt will benefit from continued therapy to address deficits. Decision Making: Medium Complexity  PT Education: Plan of Care;General Safety;Goals  REQUIRES PT FOLLOW UP: Yes  Activity Tolerance  Activity Tolerance: Patient limited by fatigue;Patient limited by endurance     Patient Diagnosis(es): The primary encounter diagnosis was Chest pain, unspecified type. A diagnosis of NSTEMI (non-ST elevated myocardial infarction) [facility]) was also pertinent to this visit. has a past medical history of Atherosclerosis, CAD (coronary artery disease), Hydatidiform mole, and Rosacea. has a past surgical history that includes Carotid endarterectomy (age 44); Coronary artery bypass graft (age 45); Tonsillectomy and adenoidectomy (age 8); Dilation & curettage (); and Pericardium surgery (N/A, 2021).     Restrictions  Restrictions/Precautions  Restrictions/Precautions: Up as Tolerated, Surgical Protocols, Cardiac, Fall Risk  Required Braces or Orthoses?: No  Position Activity Restriction  Other position/activity restrictions: ambulate patient  Subjective   General  Family / Caregiver Present: Yes ()  Subjective  Subjective: RN and pt agreeable to PT this AM. Pt sitting in chair upon arrivial was cooperative and pleasant t/o. General Comment  Comments:  Pain Screening  Patient Currently in Pain: No  Vital Signs  Patient Currently in Pain: No       Orientation  Orientation  Overall Orientation Status: Within Functional Limits  Cognition      Objective   Bed mobility  Comment: HOMA  Transfers  Sit to Stand: Moderate Assistance  Stand to sit: Moderate Assistance  Comment: pt requires modA and cueing for general heart precautions. pt demo with good return. Ambulation  Ambulation?: Yes  More Ambulation?: No  Ambulation 1  Surface: level tile  Device: Rolling Walker  Other Apparatus: O2 (3L)  Assistance: Moderate assistance  Quality of Gait:  Extremely slow with short steps and cautious  Gait Deviations: Slow Crystal;Decreased step length;Decreased step height  Distance: 26ft  Comments: Pt requires 2 standing rest breaks and 1 seated rest break. durring amb, becomes fatigue and light headed. Stairs/Curb  Stairs?: No     Balance  Sitting - Static: Good;-  Sitting - Dynamic: Fair;+  Standing - Static: Fair;+  Standing - Dynamic: Fair  Comments: Standing with rolling walker   Exercises: Seated LE exercise program: Long Arc Quads, hip abduction/adduction, heel/toe raises, and marches. Reps: 15x   Comment; pt requires frequent rest breaks during exercises. AM-PAC Score  AM-PAC Inpatient Mobility Raw Score : 14 (08/31/21 1158)  AM-PAC Inpatient T-Scale Score : 38.1 (08/31/21 1158)  Mobility Inpatient CMS 0-100% Score: 61.29 (08/31/21 1158)  Mobility Inpatient CMS G-Code Modifier : CL (08/31/21 1158)   Goals  Short term goals  Time Frame for Short term goals: 14 visits  Short term goal 1: Independent bed mobility  Short term goal 2: Independent transfers  Short term goal 3:  Independent ambulation with least restricitve device 200 ft  Short term goal 4: Pt to navigate 12 stairs SBA  Short term goal 5: Pt to tolerate 30 minutes of activity to improve endurance  and safety with mobility. Patient Goals   Patient goals : Get stronger    Plan    Plan  Times per week: 6x/week  Current Treatment Recommendations: Transfer Training, Endurance Training, Strengthening, Gait Training, Functional Mobility Training, Stair training, Safety Education & Training  Safety Devices  Type of devices: Call light within reach, Left in chair, Nurse notified     Therapy Time   Individual Concurrent Group Co-treatment   Time In 1120         Time Out 1150         Minutes 30         Timed Code Treatment Minutes: 30 Minutes    Treatment performed by Student PTA under the supervision of co-signing PTA who agrees with all treatment and documentation. Patricia Ascencio PTA. Bryan CARUSOA.

## 2021-08-31 NOTE — PROGRESS NOTES
[facility]   Progress Note                    Date:   8/31/2021  Patient name:  Coleen Kaiser  Date of admission:  8/25/2021 12:12 PM  MRN:   [id number]  YOB: 1945  PCP:    No primary care provider on file. Reason for Admission:  Chest pain [R07.9]  Chest pain, unspecified type [R07.9]  NSTEMI (non-ST elevated myocardial infarction) ([facility] [address] [I21.4]    Subjective:   Clinical Changes / Abnormalities:    Patient seen and examined at bedside. No acute events overnight. No chest pain or shortness of breath. Became tachycardiac during the morning and afternoon yesterday. Repeat limited 2D ECHO showed no pericardial effusion. HR improved overnight. Pericardial window drain removed by CT surgery. Urine output in the last 24 hours:     Intake/Output Summary (Last 24 hours) at 8/31/2021 0634  Last data filed at 8/31/2021 0600  Gross per 24 hour   Intake 720 ml   Output 710 ml   Net 10 ml     I/O since admission: +667.9 cc    Medications:   Scheduled Meds:   guaiFENesin  600 mg Oral BID    [Held by provider] furosemide  40 mg IntraVENous BID    ticagrelor  90 mg Oral BID    [Held by provider] carvedilol  3.125 mg Oral BID WC    sodium chloride flush  10 mL IntraVENous 2 times per day    aspirin  81 mg Oral Daily    amiodarone  200 mg Oral TID    mupirocin   Nasal BID    polyethylene glycol  17 g Oral Daily    atorvastatin  20 mg Oral Nightly    pantoprazole  40 mg Oral Daily    ipratropium-albuterol  1 ampule Inhalation Q4H WA    insulin glargine  0.15 Units/kg SubCUTAneous Nightly     Continuous Infusions:   norepinephrine Stopped (08/30/21 0450)    dextrose       CBC:   Recent Labs     08/29/21  0501 08/29/21  0501 08/29/21  1637 08/30/21  0455 08/31/21  0459   WBC 16.9*  --   --  16.0* 9.8   HGB 9.4*   < > 9.4* 8.2* 7.7*     --   --  187 176    < > = values in this interval not displayed.      BMP:    Recent Labs     08/29/21  1210 08/30/21  0455 08/31/21  0459    136 135   K 5.1 5.1 4.9   CL 99 100 100   CO2 27 25 25   BUN 20 28* 25*   CREATININE 0.89 1.07* 0.79   GLUCOSE 129* 118* 116*     Hepatic: No results for input(s): AST, ALT, ALB, BILITOT, ALKPHOS in the last 72 hours. Troponin: No results for input(s): TROPONINI in the last 72 hours. No results for input(s): TROPONINT in the last 72 hours. BNP: No results for input(s): PROBNP in the last 72 hours. No results for input(s): BNP in the last 72 hours. Lipids: No results for input(s): CHOL, HDL in the last 72 hours. Invalid input(s): LDLCALCU  INR:   Recent Labs     08/29/21  0501 08/30/21  0455 08/31/21  0459   INR 1.0 1.0 1.0       Objective:   Vitals: /62   Pulse 86   Temp 98.1 °F (36.7 °C) (Oral)   Resp 16   Ht 5' 1\" (1.549 m)   Wt 115 lb 8 oz (52.4 kg)   SpO2 96%   BMI 21.82 kg/m²    Constitutional and General Appearance:   alert, cooperative, no distress and appears stated age  Respiratory:  On BIPAP. Crackles   Cardiovascular: The apical impulse is not displaced  Heart tones are crisp and normal. Regular S1 and S2. No murmurs. Abdomen:   No masses or tenderness  Bowel sounds present  Extremities:   No Cyanosis or Clubbing   Lower extremity edema: No   Skin: Warm and dry  Neurological:  Alert and oriented. ECHO:   8/26/21  Summary  Left ventricle is normal in size. Global left ventricular systolic function  is moderately reduced. Visually estimated EF 30%. Akinetic apex, mid inferoseptal, apical septal, and apical lateral segments. Severely hypokinetic anteroseptum, apical inferior, and apical anterior. Grade II (moderate) left ventricular diastolic dysfunction. Left atrium is severely dilated. Normal right ventricular size. Right ventricular function appears reduced. Trivial to mild aortic insufficiency. Mitral valve sclerosis with Moderate Mitral Stenosis. Mean gradient is  7mmHg. Mild tricuspid regurgitation. No pulmonary hypertension.  Estimated right ventricular systolic pressure is  37mmHg. Cardiac Angiography:8/26/21  LMCA: Normal 0% stenosis. LAD: Multiple stenosis. Lesion on Mid LAD: 90% stenosis 50 mm length reduced to 0%. Pre procedure     JULIETH III flow was noted. Post Procedure JULIETH III flow was present. Good     runoff was present. The lesion was diagnosed as High Risk (C). The lesion     was diffuse, eccentric and heavily calcified. The lesion showed with     irregular contour, moderate angulation and moderate tortuosity. Comments:Post NC post dilation grade III perforation noted with     extravasation into pericardial space. 2 Covered stents were used to seal     perforation with good results. Devices used         Lesion on Prox LAD: 90% stenosis 24 mm length reduced to 0%. Pre     procedure JULIETH III flow was noted. Post Procedure JULIETH III flow was     present. Good runoff was present. The lesion was diagnosed as High Risk     (C). LCx: Diffuse irregularities 20-30%. RCA: Diffuse irregularities 20-30%. Small. non-dominant. Graft Lesions         Lesion on Aorta Left to Mid LAD: Proximal body. 90% stenosis. Lesion on Aorta Left to Mid LAD: Middle body. 95% stenosis. Lesion on Aorta Left to Mid LAD: Distal body. 90% stenosis. Cardiac Grafts        -  There is a Vein graft that originates at the Aorta Left and attaches to       the Mid LAD. Multiple stenosis         The LV gram was performed in the PAYNE 30 position. LVEF: 35 %        Conclusions:    Severe diffuse heavily calcified proximal and mid Long LAD stenosis. SVG-LAD is degenerated with multiple 90-95% stenosis and non-functional.    Minimal LCX and RCA disease. Moderately impaired ventricular function.     PTCA and KVNG to proximal and mid LAD resulted in grade III perforation,    hemodynamic compromise responded to 2 covered stents to seal perforation    followed by hemodynamic stability    Echo showed moderate pericardial effusion with no signs of tamponade. Pericadiocentesis was performed with removal of about 40 cc of bloody    fluids. Final angiogram showed JULIETH III flow in LAD with 0% residual stenosis. Recommendations:    Routine Post PCI Orders. Medical therapy as needed. Risk factor modification. Repeat limited echo in few hours. CCU admission     ECHO: 8/31/21  Limited Echo  Global left ventricular systolic function is moderately reduced. Calculated  ejection fraction 40% by Leon's method. Visually estimated EF 35-40%. Aortic valve is mildly sclerotic but opens well. No obvious valvular abnormality. Mild to moderate tricuspid regurgitation. Moderate pulmonary hypertension. Estimated right ventricular systolic  pressure is 90DLBB. No significant pericardial effusion is seen. Assessment / Acute Cardiac Problems:   NSTEMI s/p PCI to mid LAD. PTCA and KVNG to proximal and mid LAD resulted in grade III perforation, hemodynamic compromise responded to 2 covered stents to seal perforation followed by hemodynamic stability    CAD s/p CABG  Cardiac tamponade s/p PERICARDIAL WINDOW W/ STERNAL WIRE REMOVAL 8/28/2021. Pericardial window drain removed on 8/30/21  HTN  HLD  ICM EF 35%     Plan of Treatment:   Continue ASA & Brilinta  Continue Lipitor  Continue lasix 40 IV BID  Hold BB in setting of hypotension. Will resume as HR & BP able to tolerate along with additional GDMT in setting of ICM with EF 35-40%  Continue amiodarone 200 TID, per CV surgery recommendation  Limited 2D ECHO shows no significant pericardial effusion after removal of pericardial window drain. K>4, Mg>2    Judy Douglas MD  Fellow, [address]      Attending Cardiologist Addendum: I have reviewed and performed the history, physical, subjective, objective, assessment, and plan with the student/resident/fellow/APN and agree with the note. I performed the history and physical personally.  I have made changes to the note above as needed. No effusion on limited Echo yesterday  Symptoms improving  Likely will need Rehab     Thank you for allowing me to participate in the care of this patient, please do not hesitate to call if you have any questions. Juliet Stratton DO, [facility] [place], [address], [address], [place] [facility]  [facility]. [facility]  [phone]

## 2021-08-31 NOTE — PLAN OF CARE
Problem: SAFETY  Goal: Free from accidental physical injury  Outcome: Ongoing     Problem: PAIN  Goal: Patient's pain/discomfort is manageable  Outcome: Ongoing     Problem: Pain:  Goal: Pain level will decrease  Description: Pain level will decrease  Outcome: Ongoing  Goal: Control of acute pain  Description: Control of acute pain  Outcome: Ongoing  Goal: Control of chronic pain  Description: Control of chronic pain  Outcome: Ongoing     Problem: Skin Integrity:  Goal: Will show no infection signs and symptoms  Description: Will show no infection signs and symptoms  Outcome: Ongoing  Goal: Absence of new skin breakdown  Description: Absence of new skin breakdown  Outcome: Ongoing     Problem: Falls - Risk of:  Goal: Will remain free from falls  Description: Will remain free from falls  Outcome: Ongoing  Goal: Absence of physical injury  Description: Absence of physical injury  Outcome: Ongoing     Problem: OXYGENATION/RESPIRATORY FUNCTION  Goal: Patient will maintain patent airway  Outcome: Ongoing  Goal: Patient will achieve/maintain normal respiratory rate/effort  Description: Respiratory rate and effort will be within normal limits for the patient  Outcome: Ongoing     Problem: SKIN INTEGRITY  Goal: Skin integrity is maintained or improved  Outcome: Ongoing     Problem: Bleeding:  Goal: Will show no signs and symptoms of excessive bleeding  Description: Will show no signs and symptoms of excessive bleeding  Outcome: Ongoing  Electronically signed by Dc Champion RN on 8/31/2021 at 4:40 PM

## 2021-08-31 NOTE — PLAN OF CARE
Problem: SAFETY  Goal: Free from accidental physical injury  Outcome: Ongoing     Problem: PAIN  Goal: Patient's pain/discomfort is manageable  Outcome: Ongoing     Problem: Pain:  Goal: Pain level will decrease  Description: Pain level will decrease  Outcome: Ongoing  Goal: Control of acute pain  Description: Control of acute pain  Outcome: Ongoing  Goal: Control of chronic pain  Description: Control of chronic pain  Outcome: Ongoing     Problem: Skin Integrity:  Goal: Will show no infection signs and symptoms  Description: Will show no infection signs and symptoms  Outcome: Ongoing  Goal: Absence of new skin breakdown  Description: Absence of new skin breakdown  Outcome: Ongoing     Problem: Falls - Risk of:  Goal: Will remain free from falls  Description: Will remain free from falls  Outcome: Ongoing  Goal: Absence of physical injury  Description: Absence of physical injury  Outcome: Ongoing     Problem: OXYGENATION/RESPIRATORY FUNCTION  Goal: Patient will maintain patent airway  8/30/2021 2254 by Joseph Sanchez RN  Outcome: Ongoing  8/30/2021 1131 by Jake Hodges RN  Outcome: Ongoing  Goal: Patient will achieve/maintain normal respiratory rate/effort  Description: Respiratory rate and effort will be within normal limits for the patient  8/30/2021 2254 by Joseph Sanchez RN  Outcome: Ongoing  8/30/2021 1131 by Jake Hodges RN  Outcome: Ongoing     Problem: SKIN INTEGRITY  Goal: Skin integrity is maintained or improved  Outcome: Ongoing     Problem: Bleeding:  Goal: Will show no signs and symptoms of excessive bleeding  Description: Will show no signs and symptoms of excessive bleeding  Outcome: Ongoing

## 2021-08-31 NOTE — PROGRESS NOTES
[facility]  Internal Medicine Teaching Residency Program  Inpatient Daily Progress Note  ______________________________________________________________________________    Patient: Gwen Fritz  YOB: 1945   NKO:[id number]    Acct: [de-identified]     Room: [address] [place] [address]  Admit date: 8/25/2021  Today's date: 08/31/21  Number of days in the hospital: 5    SUBJECTIVE   Admitting Diagnosis: NSTEMI (non-ST elevated myocardial infarction) ([facility] [address]  CC:  Pt examined at bedside. Chart & results reviewed. Vitals stable  No chest pain  Hemoglobin 7.7  Crackles on chest examination  Continue IV Lasix  Follow Cardio recommends  O2 NC 3 L . SpO2 96%    ROS:  Constitutional:  negative for chills, fevers, sweats  Respiratory:  negative for cough, dyspnea on exertion, hemoptysis, shortness of breath, wheezing  Cardiovascular:  negative for chest pain, chest pressure/discomfort, lower extremity edema, palpitations  Gastrointestinal:  negative for abdominal pain, constipation, diarrhea, nausea, vomiting  Neurological:  negative for dizziness, headache  BRIEF HISTORY     The patient is a pleasant 76 y. o. female presents with a chief complaint of chest pain in the center of the chest, 7/10 in intensity and radiating to the jaw.  Patient received aspirin  and sublingual nitro which decreased her chest pain to 1/10.  She denies any dyspnea or diaphoresis.  Patient has history of coronary artery disease.     The patient denies any fever, cough, lung disease, orthopnea, PND, headache, nasal discharge, abdominal pain, diarrhea, urinary symptoms.     Patient has history of coronary artery disease and undergone coronary artery bypass grafting at age 51, carotid endarterectomy at age 50.  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     Patient takes aspirin 81 mg, Plavix 75 mg daily, atorvastatin 10 mg daily, biotin, diltiazem 180 mg daily, metoprolol 25 mg twice daily and isosorbide 10 mg by mouth twice daily.     On arrival vitals are temp 98.3, heart rate 61, /70 saturating on room air.   Pertinent labs: Sodium 141 potassium 4.7 creatinine 0.93, proBNP 1690, Trop 13, glucose 107, WBC 5.5, Hb 13.4, chest x-ray no acute pulmonary disease and ECG nonspecific T wave abnormality. Her repeat Trop level are 267, 291.     During my examination patient is pain-free, not in any distress, chest is clear without wheezes/crackles, heart sounds normal, no murmurs appreciated, abdomen soft, nontender and no pedal edema.     Patient is diagnosed with NSTEMI, PCI to mid LAD, PT and KVNG proximal and mid LAD complicated by grade 3 perforation causing hemodynamic compromise responded to 2 course terms to seal the perforation, hemodynamic stable now.     Patient developed cardiac tamponade, CT surgery was consulted and pericardial drain was placed in the removal of 200 cc of blood.  Postop TRISHA show LVEF 30%.   Patient started on ASA, Brilinta, Lipitor, Coreg 12.5, Lasix 40 mg twice daily.  Central venous line and arterial line were placed.       OBJECTIVE     Vital Signs:  /76   Pulse 79   Temp 98 °F (36.7 °C) (Oral)   Resp 18   Ht 5' 1\" (1.549 m)   Wt 119 lb 4.3 oz (54.1 kg)   SpO2 96%   BMI 22.54 kg/m²     Temp (24hrs), Av.1 °F (36.7 °C), Min:97.9 °F (36.6 °C), Max:98.6 °F (37 °C)    In: 360   Out: 670 [Urine:670]    Physical Exam:  Constitutional:  alert, oriented, cooperative and in no apparent distress. Head:normocephalic and atraumatic. EENT:  PERRLA. No conjunctival injections. Septum was midline, mucosa was without erythema, exudates or cobblestoning. No thrush was noted. Neck: Supple without thyromegaly. No elevated JVP. Trachea was midline. Respiratory: Chest examination showed bilateral coarse crackles up to mid chest, no egophony no accessory muscle use  Cardiovascular: Regular without murmur, clicks, gallops or rubs.    Abdomen: Slightly rounded and soft without organomegaly. No rebound, rigidity or guarding was appreciated. Lymphatic: No lymphadenopathy. Musculoskeletal: Normal curvature of the spine. No gross muscle weakness. Extremities:  No lower extremity edema, ulcerations, tenderness, varicosities or erythema. Muscle size, tone and strength are normal.  No involuntary movements are noted. Skin:  Warm and dry. Good color, turgor and pigmentation. No lesions or scars.   No cyanosis or clubbing  Neurological/Psychiatric: The patient's general behavior, level of consciousness, thought content and emotional status is normal.        Medications:  Scheduled Medications:    guaiFENesin  600 mg Oral BID    [Held by provider] furosemide  40 mg IntraVENous BID    ticagrelor  90 mg Oral BID    [Held by provider] carvedilol  3.125 mg Oral BID WC    sodium chloride flush  10 mL IntraVENous 2 times per day    aspirin  81 mg Oral Daily    amiodarone  200 mg Oral TID    mupirocin   Nasal BID    polyethylene glycol  17 g Oral Daily    atorvastatin  20 mg Oral Nightly    pantoprazole  40 mg Oral Daily    ipratropium-albuterol  1 ampule Inhalation Q4H WA    insulin glargine  0.15 Units/kg SubCUTAneous Nightly     Continuous Infusions:    norepinephrine Stopped (08/30/21 0450)    dextrose       PRN Medicationssodium chloride flush, 10 mL, PRN  ondansetron, 4 mg, Q8H PRN  acetaminophen, 650 mg, Q4H PRN  oxyCODONE-acetaminophen, 1 tablet, Q4H PRN   Or  oxyCODONE-acetaminophen, 2 tablet, Q4H PRN  fentanNYL, 25 mcg, Q1H PRN   Or  fentanNYL, 50 mcg, Q1H PRN  hydrALAZINE, 5 mg, Q5 Min PRN  metoprolol, 2.5 mg, Q10 Min PRN  sodium bicarbonate, 50 mEq, Q30 Min PRN  diphenhydrAMINE, 25 mg, Nightly PRN  bisacodyl, 5 mg, Daily PRN  fleet, 1 enema, Daily PRN  calcium chloride IVPB, 1,000 mg, PRN  magnesium sulfate, 1,000 mg, PRN  potassium chloride, 20 mEq, PRN  albumin human, 25 g, PRN  glucose, 15 g, PRN  dextrose, 12.5 g, PRN  glucagon (rDNA), 1 mg, PRN  dextrose, 100 mL/hr, PRN        Diagnostic Labs:  CBC:   Recent Labs     08/29/21  0501 08/29/21  0501 08/29/21  1637 08/30/21  0455 08/31/21  0459   WBC 16.9*  --   --  16.0* 9.8   RBC 3.02*  --   --  2.65* 2.47*   HGB 9.4*   < > 9.4* 8.2* 7.7*   HCT 29.4*   < > 29.8* 26.2* 24.8*   MCV 97.4  --   --  98.9 100.4   RDW 13.2  --   --  13.5 13.6     --   --  187 176    < > = values in this interval not displayed. BMP:   Recent Labs     08/29/21  1210 08/30/21  0455 08/31/21  0459    136 135   K 5.1 5.1 4.9   CL 99 100 100   CO2 27 25 25   BUN 20 28* 25*   CREATININE 0.89 1.07* 0.79     BNP: No results for input(s): BNP in the last 72 hours. PT/INR:   Recent Labs     08/29/21  0501 08/30/21  0455 08/31/21  0459   PROTIME 10.7 10.4 10.7   INR 1.0 1.0 1.0     APTT:   Recent Labs     08/28/21  1105 08/28/21  1352   APTT 20.4* 21.7     CARDIAC ENZYMES: No results for input(s): CKMB, CKMBINDEX, TROPONINI in the last 72 hours. Invalid input(s): CKTOTAL;3  FASTING LIPID PANEL:  Lab Results   Component Value Date    CHOL 173 10/28/2015    HDL 70 05/07/2021    TRIG 76 10/28/2015     LIVER PROFILE: No results for input(s): AST, ALT, ALB, BILIDIR, BILITOT, ALKPHOS in the last 72 hours. MICROBIOLOGY:   Lab Results   Component Value Date/Time    CULTURE NO GROWTH 3 DAYS 08/27/2021 04:15 PM       Imaging:    XR ABDOMEN (KUB) (SINGLE AP VIEW)    Result Date: 8/27/2021  Nonspecific bowel gas pattern without evidence for obstruction. XR CHEST PORTABLE    Result Date: 8/30/2021  1. No significant change in central predominant airspace and interstitial opacities, most likely edema (cardiogenic or noncardiogenic) or pneumonia. 2. Persistent trace to small bilateral pleural effusions with underlying bibasilar passive atelectasis. Superimposed pneumonia and aspiration are not excluded especially on the left.      XR CHEST PORTABLE    Result Date: 8/29/2021  Improved aeration in the lung apices, though worsening mid and lower lung field interstitial and alveolar infiltrates with small bilateral effusions. XR CHEST PORTABLE    Result Date: 8/28/2021  Status post open heart surgery; tubes and lines appear satisfactory. Likely pulmonary edema, as discussed above. No pneumothorax or large pleural effusion. XR CHEST PORTABLE    Result Date: 8/27/2021  Chronic pulmonary change with left basilar infiltrate. XR CHEST PORTABLE    Result Date: 8/25/2021  No acute pulmonary disease. Calcific atherosclerotic disease aorta. ASSESSMENT & PLAN     NSTEMI s/p PCI to mid LAD.    · PT and KVNG proximal and mid  LAD, complicated by grade 3 perforation causing hemodynamic compromise. · Responded to 2 covered stents to seal the preparation, hemodynamic stability achieved  · Continue aspirin, Brilinta, Lipitor, low-dose beta-blockers, amiodarone 200 mg TID  · Continue IV Lasix  · Hb 7.7, monitor  · Echocardiography : EF 35% to 40%, no significant pericardial effusion  · Follow cardiology recommendation     CAD s/p CABG:  · S/p PCI to mid LAD  · Continue ASA, Brilinta, Lipitor, low-dose beta-blocker, diuretics     Cardiac Tamponade : Resolved,  Echo shows no significant pericardial effusion        Hypertension: Currently on lower side, continue low-dose beta-blockers Procardia          DVT ppx : SCD       Bin Archer MD  Internal Medicine Resident, PGY-1  [facility];  [place], [place]  8/31/2021, 7:41 AM

## 2021-08-31 NOTE — CONSULTS
[facility] Cardiothoracic Surgery  Consult    Patient's Name/Date of Birth: Roberto Morales / 1945 (20 y.o.)    Date: August 31, 2021     Chief Complaint:   Chief Complaint   Patient presents with    Chest Pain       HPI: Roberto Morales is a 64 y.o.  female who consulted to cardiothoracic surgery after failed pericardial centesis by cardiology. Patient was emergently consulted in the morning due to signs of cardiac tamponade. Patient was rapidly declining. Shortness of breath noted. Average heart rate was about 150 bpm.  Patient has had a history of open heart surgery. Dr. Dom Burgess made aware. Sara Michelleoter her  who is POA called to get consent for emergent pericardial window. Patient has recent history of being on Brilinta and Plavix within 48 hours.   Due to the emergent situation it was warranted that patient be preop for surgery within an hour      ROS:   CONSTITUTIONAL: Mentation was waxing and waning due to shortness of breath, anxiety  Respiratory: Positive-tachypnea  Cardiovascular: Positive for midsternal chest pain  Gastrointestinal: Nauseous  Genitourinary:negative  Hematologic/lymphatic: negative  Musculoskeletal:negative  Neurological: Due to stress of event unable to assess  Endocrine: negative    Past Medical History:   Diagnosis Date    Atherosclerosis     requiring carotid endarterectomy    CAD (coronary artery disease)     Hydatidiform mole     no chemo or radiation    Rosacea      Past Surgical History:   Procedure Laterality Date    CAROTID ENDARTERECTOMY  age 44   Dossie Allison CORONARY ARTERY BYPASS GRAFT  age 45   Dossie Allison [address]    Hydatiform mole pregnancy    PERICARDIUM SURGERY N/A 8/28/2021    SUB-XYPHOID WINDOW,PERICARDIAL DRAINAGE, STERNAL WIRE REMOVAL performed by Colton Magana MD [address]  age 8     Allergies   Allergen Reactions    Aspirin-Acetaminophen-Caffeine      Unsure of what the exact migraine med was Family History   Problem Relation Age of Onset    Other Paternal Grandfather         CAD    Other Father         CAD, brain aneurysm    Other Mother         asthma, arthritis, thyroid disease    Other Sister         breast cancer, bowel disease     Social History     Socioeconomic History    Marital status:      Spouse name: Not on file    Number of children: Not on file    Years of education: Not on file    Highest education level: Not on file   Occupational History    Not on file   Tobacco Use    Smoking status: Never Smoker    Smokeless tobacco: Never Used   Substance and Sexual Activity    Alcohol use: No    Drug use: No    Sexual activity: Yes     Partners: Male   Other Topics Concern    Not on file   Social History Narrative    Not on file     Social Determinants of Health     Financial Resource Strain:     Difficulty of Paying Living Expenses:    Food Insecurity:     Worried About Running Out of Food in the Last Year:     [address] in the Last Year:    Transportation Needs:     Lack of Transportation (Medical):      Lack of Transportation (Non-Medical):    Physical Activity:     Days of Exercise per Week:     Minutes of Exercise per Session:    Stress:     Feeling of Stress :    Social Connections:     Frequency of Communication with Friends and Family:     Frequency of Social Gatherings with Friends and Family:     Attends [employer] Services:     Active Member of Clubs or Organizations:     Attends Club or Organization Meetings:     Marital Status:    Intimate Partner Violence:     Fear of Current or Ex-Partner:     Emotionally Abused:     Physically Abused:     Sexually Abused:        Current Facility-Administered Medications   Medication Dose Route Frequency Provider Last Rate Last Admin    guaiFENesin ([address]) extended release tablet 600 mg  600 mg Oral BID ZACH Parks CNP   600 mg at 08/31/21 0806    norepinephrine (LEVOPHED) 16 mg in sodium chloride 0.9 % 250 mL infusion  2-30 mcg/min IntraVENous Continuous Mireille Carbajal MD   Stopped at 08/30/21 0450    furosemide (LASIX) injection 40 mg  40 mg IntraVENous BID Mireille Carbajal MD        ticagrelor (BRILINTA) tablet 90 mg  90 mg Oral BID Mireille Carbajal MD   90 mg at 08/31/21 0806    [Held by provider] carvedilol (COREG) tablet 3.125 mg  3.125 mg Oral BID WC Mireille Carbajal MD        sodium chloride flush 0.9 % injection 10 mL  10 mL IntraVENous 2 times per day Sisto Fallen, APRN - NP   10 mL at 08/31/21 0806    sodium chloride flush 0.9 % injection 10 mL  10 mL IntraVENous PRN Sisto Fallen, APRN - NP        ondansetron [facility]) injection 4 mg  4 mg IntraVENous Q8H PRN Sisto Fallen, APRN - NP        aspirin EC tablet 81 mg  81 mg Oral Daily Sisto Fallen, APRN - NP   81 mg at 08/31/21 0806    acetaminophen (TYLENOL) tablet 650 mg  650 mg Oral Q4H PRN Sisto Fallen, APRN - NP   650 mg at 08/30/21 0556    oxyCODONE-acetaminophen (PERCOCET) 5-325 MG per tablet 1 tablet  1 tablet Oral Q4H PRN Sisto Fallen, APRN - NP   1 tablet at 08/31/21 8360    Or    oxyCODONE-acetaminophen (PERCOCET) 5-325 MG per tablet 2 tablet  2 tablet Oral Q4H PRN Sisto Fallen, APRN - NP   2 tablet at 08/28/21 1345    fentaNYL (SUBLIMAZE) injection 25 mcg  25 mcg IntraVENous Q1H PRN Sisto Fallen, APRN - NP        Or    fentaNYL (SUBLIMAZE) injection 50 mcg  50 mcg IntraVENous Q1H PRN Sisto Fallen, APRN - NP   50 mcg at 08/28/21 1345    amiodarone (CORDARONE) tablet 200 mg  200 mg Oral TID Sisto Fallen, APRN - NP   200 mg at 08/31/21 0806    hydrALAZINE (APRESOLINE) injection 5 mg  5 mg IntraVENous Q5 Min PRN Sisto Fallen, APRN - NP        metoprolol (LOPRESSOR) injection 2.5 mg  2.5 mg IntraVENous Q10 Min PRN ZACH Cabrera NP        mupirocin (BACTROBAN) 2 % ointment   Nasal BID ZACH Cabrera NP   Given at 08/30/21 2015    sodium bicarbonate 8.4 % injection 50 mEq  50 mEq IntraVENous Q30 Min PRN Pricila Willy, APRN - NP        diphenhydrAMINE (BENADRYL) tablet 25 mg  25 mg Oral Nightly PRN Pricila Aguilera, APRN - NP        polyethylene glycol (GLYCOLAX) packet 17 g  17 g Oral Daily Pricila Willy, APRN - NP   17 g at 08/31/21 4362    bisacodyl (DULCOLAX) EC tablet 5 mg  5 mg Oral Daily PRN Pricila Aguilera, APRN - NP   5 mg at 08/28/21 2040    fleet rectal enema 1 enema  1 enema Rectal Daily PRN Pricila Aguilera, APRN - NP        atorvastatin (LIPITOR) tablet 20 mg  20 mg Oral Nightly Pricila Willy, APRN - NP   20 mg at 08/30/21 2117    pantoprazole (PROTONIX) tablet 40 mg  40 mg Oral Daily Pricila iWlly, APRN - NP   40 mg at 08/31/21 0806    calcium chloride 1,000 mg in sodium chloride 0.9 % 100 mL IVPB  1,000 mg IntraVENous PRN Pricila Aguilera, APRN - NP        magnesium sulfate 1000 mg in dextrose 5% 100 mL IVPB  1,000 mg IntraVENous PRN Pricila Aguilera, APRN - NP   Stopped at 08/29/21 0344    potassium chloride 20 mEq/50 mL IVPB (Central Line)  20 mEq IntraVENous PRN Pricila Aguilera, APRN - NP 25 mL/hr at 08/29/21 0754 20 mEq at 08/29/21 0754    ipratropium-albuterol (DUONEB) nebulizer solution 1 ampule  1 ampule Inhalation Q4H WA Pricila Aguilera, APRN - NP   1 ampule at 08/30/21 2015    albumin human 5 % IV solution 25 g  25 g IntraVENous PRN Pricila Aguilera, APRN - NP   Stopped at 08/28/21 1440    insulin glargine (LANTUS) injection vial 7 Units  0.15 Units/kg SubCUTAneous Nightly Pricila Aguilera, APRN - NP        glucose (GLUTOSE) 40 % oral gel 15 g  15 g Oral PRN Pricila Aguilera, APRN - NP        dextrose 50 % IV solution  12.5 g IntraVENous PRN Pricila Aguilera, APRN - NP        glucagon (rDNA) injection 1 mg  1 mg IntraMUSCular PRN Pricila Aguilera APRN - NP        dextrose 5 % solution  100 mL/hr IntraVENous PRN ZACH Mcintosh - JOSE A           Physical Exam:  Vitals:    08/31/21 0900   BP: 116/68 Pulse: 88   Resp: 19   Temp:    SpO2: 96%     Weight: Weight: 119 lb 4.3 oz (54.1 kg)    Weight: 112 lb (50.8 kg)        General: Mentation was waxing and waning due to acute event was unable to answer questions or follow commands  HEENT:  Normocephalic and atraumatic. PERRL. EOMI. Lips and oral mucosa moist and without lesions. Neck:  Supple. Trachea midline. Chest:  No abnormality. Equal and symmetric expansion with respiration. Lungs: Clear. Breath sounds tachypneic. Cardiac: Tachycardic heart rate 160  Abdomen:  Soft, non-tender, normoactive bowel sounds. No masses or organomegaly. Extremities:  No cyanosis, clubbing, or edema. Intact pulses in all four extremities. Musculoskeletal:  Intact range of motion of peripheral joints. Normal muscular strength. Neurologic: Unable to examine  Psychiatric: Mood and affect are appropriate. Imaging Studies:    Cardiac Cath: Unable to review due to urgency of surgery. Dr. David Casas made aware of cardiac catheterization by cardiology    Echo:Global left ventricular systolic function appears moderately reduced. Estimated ejection fraction is 35-40 % . Abnormal motion of the  anterior and lateral wall of the left ventricle. Large loculated pericardial effusion is seen mostly posteriorly near the left ventricle. There appears to be some compression of the left  ventricle noted from the pocket of fluid.     CT: NA    Assessment & Plan:  Patient Active Problem List   Diagnosis    Hydatidiform mole    Chest pain    NSTEMI (non-ST elevated myocardial infarction) ([facility] [address]    Pericardial effusion    Intractable nausea and vomiting       Risks Reviewed w/pt Chris Travis regarding a pericardial window  There is a small risk of death  Higher risk of bleeding due to Brilinta and Plavix  Risk of worsening heart function  Risk of reaccumulation of fluid  Risk of damage to the heart  Risk of cardiac arrhythmias that could be lethal    Plan:  Stat type and cross  Transfuse 3 units of platelets stat  I called Neil Angela to get informed consent  Call OR in preparation for emergent surgery with Dr. Brooklynn Chang  Vancomycin ordered stat    I spent 1 hour with the patient until she went up to surgery    The above recommendations including medications and orders were discussed and agreed upon with Dr. Brooklynn Chang the attending on service for the cardiothoracic surgery group today.      ZACH Slade - NP, CNP  Phone: [phone]

## 2021-09-01 ENCOUNTER — APPOINTMENT (OUTPATIENT)
Dept: GENERAL RADIOLOGY | Age: 76
DRG: 270 | End: 2021-09-01
Payer: MEDICARE

## 2021-09-01 LAB
ABO/RH: NORMAL
ANION GAP SERPL CALCULATED.3IONS-SCNC: 13 MMOL/L (ref 9–17)
ANTIBODY SCREEN: NEGATIVE
ARM BAND NUMBER: NORMAL
BLD PROD TYP BPU: NORMAL
BUN BLDV-MCNC: 20 MG/DL (ref 8–23)
BUN/CREAT BLD: ABNORMAL (ref 9–20)
CALCIUM SERPL-MCNC: 8.7 MG/DL (ref 8.6–10.4)
CHLORIDE BLD-SCNC: 95 MMOL/L (ref 98–107)
CO2: 29 MMOL/L (ref 20–31)
CREAT SERPL-MCNC: 0.97 MG/DL (ref 0.5–0.9)
CROSSMATCH RESULT: NORMAL
DISPENSE STATUS BLOOD BANK: NORMAL
EXPIRATION DATE: NORMAL
GFR AFRICAN AMERICAN: >60 ML/MIN
GFR NON-AFRICAN AMERICAN: 56 ML/MIN
GFR SERPL CREATININE-BSD FRML MDRD: ABNORMAL ML/MIN/{1.73_M2}
GFR SERPL CREATININE-BSD FRML MDRD: ABNORMAL ML/MIN/{1.73_M2}
GLUCOSE BLD-MCNC: 113 MG/DL (ref 70–99)
GLUCOSE BLD-MCNC: 140 MG/DL (ref 65–105)
HCT VFR BLD CALC: 28.9 % (ref 36.3–47.1)
HEMOGLOBIN: 9 G/DL (ref 11.9–15.1)
INR BLD: 1
MAGNESIUM: 2.1 MG/DL (ref 1.6–2.6)
MCH RBC QN AUTO: 30.5 PG (ref 25.2–33.5)
MCHC RBC AUTO-ENTMCNC: 31.1 G/DL (ref 28.4–34.8)
MCV RBC AUTO: 98 FL (ref 82.6–102.9)
NRBC AUTOMATED: 0 PER 100 WBC
OSMOLALITY URINE: 339 MOSM/KG (ref 80–1300)
PDW BLD-RTO: 13.3 % (ref 11.8–14.4)
PLATELET # BLD: 231 K/UL (ref 138–453)
PMV BLD AUTO: 11.6 FL (ref 8.1–13.5)
POTASSIUM SERPL-SCNC: 4.2 MMOL/L (ref 3.7–5.3)
PROTHROMBIN TIME: 10.6 SEC (ref 9.1–12.3)
RBC # BLD: 2.95 M/UL (ref 3.95–5.11)
SODIUM BLD-SCNC: 137 MMOL/L (ref 135–144)
TRANSFUSION STATUS: NORMAL
UNIT DIVISION: 0
UNIT NUMBER: NORMAL
WBC # BLD: 9.7 K/UL (ref 3.5–11.3)

## 2021-09-01 PROCEDURE — 80048 BASIC METABOLIC PNL TOTAL CA: CPT

## 2021-09-01 PROCEDURE — 2100000001 HC CVICU R&B

## 2021-09-01 PROCEDURE — 94761 N-INVAS EAR/PLS OXIMETRY MLT: CPT

## 2021-09-01 PROCEDURE — 6370000000 HC RX 637 (ALT 250 FOR IP): Performed by: STUDENT IN AN ORGANIZED HEALTH CARE EDUCATION/TRAINING PROGRAM

## 2021-09-01 PROCEDURE — 97530 THERAPEUTIC ACTIVITIES: CPT

## 2021-09-01 PROCEDURE — 36415 COLL VENOUS BLD VENIPUNCTURE: CPT

## 2021-09-01 PROCEDURE — 71045 X-RAY EXAM CHEST 1 VIEW: CPT

## 2021-09-01 PROCEDURE — 99232 SBSQ HOSP IP/OBS MODERATE 35: CPT | Performed by: INTERNAL MEDICINE

## 2021-09-01 PROCEDURE — 85027 COMPLETE CBC AUTOMATED: CPT

## 2021-09-01 PROCEDURE — 6370000000 HC RX 637 (ALT 250 FOR IP): Performed by: NURSE PRACTITIONER

## 2021-09-01 PROCEDURE — 2580000003 HC RX 258: Performed by: NURSE PRACTITIONER

## 2021-09-01 PROCEDURE — 6360000002 HC RX W HCPCS: Performed by: NURSE PRACTITIONER

## 2021-09-01 PROCEDURE — 2700000000 HC OXYGEN THERAPY PER DAY

## 2021-09-01 PROCEDURE — 83735 ASSAY OF MAGNESIUM: CPT

## 2021-09-01 PROCEDURE — 97116 GAIT TRAINING THERAPY: CPT

## 2021-09-01 PROCEDURE — 82947 ASSAY GLUCOSE BLOOD QUANT: CPT

## 2021-09-01 PROCEDURE — 94640 AIRWAY INHALATION TREATMENT: CPT

## 2021-09-01 PROCEDURE — 97110 THERAPEUTIC EXERCISES: CPT

## 2021-09-01 PROCEDURE — 83935 ASSAY OF URINE OSMOLALITY: CPT

## 2021-09-01 PROCEDURE — 85610 PROTHROMBIN TIME: CPT

## 2021-09-01 PROCEDURE — 6360000002 HC RX W HCPCS: Performed by: STUDENT IN AN ORGANIZED HEALTH CARE EDUCATION/TRAINING PROGRAM

## 2021-09-01 PROCEDURE — 94668 MNPJ CHEST WALL SBSQ: CPT

## 2021-09-01 RX ORDER — SPIRONOLACTONE 25 MG/1
25 TABLET ORAL DAILY
Status: DISCONTINUED | OUTPATIENT
Start: 2021-09-01 | End: 2021-09-03 | Stop reason: HOSPADM

## 2021-09-01 RX ORDER — FUROSEMIDE 10 MG/ML
40 INJECTION INTRAMUSCULAR; INTRAVENOUS DAILY
Status: DISCONTINUED | OUTPATIENT
Start: 2021-09-02 | End: 2021-09-02

## 2021-09-01 RX ADMIN — OXYCODONE HYDROCHLORIDE AND ACETAMINOPHEN 1 TABLET: 5; 325 TABLET ORAL at 01:57

## 2021-09-01 RX ADMIN — ONDANSETRON 4 MG: 2 INJECTION INTRAMUSCULAR; INTRAVENOUS at 20:37

## 2021-09-01 RX ADMIN — MUPIROCIN: 20 OINTMENT TOPICAL at 08:14

## 2021-09-01 RX ADMIN — METOPROLOL TARTRATE 12.5 MG: 25 TABLET ORAL at 10:24

## 2021-09-01 RX ADMIN — ASPIRIN 81 MG: 81 TABLET, COATED ORAL at 08:14

## 2021-09-01 RX ADMIN — SODIUM CHLORIDE, PRESERVATIVE FREE 10 ML: 5 INJECTION INTRAVENOUS at 08:14

## 2021-09-01 RX ADMIN — IPRATROPIUM BROMIDE AND ALBUTEROL SULFATE 1 AMPULE: .5; 2.5 SOLUTION RESPIRATORY (INHALATION) at 19:40

## 2021-09-01 RX ADMIN — FUROSEMIDE 40 MG: 10 INJECTION, SOLUTION INTRAMUSCULAR; INTRAVENOUS at 08:14

## 2021-09-01 RX ADMIN — POLYETHYLENE GLYCOL 3350 17 G: 17 POWDER, FOR SOLUTION ORAL at 08:08

## 2021-09-01 RX ADMIN — AMIODARONE HYDROCHLORIDE 200 MG: 200 TABLET ORAL at 19:48

## 2021-09-01 RX ADMIN — TICAGRELOR 90 MG: 90 TABLET ORAL at 19:48

## 2021-09-01 RX ADMIN — AMIODARONE HYDROCHLORIDE 200 MG: 200 TABLET ORAL at 14:29

## 2021-09-01 RX ADMIN — ACETAMINOPHEN 650 MG: 325 TABLET ORAL at 20:46

## 2021-09-01 RX ADMIN — PANTOPRAZOLE SODIUM 40 MG: 40 TABLET, DELAYED RELEASE ORAL at 08:14

## 2021-09-01 RX ADMIN — IPRATROPIUM BROMIDE AND ALBUTEROL SULFATE 1 AMPULE: .5; 2.5 SOLUTION RESPIRATORY (INHALATION) at 12:25

## 2021-09-01 RX ADMIN — METOPROLOL TARTRATE 12.5 MG: 25 TABLET ORAL at 19:48

## 2021-09-01 RX ADMIN — ACETAMINOPHEN 650 MG: 325 TABLET ORAL at 15:47

## 2021-09-01 RX ADMIN — SODIUM CHLORIDE, PRESERVATIVE FREE 10 ML: 5 INJECTION INTRAVENOUS at 19:48

## 2021-09-01 RX ADMIN — IPRATROPIUM BROMIDE AND ALBUTEROL SULFATE 1 AMPULE: .5; 2.5 SOLUTION RESPIRATORY (INHALATION) at 09:17

## 2021-09-01 RX ADMIN — ACETAMINOPHEN 650 MG: 325 TABLET ORAL at 11:48

## 2021-09-01 RX ADMIN — GUAIFENESIN 600 MG: 600 TABLET, EXTENDED RELEASE ORAL at 19:48

## 2021-09-01 RX ADMIN — GUAIFENESIN 600 MG: 600 TABLET, EXTENDED RELEASE ORAL at 08:14

## 2021-09-01 RX ADMIN — AMIODARONE HYDROCHLORIDE 200 MG: 200 TABLET ORAL at 08:14

## 2021-09-01 RX ADMIN — IPRATROPIUM BROMIDE AND ALBUTEROL SULFATE 1 AMPULE: .5; 2.5 SOLUTION RESPIRATORY (INHALATION) at 16:27

## 2021-09-01 RX ADMIN — ATORVASTATIN CALCIUM 20 MG: 20 TABLET, FILM COATED ORAL at 19:48

## 2021-09-01 RX ADMIN — TICAGRELOR 90 MG: 90 TABLET ORAL at 08:14

## 2021-09-01 ASSESSMENT — PAIN SCALES - GENERAL
PAINLEVEL_OUTOF10: 3
PAINLEVEL_OUTOF10: 5
PAINLEVEL_OUTOF10: 3
PAINLEVEL_OUTOF10: 3
PAINLEVEL_OUTOF10: 0

## 2021-09-01 ASSESSMENT — ENCOUNTER SYMPTOMS
SHORTNESS OF BREATH: 0
WHEEZING: 0
EYE REDNESS: 0
SORE THROAT: 0
NAUSEA: 0
CONSTIPATION: 0
BACK PAIN: 0
COUGH: 0
CHOKING: 0
PHOTOPHOBIA: 0
FACIAL SWELLING: 0
VOMITING: 0
ABDOMINAL PAIN: 0

## 2021-09-01 NOTE — PROGRESS NOTES
Physical Therapy  Facility/Department: [facility] CAR 1  Daily Treatment Note  NAME: Mariama Olmstead  : 1945  MRN: [id number]    Date of Service: 2021    Discharge Recommendations: Further therapy recommended at discharge. Patient would benefit from continued therapy after discharge   PT Equipment Recommendations  Equipment Needed: No  Other: Pt has rolling walker. Assessment   Body structures, Functions, Activity limitations: Decreased functional mobility ; Decreased endurance;Decreased strength  Assessment: Pt amb 24' using RW x modA. Pt becomes light headed and nauseous t/o amb requiring frequent rest breaks. Pt will benefit from continued therapy to address deficits. Prognosis: Good  Decision Making: Medium Complexity  REQUIRES PT FOLLOW UP: Yes  Activity Tolerance  Activity Tolerance: Patient limited by fatigue;Patient limited by endurance     Patient Diagnosis(es): The primary encounter diagnosis was Chest pain, unspecified type. A diagnosis of NSTEMI (non-ST elevated myocardial infarction) [facility]) was also pertinent to this visit. has a past medical history of Atherosclerosis, CAD (coronary artery disease), Hydatidiform mole, and Rosacea. has a past surgical history that includes Carotid endarterectomy (age 44); Coronary artery bypass graft (age 45); Tonsillectomy and adenoidectomy (age 8); Dilation & curettage (); and Pericardium surgery (N/A, 2021).     Restrictions  Restrictions/Precautions  Restrictions/Precautions: Up as Tolerated, Surgical Protocols, Cardiac, Fall Risk  Required Braces or Orthoses?: No  Position Activity Restriction  Sternal Precautions: No Pushing, No Pulling, 5# Lifting Restrictions  Other position/activity restrictions: ambulate patient  Subjective   General  Chart Reviewed: Yes  Family / Caregiver Present: Yes (christina)  Subjective  Subjective: RN and pt agreeable to PT this AM. Pt supine in bed upon arrivial, reports feeling weak, was pleasant and cooperative t/o.  General Comment  Comments: Pt supine in bed on 5L 02. Sp02 at 98%  Pain Screening  Patient Currently in Pain: No  Vital Signs  Patient Currently in Pain: No       Orientation  Orientation  Overall Orientation Status: Within Functional Limits  Cognition      Objective   Bed mobility  Supine to Sit: Moderate assistance  Scooting: Minimal assistance  Comment: Pt requires min cueing to comeplet BM. Transfers  Sit to Stand: Moderate Assistance  Stand to sit: Moderate Assistance  Comment: Pt becomes light headed upon standing up, relieves with a standing rest break. Ambulation  Ambulation?: Yes  Ambulation 1  Surface: level tile  Device: Rolling Walker  Other Apparatus: O2 (3L)  Assistance: Maximum assistance  Quality of Gait: slow ad cautious unsteady  Gait Deviations: Slow Crystal;Decreased step length;Decreased step height  Distance: 24'  Comments: Pt becomes light headed with a couple LOB during amb. Pt becomes nausea and requires frequent rest breaks. Stairs/Curb  Stairs?: No     Balance  Posture: Fair  Sitting - Static: Good;-  Sitting - Dynamic: Fair;+  Standing - Static: Fair;+  Standing - Dynamic: Fair  Comments: Standing with rolling walker   Exercises: Seated LE exercise program: Long Arc Quads, hip abduction/adduction, heel/toe raises, and marches. Reps: 10x for 2 sets pf each. AM-PAC Score  AM-PAC Inpatient Mobility Raw Score : 14 (09/01/21 1058)  AM-PAC Inpatient T-Scale Score : 38.1 (09/01/21 1058)  Mobility Inpatient CMS 0-100% Score: 61.29 (09/01/21 1058)  Mobility Inpatient CMS G-Code Modifier : CL (09/01/21 1058)   Goals  Short term goals  Time Frame for Short term goals: 14 visits  Short term goal 1: Independent bed mobility  Short term goal 2: Independent transfers  Short term goal 3:  Independent ambulation with least restricitve device 200 ft  Short term goal 4: Pt to navigate 12 stairs SBA  Short term goal 5: Pt to tolerate 30 minutes of activity to improve endurance  and safety with mobility. Patient Goals   Patient goals : Get stronger    Plan    Plan  Times per week: 6x/week  Times per day: Daily  Current Treatment Recommendations: Transfer Training, Endurance Training, Strengthening, Gait Training, Functional Mobility Training, Stair training, Safety Education & Training  Safety Devices  Type of devices: Call light within reach, Left in chair, Nurse notified     Therapy Time   Individual Concurrent Group Co-treatment   Time In          Time Out [phone] (started at [phone] to [phone]. came back in at [phone])         Minutes 44         Timed Code Treatment Minutes: 44 Minutes    Treatment performed by Student PTA under the supervision of co-signing PTA who agrees with all treatment and documentation. Pro Thomas PTA. Bryan Diaz SPTA.

## 2021-09-01 NOTE — PROGRESS NOTES
Writer paged Dr. Osbaldo Meyers from [facility] about giving lasix this morning and she said to ask cardiology to clarify the medication.

## 2021-09-01 NOTE — PLAN OF CARE
Problem: OXYGENATION/RESPIRATORY FUNCTION  Goal: Patient will maintain patent airway  8/31/2021 2230 by Shira Murray RCP  Outcome: Ongoing     Problem: OXYGENATION/RESPIRATORY FUNCTION  Goal: Patient will achieve/maintain normal respiratory rate/effort  Description: Respiratory rate and effort will be within normal limits for the patient  8/31/2021 2230 by Shira Murray RCP  Outcome: Ongoing   BRONCHOSPASM/BRONCHOCONSTRICTION     [x]         IMPROVE AERATION/BREATH SOUNDS  [x]   ADMINISTER BRONCHODILATOR THERAPY AS APPROPRIATE  [x]   ASSESS BREATH SOUNDS  []   IMPLEMENT AEROSOL/MDI PROTOCOL  [x]   PATIENT EDUCATION AS NEEDED   ATELECTASIS     [x]  PREVENT ATELECTASIS  [x]   ASSESS BREATH SOUNDS  []   IMPLEMENT HYPERINFLATION PROTOCOL  [x]  INCENTIVE SPIROMETRY INSTRUCTION  [x]   PATIENT EDUCATION AS NEEDED   PATIENT REFUSES TO WEAR BIPAP     [x] Risks and benefits explained to patient   [x] Patient refuses to wear Bipap stating \"no\"  [x] Patient verbalizes understanding of information presented.

## 2021-09-01 NOTE — PLAN OF CARE
Problem: SAFETY  Goal: Free from accidental physical injury  8/31/2021 2043 by Quincy Enriquez RN  Outcome: Ongoing  8/31/2021 1639 by Jonas Gordon RN  Outcome: Ongoing     Problem: PAIN  Goal: Patient's pain/discomfort is manageable  8/31/2021 2043 by Quincy Enriquez RN  Outcome: Ongoing  8/31/2021 1639 by Jonas Gordon RN  Outcome: Ongoing     Problem: Pain:  Goal: Pain level will decrease  Description: Pain level will decrease  8/31/2021 2043 by Quincy Enriquez RN  Outcome: Ongoing  8/31/2021 1639 by Jonas Gordon RN  Outcome: Ongoing  Goal: Control of acute pain  Description: Control of acute pain  8/31/2021 2043 by Quincy Enriquez RN  Outcome: Ongoing  8/31/2021 1639 by Jonas Gordon RN  Outcome: Ongoing  Goal: Control of chronic pain  Description: Control of chronic pain  8/31/2021 2043 by Quincy Enriquez RN  Outcome: Ongoing  8/31/2021 1639 by Jonas Gordon RN  Outcome: Ongoing     Problem: Skin Integrity:  Goal: Will show no infection signs and symptoms  Description: Will show no infection signs and symptoms  8/31/2021 2043 by Quincy Enriquez RN  Outcome: Ongoing  8/31/2021 1639 by Jonas Gordon RN  Outcome: Ongoing  Goal: Absence of new skin breakdown  Description: Absence of new skin breakdown  8/31/2021 2043 by Quincy Enriquez RN  Outcome: Ongoing  8/31/2021 1639 by Jonas Gordon RN  Outcome: Ongoing     Problem: Falls - Risk of:  Goal: Will remain free from falls  Description: Will remain free from falls  8/31/2021 2043 by Quincy Enriquez RN  Outcome: Ongoing  8/31/2021 1639 by Jonas Gordon RN  Outcome: Ongoing  Goal: Absence of physical injury  Description: Absence of physical injury  8/31/2021 2043 by Quincy Enriquez RN  Outcome: Ongoing  8/31/2021 1639 by Jonas Gordon RN  Outcome: Ongoing     Problem: OXYGENATION/RESPIRATORY FUNCTION  Goal: Patient will maintain patent airway  8/31/2021 2043 by Quincy Enriquez RN  Outcome: Ongoing  8/31/2021 1639 by Jonas Gordon RN  Outcome:

## 2021-09-01 NOTE — PLAN OF CARE
Problem: SAFETY  Goal: Free from accidental physical injury  Outcome: Ongoing     Problem: PAIN  Goal: Patient's pain/discomfort is manageable  Outcome: Ongoing     Problem: Pain:  Goal: Pain level will decrease  Description: Pain level will decrease  Outcome: Ongoing  Goal: Control of acute pain  Description: Control of acute pain  Outcome: Ongoing  Goal: Control of chronic pain  Description: Control of chronic pain  Outcome: Ongoing     Problem: Skin Integrity:  Goal: Will show no infection signs and symptoms  Description: Will show no infection signs and symptoms  Outcome: Ongoing  Goal: Absence of new skin breakdown  Description: Absence of new skin breakdown  Outcome: Ongoing     Problem: Falls - Risk of:  Goal: Will remain free from falls  Description: Will remain free from falls  Outcome: Ongoing  Goal: Absence of physical injury  Description: Absence of physical injury  Outcome: Ongoing     Problem: OXYGENATION/RESPIRATORY FUNCTION  Goal: Patient will maintain patent airway  Outcome: Ongoing  Goal: Patient will achieve/maintain normal respiratory rate/effort  Description: Respiratory rate and effort will be within normal limits for the patient  Outcome: Ongoing     Problem: SKIN INTEGRITY  Goal: Skin integrity is maintained or improved  Outcome: Ongoing     Problem: Bleeding:  Goal: Will show no signs and symptoms of excessive bleeding  Description: Will show no signs and symptoms of excessive bleeding  Outcome: Ongoing   Electronically signed by Mike Forbes RN on 9/1/2021 at 6:31 PM

## 2021-09-01 NOTE — CARE COORDINATION
[facility] would [facility] her to go to [facility].  Referral sent

## 2021-09-01 NOTE — PROGRESS NOTES
[facility]  Internal Medicine Teaching Residency Program  Inpatient Daily Progress Note  ______________________________________________________________________________    Patient: Collette Landau  YOB: 1945   PW    Acct: [de-identified]     Room: [address] [place] [address]-  Admit date: 2021  Today's date: 21  Number of days in the hospital: 6    SUBJECTIVE   Admitting Diagnosis: NSTEMI (non-ST elevated myocardial infarction) [facility])     CC: Chest pain    Pt examined at bedside. Chart & results reviewed. No acute events overnight. She remained afebrile, hemodynamically stable, saturating >90% on 2L NC oxygen. Off pressors since . BP: 100s-130s/60s-70s  HR: 80s-90s  Uout: 5.7 L/24 hrs  Cardiology following; switched Lasix 40 mg IV daily. Patient received 1 dose of Lasix 40 mg IV this morning. ROS:  Review of Systems   Constitutional: Positive for fatigue. Negative for chills, diaphoresis and fever. HENT: Negative for facial swelling, nosebleeds, sneezing and sore throat. Eyes: Negative for photophobia, redness and visual disturbance. Respiratory: Negative for cough, choking, shortness of breath and wheezing. Cardiovascular: Negative for chest pain, palpitations and leg swelling. Gastrointestinal: Negative for abdominal pain, constipation, nausea and vomiting. Endocrine: Positive for polyuria. Negative for polydipsia and polyphagia. Genitourinary: Negative for dysuria, frequency, hematuria and urgency. Musculoskeletal: Negative for arthralgias, back pain and myalgias. Neurological: Negative for dizziness, tremors, numbness and headaches. Psychiatric/Behavioral: Negative for agitation, behavioral problems and confusion. BRIEF HISTORY     The patient is a pleasant 64 y.o. female presents with a chief complaint of chest pain in the center of the chest, 7/10 in intensity and radiating to the jaw.   Patient received aspirin  and sublingual nitro which decreased her chest pain to 1/10. She denies any dyspnea or diaphoresis. Patient has history of coronary artery disease.     The patient denies any fever, cough, lung disease, orthopnea, PND, headache, nasal discharge, abdominal pain, diarrhea, urinary symptoms.     Patient has history of coronary artery disease and undergone coronary artery bypass grafting at age 45, carotid endarterectomy at age 44. 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     Patient takes aspirin 81 mg, Plavix 75 mg daily, atorvastatin 10 mg daily, biotin, diltiazem 180 mg daily, metoprolol 25 mg twice daily and isosorbide 10 mg by mouth twice daily.       OBJECTIVE     Vital Signs:  /76   Pulse 90   Temp 98 °F (36.7 °C) (Oral)   Resp 20   Ht 5' 1\" (1.549 m)   Wt 117 lb 4.6 oz (53.2 kg)   SpO2 98%   BMI 22.16 kg/m²     Temp (24hrs), Av °F (36.7 °C), Min:97.8 °F (36.6 °C), Max:98.1 °F (36.7 °C)    In: -   Out: 5700 [Urine:5700]    Physical Exam:  Constitutional: This is a well developed, well nourished, 18.5-24.9 - Normal 64y.o. year old female who is alert, oriented, cooperative and in no apparent distress. Head:normocephalic and atraumatic. EENT:  PERRLA. No conjunctival injections. No thrush was noted. Neck: Supple without thyromegaly. No elevated JVP. Trachea was midline. Respiratory: Chest was symmetrical without dullness to percussion. Breath sounds bilaterally were clear to auscultation. There were no wheezes, rhonchi or rales. There is no intercostal retraction or use of accessory muscles. No egophony noted. Cardiovascular: Regular without murmur, clicks, gallops or rubs. Abdomen: Slightly rounded and soft without organomegaly. No rebound, rigidity or guarding was appreciated. Lymphatic: No lymphadenopathy. Musculoskeletal:  No gross muscle weakness.     Extremities:  No lower extremity edema, ulcerations, tenderness, varicosities or infiltrate. XR CHEST PORTABLE    Result Date: 8/25/2021  No acute pulmonary disease. Calcific atherosclerotic disease aorta. ASSESSMENT & PLAN     ASSESSMENT / PLAN:     NSTEMI (non-ST elevated myocardial infarction) (HCC)  - Continue ASA and Brilinta  - Continue Lipitor  - Change to Lasix 40 mg IV daily per Cardiology; Uout 5.7 L/24hr  - Start Lopressor 12.5 mg BID for LVEF 35-40% per Cardiology; hold for HR<60 and SBP <110  - Cardiology following, will follow up recommendations        CAD s/p CABG (about 20 years ago)  - Continue ASA and Brilinta  - Continue Lipitor  - Continue Lasix 40 mg IV BID  - Continue   - Cardiology following      Cardiac tamponade- resolved  - Initial Echo on presentation showed large pericardial effusion which was squeezing on the left ventricle and believed to be causing left ventricular tamponade physiology  - s/p pericardial window and subsequent removal by CT surgery  - Repeat Echo on 08/30 after removal of pericardial window shows no Pericardial effusion and LVEF 35-40%  - Continue Amiodarone 200 TID per CT surgery recommendation  - Cardiology following          DVT ppx : SCD  GI ppx: Protonix    PT/OT: On board  Discharge Planning / SW: [facility]- accepted the patient, will need Covid swab before DC. El Couch MD  Internal Medicine Resident, PGY-1  [facility];  [place], [place]  9/1/2021, 7:10 AM

## 2021-09-01 NOTE — CARE COORDINATION
Transitional planning-talked with  [facility] Butler as first choice, then [employer] faxed and left message. Not wanting [facility]-called and talked with Melina Dorado at [facility] intake. 1515 call from Sreedhar Booth at [facility] for [facility]. Needs COVID test 48Hours before going.

## 2021-09-01 NOTE — PROGRESS NOTES
[facility]   Progress Note                 Date:   9/1/2021  Patient name:  Roberto Morales  Date of admission:  8/25/2021 12:12 PM  MRN:   [id number]  YOB: 1945  PCP:    No primary care provider on file. Reason for Admission: Chest pain [R07.9]  Chest pain, unspecified type [R07.9]  NSTEMI (non-ST elevated myocardial infarction) ([facility] [address] [I21.4]      Subjective:       Clinical Changes / Abnormalities:     Patient seen and examined. No acute events overnight. Remained hemodynamically stable and afebrile. I/O last 3 completed shifts: In: 240 [P.O.:240]  Out: 5700 [Urine:5700]  I/O this shift:  In: 240 [P.O.:240]  Out: 1100 [Urine:1100]          Medications:   Scheduled Meds:    metoprolol tartrate  12.5 mg Oral BID    [START ON 9/2/2021] furosemide  40 mg IntraVENous Daily    guaiFENesin  600 mg Oral BID    ticagrelor  90 mg Oral BID    sodium chloride flush  10 mL IntraVENous 2 times per day    aspirin  81 mg Oral Daily    amiodarone  200 mg Oral TID    mupirocin   Nasal BID    polyethylene glycol  17 g Oral Daily    atorvastatin  20 mg Oral Nightly    pantoprazole  40 mg Oral Daily    ipratropium-albuterol  1 ampule Inhalation Q4H WA    insulin glargine  0.15 Units/kg SubCUTAneous Nightly     Continuous Infusions:    dextrose       CBC:   Recent Labs     08/30/21  0455 08/31/21  0459 09/01/21  0351   WBC 16.0* 9.8 9.7   HGB 8.2* 7.7* 9.0*    176 231     BMP:    Recent Labs     08/30/21  0455 08/31/21  0459 09/01/21  0351    135 137   K 5.1 4.9 4.2    100 95*   CO2 25 25 29   BUN 28* 25* 20   CREATININE 1.07* 0.79 0.97*   GLUCOSE 118* 116* 113*     Hepatic: No results for input(s): AST, ALT, ALB, BILITOT, ALKPHOS in the last 72 hours. Troponin: No results for input(s): TROPHS in the last 72 hours. BNP: No results for input(s): BNP in the last 72 hours. Lipids: No results for input(s): CHOL, HDL in the last 72 hours.     Invalid input(s): LDLCALCU  INR:   Recent Labs     08/30/21  0455 08/31/21  0459 09/01/21  0351   INR 1.0 1.0 1.0       Objective:   Vitals: /79   Pulse 98   Temp 98.1 °F (36.7 °C) (Oral)   Resp 16   Ht 5' 1\" (1.549 m)   Wt 117 lb 4.6 oz (53.2 kg)   SpO2 98%   BMI 22.16 kg/m²   General appearance: Alert. No acute distress. HEENT: Head: Normal, normocephalic, atraumatic. Neck: no JVD, trachea midline  Lungs: Clear to auscultation bilaterally, no wheeze or crackles  Heart: Regular rate and rhythm, S1, S2 normal, no murmur  Abdomen: Soft, non-tender  Extremities: No edema  Neurologic: No focal neurologic deficits          Assessment:   1. NSTEMI s/p PCI to mid LAD. PTCA and KVNG to proximal and mid LAD resulted in grade III perforation, hemodynamic compromise responded to 2 covered stents to seal perforation followed by hemodynamic stability    2. CAD s/p CABG  3. Cardiac tamponade s/p PERICARDIAL WINDOW W/ STERNAL WIRE REMOVAL 8/28/2021. Pericardial window drain removed on 8/30/21  4. HTN  5. HLD  6. ICM EF 35%    Plan / Recommendations:   1. Continue ASA & Brilinta  2. Continue Lipitor  3. Start lopressor for GDMT in setting of ICM with EF 35-40%  4. Continue amiodarone 200 TID, per CV surgery recommendation  5. Limited 2D ECHO shows no significant pericardial effusion after removal of pericardial window drain. 6. Patient is still volume overloaded on examination requiring 2L oxygen. Will cut down lasix to IV 40 mg daily. Thank you for allowing us to participate in [facility]. Electronically signed on 09/01/21 at 12:41 PM by:    Del Bailey MD, MD   Fellow, [address] [place] [address]    Attending Cardiologist Addendum: I have reviewed and performed the history, physical, subjective, objective, assessment, and plan with the student/resident/fellow/APN and agree with the note. I performed the history and physical personally.  I have made changes to the note above as needed. Will back off a bit on diuresis- just lasix 40 IV QD, next dose tomorrow  - hopefully start oral lasix tomorrow  EF improved to 35-40%- no need for lifevest  - due to lower BP not candidate for ACE/ARB/ARNI  - will however add aldactone  - then farxiga or jardiance as outpatient     Thank you for allowing me to participate in the care of this patient, please do not hesitate to call if you have any questions. Rivka Camp DO, [facility], [address], [address] [facility]  [facility]  [phone]

## 2021-09-01 NOTE — PROGRESS NOTES
Writer updated Dr. Ralph Marcus on patient's BP drop to 91/56 with a HR of 104 after receiving 40 mg of lasix and 12.5 mg of lopressor earlier today.

## 2021-09-01 NOTE — PROGRESS NOTES
Writer paged Dr. Hopkins Prudent clarifying patient's order for 40 mg IV Lasix BID due to patient having 3.25 L out last night. Patient had previous hypotension from hypovolemia from possible overdiuresing. Dr. Hopkins Prudent said to give lasix this morning and we will monitor from there. Also, no echo is needed for now.

## 2021-09-02 ENCOUNTER — APPOINTMENT (OUTPATIENT)
Dept: GENERAL RADIOLOGY | Age: 76
DRG: 270 | End: 2021-09-02
Payer: MEDICARE

## 2021-09-02 LAB
ANION GAP SERPL CALCULATED.3IONS-SCNC: 14 MMOL/L (ref 9–17)
BUN BLDV-MCNC: 24 MG/DL (ref 8–23)
BUN/CREAT BLD: ABNORMAL (ref 9–20)
CALCIUM SERPL-MCNC: 8.9 MG/DL (ref 8.6–10.4)
CHLORIDE BLD-SCNC: 93 MMOL/L (ref 98–107)
CO2: 29 MMOL/L (ref 20–31)
CREAT SERPL-MCNC: 1 MG/DL (ref 0.5–0.9)
CULTURE: NORMAL
CULTURE: NORMAL
GFR AFRICAN AMERICAN: >60 ML/MIN
GFR NON-AFRICAN AMERICAN: 54 ML/MIN
GFR SERPL CREATININE-BSD FRML MDRD: ABNORMAL ML/MIN/{1.73_M2}
GFR SERPL CREATININE-BSD FRML MDRD: ABNORMAL ML/MIN/{1.73_M2}
GLUCOSE BLD-MCNC: 104 MG/DL (ref 70–99)
HCT VFR BLD CALC: 30 % (ref 36.3–47.1)
HEMOGLOBIN: 9.4 G/DL (ref 11.9–15.1)
INR BLD: 1
Lab: NORMAL
Lab: NORMAL
MAGNESIUM: 2.2 MG/DL (ref 1.6–2.6)
MCH RBC QN AUTO: 30.8 PG (ref 25.2–33.5)
MCHC RBC AUTO-ENTMCNC: 31.3 G/DL (ref 28.4–34.8)
MCV RBC AUTO: 98.4 FL (ref 82.6–102.9)
NRBC AUTOMATED: 0 PER 100 WBC
PDW BLD-RTO: 13.2 % (ref 11.8–14.4)
PLATELET # BLD: 252 K/UL (ref 138–453)
PMV BLD AUTO: 11.1 FL (ref 8.1–13.5)
POTASSIUM SERPL-SCNC: 4.1 MMOL/L (ref 3.7–5.3)
PROTHROMBIN TIME: 10.9 SEC (ref 9.1–12.3)
RBC # BLD: 3.05 M/UL (ref 3.95–5.11)
SODIUM BLD-SCNC: 136 MMOL/L (ref 135–144)
SPECIMEN DESCRIPTION: NORMAL
SPECIMEN DESCRIPTION: NORMAL
WBC # BLD: 9 K/UL (ref 3.5–11.3)

## 2021-09-02 PROCEDURE — 97110 THERAPEUTIC EXERCISES: CPT

## 2021-09-02 PROCEDURE — 6370000000 HC RX 637 (ALT 250 FOR IP): Performed by: NURSE PRACTITIONER

## 2021-09-02 PROCEDURE — 97116 GAIT TRAINING THERAPY: CPT

## 2021-09-02 PROCEDURE — 99232 SBSQ HOSP IP/OBS MODERATE 35: CPT | Performed by: INTERNAL MEDICINE

## 2021-09-02 PROCEDURE — 6370000000 HC RX 637 (ALT 250 FOR IP): Performed by: INTERNAL MEDICINE

## 2021-09-02 PROCEDURE — 85610 PROTHROMBIN TIME: CPT

## 2021-09-02 PROCEDURE — 97535 SELF CARE MNGMENT TRAINING: CPT

## 2021-09-02 PROCEDURE — 85027 COMPLETE CBC AUTOMATED: CPT

## 2021-09-02 PROCEDURE — 2100000001 HC CVICU R&B

## 2021-09-02 PROCEDURE — 36415 COLL VENOUS BLD VENIPUNCTURE: CPT

## 2021-09-02 PROCEDURE — 83735 ASSAY OF MAGNESIUM: CPT

## 2021-09-02 PROCEDURE — 6370000000 HC RX 637 (ALT 250 FOR IP): Performed by: STUDENT IN AN ORGANIZED HEALTH CARE EDUCATION/TRAINING PROGRAM

## 2021-09-02 PROCEDURE — 80048 BASIC METABOLIC PNL TOTAL CA: CPT

## 2021-09-02 PROCEDURE — 71045 X-RAY EXAM CHEST 1 VIEW: CPT

## 2021-09-02 PROCEDURE — 2580000003 HC RX 258: Performed by: NURSE PRACTITIONER

## 2021-09-02 PROCEDURE — 97530 THERAPEUTIC ACTIVITIES: CPT

## 2021-09-02 RX ORDER — FUROSEMIDE 40 MG/1
40 TABLET ORAL DAILY
Status: DISCONTINUED | OUTPATIENT
Start: 2021-09-02 | End: 2021-09-03 | Stop reason: HOSPADM

## 2021-09-02 RX ADMIN — OXYCODONE HYDROCHLORIDE AND ACETAMINOPHEN 2 TABLET: 5; 325 TABLET ORAL at 14:35

## 2021-09-02 RX ADMIN — ATORVASTATIN CALCIUM 20 MG: 20 TABLET, FILM COATED ORAL at 21:34

## 2021-09-02 RX ADMIN — GUAIFENESIN 600 MG: 600 TABLET, EXTENDED RELEASE ORAL at 10:26

## 2021-09-02 RX ADMIN — SPIRONOLACTONE 25 MG: 25 TABLET, FILM COATED ORAL at 13:11

## 2021-09-02 RX ADMIN — PANTOPRAZOLE SODIUM 40 MG: 40 TABLET, DELAYED RELEASE ORAL at 10:26

## 2021-09-02 RX ADMIN — POLYETHYLENE GLYCOL 3350 17 G: 17 POWDER, FOR SOLUTION ORAL at 10:30

## 2021-09-02 RX ADMIN — OXYCODONE HYDROCHLORIDE AND ACETAMINOPHEN 2 TABLET: 5; 325 TABLET ORAL at 10:30

## 2021-09-02 RX ADMIN — AMIODARONE HYDROCHLORIDE 200 MG: 200 TABLET ORAL at 10:26

## 2021-09-02 RX ADMIN — OXYCODONE HYDROCHLORIDE AND ACETAMINOPHEN 1 TABLET: 5; 325 TABLET ORAL at 21:33

## 2021-09-02 RX ADMIN — TICAGRELOR 90 MG: 90 TABLET ORAL at 10:26

## 2021-09-02 RX ADMIN — AMIODARONE HYDROCHLORIDE 200 MG: 200 TABLET ORAL at 14:35

## 2021-09-02 RX ADMIN — AMIODARONE HYDROCHLORIDE 200 MG: 200 TABLET ORAL at 21:33

## 2021-09-02 RX ADMIN — TICAGRELOR 90 MG: 90 TABLET ORAL at 21:33

## 2021-09-02 RX ADMIN — SODIUM CHLORIDE, PRESERVATIVE FREE 10 ML: 5 INJECTION INTRAVENOUS at 21:33

## 2021-09-02 RX ADMIN — SODIUM CHLORIDE, PRESERVATIVE FREE 10 ML: 5 INJECTION INTRAVENOUS at 10:35

## 2021-09-02 RX ADMIN — ACETAMINOPHEN 650 MG: 325 TABLET ORAL at 04:44

## 2021-09-02 RX ADMIN — DIPHENHYDRAMINE HCL 25 MG: 25 TABLET ORAL at 21:34

## 2021-09-02 RX ADMIN — FUROSEMIDE 40 MG: 40 TABLET ORAL at 10:26

## 2021-09-02 RX ADMIN — GUAIFENESIN 600 MG: 600 TABLET, EXTENDED RELEASE ORAL at 21:33

## 2021-09-02 RX ADMIN — ASPIRIN 81 MG: 81 TABLET, COATED ORAL at 10:26

## 2021-09-02 ASSESSMENT — PAIN SCALES - GENERAL
PAINLEVEL_OUTOF10: 7
PAINLEVEL_OUTOF10: 3
PAINLEVEL_OUTOF10: 6
PAINLEVEL_OUTOF10: 3
PAINLEVEL_OUTOF10: 0
PAINLEVEL_OUTOF10: 7

## 2021-09-02 ASSESSMENT — ENCOUNTER SYMPTOMS
SHORTNESS OF BREATH: 0
NAUSEA: 0
ABDOMINAL PAIN: 0
COUGH: 0
SORE THROAT: 0
PHOTOPHOBIA: 0
CONSTIPATION: 0
BACK PAIN: 0
FACIAL SWELLING: 0
VOMITING: 0
EYE REDNESS: 0
WHEEZING: 0
CHOKING: 0

## 2021-09-02 ASSESSMENT — PAIN DESCRIPTION - LOCATION
LOCATION: TEETH
LOCATION: GENERALIZED;TEETH

## 2021-09-02 ASSESSMENT — PAIN DESCRIPTION - PAIN TYPE
TYPE: ACUTE PAIN
TYPE: ACUTE PAIN

## 2021-09-02 ASSESSMENT — PAIN DESCRIPTION - DESCRIPTORS: DESCRIPTORS: ACHING

## 2021-09-02 NOTE — PROGRESS NOTES
[facility]   Progress Note                 Date:   9/2/2021  Patient name:  Nataliya Naik  Date of admission:  8/25/2021 12:12 PM  MRN:   [id number]  YOB: 1945  PCP:    No primary care provider on file. Reason for Admission: Chest pain [R07.9]  Chest pain, unspecified type [R07.9]  NSTEMI (non-ST elevated myocardial infarction) ([facility] [I21.4]      Subjective:       Clinical Changes / Abnormalities:     Patient seen and examined. No acute events overnight. Remained hemodynamically stable and afebrile. I/O last 3 completed shifts: In: 480 [P.O.:480]  Out: 5625 [Urine:5625]  I/O this shift:  In: -   Out: 500 [Urine:500]          Medications:   Scheduled Meds:    furosemide  40 mg Oral Daily    [Held by provider] metoprolol tartrate  12.5 mg Oral BID    spironolactone  25 mg Oral Daily    guaiFENesin  600 mg Oral BID    ticagrelor  90 mg Oral BID    sodium chloride flush  10 mL IntraVENous 2 times per day    aspirin  81 mg Oral Daily    amiodarone  200 mg Oral TID    mupirocin   Nasal BID    polyethylene glycol  17 g Oral Daily    atorvastatin  20 mg Oral Nightly    pantoprazole  40 mg Oral Daily    ipratropium-albuterol  1 ampule Inhalation Q4H WA    insulin glargine  0.15 Units/kg SubCUTAneous Nightly     Continuous Infusions:    dextrose       CBC:   Recent Labs     08/31/21  0459 09/01/21  0351   WBC 9.8 9.7   HGB 7.7* 9.0*    231     BMP:    Recent Labs     08/31/21  0459 09/01/21  0351    137   K 4.9 4.2    95*   CO2 25 29   BUN 25* 20   CREATININE 0.79 0.97*   GLUCOSE 116* 113*     Hepatic: No results for input(s): AST, ALT, ALB, BILITOT, ALKPHOS in the last 72 hours. Troponin: No results for input(s): TROPHS in the last 72 hours. BNP: No results for input(s): BNP in the last 72 hours. Lipids: No results for input(s): CHOL, HDL in the last 72 hours.     Invalid input(s): LDLCALCU  INR:   Recent Labs     08/31/21  0459 09/01/21  0351   INR 1.0 1.0 Objective:   Vitals: /75   Pulse 92   Temp 98 °F (36.7 °C) (Oral)   Resp 16   Ht 5' 1\" (1.549 m)   Wt 117 lb 4.6 oz (53.2 kg)   SpO2 100%   BMI 22.16 kg/m²   General appearance: Alert. No acute distress. HEENT: Head: Normal, normocephalic, atraumatic. Neck: no JVD, trachea midline  Lungs: Clear to auscultation bilaterally, no wheeze or crackles  Heart: Regular rate and rhythm, S1, S2 normal, no murmur  Abdomen: Soft, non-tender  Extremities: No edema  Neurologic: No focal neurologic deficits          Assessment:   NSTEMI s/p PCI to mid LAD. PTCA and KVNG to proximal and mid LAD resulted in grade III perforation, hemodynamic compromise responded to 2 covered stents to seal perforation followed by hemodynamic stability    CAD s/p CABG  Cardiac tamponade s/p PERICARDIAL WINDOW W/ STERNAL WIRE REMOVAL 8/28/2021. Pericardial window drain removed on 8/30/21  HTN  HLD  ICM EF 35%- EF improved on latest limited echo    Plan / Recommendations:   Continue ASA & Brilinta  Continue Lipitor  Will Start lopressor for GDMT in setting of ICM with EF 35-40% when BP tolerates  Continue amiodarone 200 TID, per CV surgery recommendation  Limited 2D ECHO shows no significant pericardial effusion after removal of pericardial window drain. Will switch to po lasix today and added aldactone. Thank you for allowing us to participate in [facility]. Electronically signed on 09/02/21 at 6:03 AM by:    Alyse Carlton MD, MD   Fellow, [address]    Attending Cardiologist Addendum: I have reviewed and performed the history, physical, subjective, objective, assessment, and plan with the student/resident/fellow/APN and agree with the note. I performed the history and physical personally. I have made changes to the note above as needed. Needs rehab  Will follow from a distance, call with questions. Follow up in 2 weeks.      Thank you for allowing me to participate in the care of this patient, please do not hesitate to call if you have any questions. Arnulfo Carbajal DO, [facility], [address], [address] [facility]  [facility]. [facility]  [phone]

## 2021-09-02 NOTE — PROGRESS NOTES
Physical Therapy  Facility/Department: [facility] CAR 1  Daily Treatment Note  NAME: Binh Pickard  : 1945  MRN: [id number]    Date of Service: 2021    Discharge Recommendations: Further therapy recommended at discharge. Patient would benefit from continued therapy after discharge   PT Equipment Recommendations  Equipment Needed: No  Other: Pt has rolling walker. Assessment   Body structures, Functions, Activity limitations: Decreased functional mobility ; Decreased endurance;Decreased strength  Assessment: Pt amb 40' x2'  using RW x CGA, requiring 1 long seated rest break. Pt c/o light headedness t/o session, which relieves with rest. Pt will benefit from continued therapy to return to PLOF. Prognosis: Good  Decision Making: Medium Complexity  REQUIRES PT FOLLOW UP: Yes  Activity Tolerance  Activity Tolerance: Patient limited by fatigue;Patient limited by endurance     Patient Diagnosis(es): The primary encounter diagnosis was Chest pain, unspecified type. A diagnosis of NSTEMI (non-ST elevated myocardial infarction) [facility]) was also pertinent to this visit. has a past medical history of Atherosclerosis, CAD (coronary artery disease), Hydatidiform mole, and Rosacea. has a past surgical history that includes Carotid endarterectomy (age 44); Coronary artery bypass graft (age 45); Tonsillectomy and adenoidectomy (age 8); Dilation & curettage (); and Pericardium surgery (N/A, 2021).     Restrictions  Restrictions/Precautions  Restrictions/Precautions: Up as Tolerated, Surgical Protocols, Cardiac, Fall Risk  Required Braces or Orthoses?: No  Position Activity Restriction  Sternal Precautions: No Pushing, No Pulling, 5# Lifting Restrictions  Other position/activity restrictions: ambulate patient  Subjective   General  Chart Reviewed: Yes  Family / Caregiver Present: Yes ()  Subjective  Subjective: RN and pt agreeable to PT this AM Pt was sitting in chair upon arrivial, reports feeling weak, was more alert pleasant and cooperative t/o. General Comment  Comments: Pt supine in bed on 5L 02. Sp02 at 98%  Pain Screening  Patient Currently in Pain: Denies  Vital Signs  Patient Currently in Pain: Denies       Orientation  Orientation  Overall Orientation Status: Within Functional Limits  Cognition      Objective   Bed mobility  Comment: HOMA  Transfers  Sit to Stand: Moderate Assistance  Stand to sit: Moderate Assistance  Comment: Pt continues to become light headed upon standing up, which relieves with rest.  Ambulation  Ambulation?: Yes  Ambulation 1  Device: Rolling Walker  Other Apparatus: O2 (3L)  Assistance: Contact guard assistance  Quality of Gait: slow and cautious  Gait Deviations: Slow Crystal;Decreased step length;Decreased step height  Distance: 36' x2  Comments: Pt requires 1 long seated rest break durring amb. pt was able to show improvement in gait with better stride length and toe clearance. Stairs/Curb  Stairs?: No     Balance  Posture: Good  Sitting - Static: Good;-  Sitting - Dynamic: Fair;+  Standing - Static: Fair;+  Standing - Dynamic: Fair  Comments: Standing with rolling walker    Exercises: Seated LE exercise program: Long Arc Quads, hip abduction/adduction, heel/toe raises, and marches. Reps: 15X     AM-PAC Score  AM-PAC Inpatient Mobility Raw Score : 14 (09/02/21 1223)  AM-PAC Inpatient T-Scale Score : 38.1 (09/02/21 1223)  Mobility Inpatient CMS 0-100% Score: 61.29 (09/02/21 1223)  Mobility Inpatient CMS G-Code Modifier : CL (09/02/21 1223)   Goals  Short term goals  Time Frame for Short term goals: 14 visits  Short term goal 1: Independent bed mobility  Short term goal 2: Independent transfers  Short term goal 3: Independent ambulation with least restricitve device 200 ft  Short term goal 4: Pt to navigate 12 stairs SBA  Short term goal 5: Pt to tolerate 30 minutes of activity to improve endurance  and safety with mobility.   Patient Goals   Patient goals : Get stronger    Plan Plan  Times per week: 6x/week  Times per day: Daily  Current Treatment Recommendations: Transfer Training, Endurance Training, Strengthening, Gait Training, Functional Mobility Training, Stair training, Safety Education & Training  Safety Devices  Type of devices: Call light within reach, Left in chair, Nurse notified     Therapy Time   Individual Concurrent Group Co-treatment   Time In [phone]         Time Out 1107 (started at 948 to 1012. went back in at 1055 to 1107.)         Minutes 38         Timed Code Treatment Minutes: 38 Minutes (Pt requested a very long rest break post amb, writer to return to complete with therapeutic exercises). Treatment performed by Student PTA under the supervision of co-signing PTA who agrees with all treatment and documentation. Nikky Burkett PTA. Bryan CARUSOA.

## 2021-09-02 NOTE — PLAN OF CARE
Problem: SAFETY  Goal: Free from accidental physical injury  Outcome: Ongoing     Problem: PAIN  Goal: Patient's pain/discomfort is manageable  Outcome: Ongoing     Problem: Pain:  Goal: Pain level will decrease  Description: Pain level will decrease  Outcome: Ongoing  Goal: Control of acute pain  Description: Control of acute pain  Outcome: Ongoing  Goal: Control of chronic pain  Description: Control of chronic pain  Outcome: Ongoing     Problem: Skin Integrity:  Goal: Will show no infection signs and symptoms  Description: Will show no infection signs and symptoms  Outcome: Ongoing  Goal: Absence of new skin breakdown  Description: Absence of new skin breakdown  Outcome: Ongoing     Problem: Falls - Risk of:  Goal: Will remain free from falls  Description: Will remain free from falls  Outcome: Ongoing  Goal: Absence of physical injury  Description: Absence of physical injury  Outcome: Ongoing     Problem: OXYGENATION/RESPIRATORY FUNCTION  Goal: Patient will maintain patent airway  9/2/2021 1012 by Fransisco Valentin RCP  Outcome: Completed  Goal: Patient will achieve/maintain normal respiratory rate/effort  Description: Respiratory rate and effort will be within normal limits for the patient  9/2/2021 1012 by Fransisco Valentin RCP  Outcome: Completed     Problem: SKIN INTEGRITY  Goal: Skin integrity is maintained or improved  9/2/2021 1012 by Fransisco Valentin RCP  Outcome: Completed     Problem: Bleeding:  Goal: Will show no signs and symptoms of excessive bleeding  Description: Will show no signs and symptoms of excessive bleeding  Outcome: Ongoing   Questions and concerns addressed as needed at bedside with pt.

## 2021-09-02 NOTE — PLAN OF CARE
Nutrition Problem #1: Inadequate oral intake  Intervention: Food and/or Nutrient Delivery: Continue Current Diet, Continue Oral Nutrition Supplement  Nutritional Goals: Pt to meet % of est'd needs via PO Daily

## 2021-09-02 NOTE — PROGRESS NOTES
Occupational Therapy  Facility/Department: [facility] CAR 1  Daily Treatment Note  NAME: Luciano Canela  : 1945  MRN: [id number]    Date of Service: 2021    Discharge Recommendations:  Patient would benefit from continued therapy after discharge in order to increase pt strength, activity tolerance and independence. Assessment   Performance deficits / Impairments: Decreased functional mobility ; Decreased safe awareness;Decreased balance;Decreased ADL status; Decreased endurance;Decreased high-level IADLs;Decreased strength  Prognosis: Good  OT Education: OT Role;Energy Conservation;Transfer Training;ADL Adaptive Strategies;Precautions  Patient Education: purpose of OT; proper hand and foot placement; sternal precautions; sx soap; importance of activity; incentive spirometer  Barriers to Learning: pt demo F carry over; pt able to recall 0/3 sternal precautions  REQUIRES OT FOLLOW UP: Yes  Activity Tolerance  Activity Tolerance: Patient limited by fatigue  Safety Devices  Safety Devices in place: Yes  Type of devices: Gait belt;Left in chair;Call light within reach  Restraints  Initially in place: No       Patient Diagnosis(es): The primary encounter diagnosis was Chest pain, unspecified type. A diagnosis of NSTEMI (non-ST elevated myocardial infarction) [facility]) was also pertinent to this visit. has a past medical history of Atherosclerosis, CAD (coronary artery disease), Hydatidiform mole, and Rosacea. has a past surgical history that includes Carotid endarterectomy (age 44); Coronary artery bypass graft (age 45); Tonsillectomy and adenoidectomy (age 8); Dilation & curettage (1971); and Pericardium surgery (N/A, 2021).     Restrictions  Restrictions/Precautions  Restrictions/Precautions: Up as Tolerated, Surgical Protocols, Cardiac, Fall Risk  Required Braces or Orthoses?: No  Position Activity Restriction  Sternal Precautions: No Pushing, No Pulling, 5# Lifting Restrictions  Other position/activity restrictions: ambulate patient  Subjective   General  Chart Reviewed: Yes  Patient assessed for rehabilitation services?: Yes  Family / Caregiver Present: Yes (pt )  General Comment  Comments: Pt and RN agreeable to therapy this day. Pt pleasant and cooperative throughout session req increased time sec to increased fatigue. Pain Assessment  Pain Assessment: 0-10  Pain Level: 0  Pre Treatment Pain Screening  Comments / Details: Pt seated in chair at start of session. At session end pt seated in chair with call light in reach, BLE elevated and all needs met. Vital Signs  Patient Currently in Pain: Denies   Orientation  Orientation  Overall Orientation Status: Within Functional Limits  Objective    ADL  Feeding: Independent  Grooming: Modified independent ;Setup (oral care and face washing)  UE Bathing: Stand by assistance;Setup;Verbal cueing; Increased time to complete (seated in chair)  LE Bathing: Maximum assistance;Setup;Verbal cueing; Increased time to complete (seated/standing at chair)  UE Dressing: Stand by assistance;Setup;Verbal cueing; Increased time to complete (to doff/don gown)  LE Dressing: Maximum assistance;Setup;Verbal cueing; Increased time to complete (to doff/don socks)  Additional Comments: Grooming tasks completed seate supported/unsupported in chair. Dressing completed seated supported/unsupported in chair, SBA to doff/don gown and max A to doff/don socks. SBA for UB bathing seated supported/unsupported. Max A for LB bathing seated/standing req assist with mat/post and BLE from knees to feet. Education provided on sx soap adherence. Throughout session pt limited per decreased strength, activity tolerance and increased SOB req increased time with multiple rest breaks.      Balance  Sitting Balance: Supervision (pt tolerated approx 40 min static/dynamic sitting unsupported/supported)  Standing Balance: Contact guard assistance  Standing Balance  Time: pt tolerated approx 4 min  Activity: static standing at chair  Comment: utilizing RW limited per increased fatigue  Functional Mobility  Functional Mobility Comments: HOMA sec to pt fatigue  Bed mobility  Comment: pt seated in chair at start/end of session  Transfers  Sit to stand: Contact guard assistance  Stand to sit: Contact guard assistance  Transfer Comments: utilizing RW req increased time     Cognition  Overall Cognitive Status: [facility]    Plan   Plan  Times per week: 4-6x/wk  Current Treatment Recommendations: Strengthening, Endurance Training, Patient/Caregiver Education & Training, Equipment Evaluation, Education, & procurement, Self-Care / ADL, Home Management Training, Safety Education & Training, Functional Mobility Training, Balance Training    AM-PAC Score  AM-PAC Inpatient Daily Activity Raw Score: 17 (09/02/21 1153)  AM-PAC Inpatient ADL T-Scale Score : 37.26 (09/02/21 1153)  ADL Inpatient CMS 0-100% Score: 50.11 (09/02/21 1153)  ADL Inpatient CMS G-Code Modifier : CK (09/02/21 1153)    Goals  Short term goals  Time Frame for Short term goals: Patient will, by discharge  Short term goal 1: demo UB ADLs at . Bc Carrie I  Short term goal 2: demo LB ADLs at supervision using AE PRN  Short term goal 3: demo functional transfers/mobility using LRD at Supervision to engage in ADLs safely  Short term goal 4: demo 10+ min of dynamic standing tolerance reaching outside BHAVESH to engage in ADLs safely  Short term goal 5: demo 100% adherence to precautions during functional tasks with 0 cues       Therapy Time   Individual Concurrent Group Co-treatment   Time In 0837         Time Out 0932         Minutes 55         Timed Code Treatment Minutes: [place], BROWN/L

## 2021-09-02 NOTE — PROGRESS NOTES
Comprehensive Nutrition Assessment    Type and Reason for Visit:  Initial (LOS Day 7)    Nutrition Recommendations/Plan:   -Continue regular diet   -Continue ensure enlive supplements TID   -Will monitor po intake, weights and wound healing     Nutrition Assessment:  Pt admitted d/t NSTEMI. Writer attempted to visit pt multiple times - currently w/ MD. Per EMR, pt has been consuming 1-50% of her meals this week. No significant wt loss noted since admission. Will continue nutritional supplements d/t poor po intake and to aide in wound healing progression. Will monitor. Malnutrition Assessment:  Malnutrition Status:  Insufficient data    Context:  Acute Illness     Findings of the 6 clinical characteristics of malnutrition:  Energy Intake:  7 - 50% or less of estimated energy requirements for 5 or more days  Weight Loss:  No significant weight loss     Body Fat Loss:  Unable to assess     Muscle Mass Loss:  Unable to assess    Fluid Accumulation:  1 - Mild Extremities   Strength:  Not Performed    Estimated Daily Nutrient Needs:  Energy (kcal):  30-32 ~> 2941-7607 kcals/d; Weight Used for Energy Requirements:  Current     Protein (g):  1.2-1.4 gm/kg ~> 64-74 gms/d; Weight Used for Protein Requirements:  Current        Fluid (ml/day):  25 ~> 1350 mLs/d OR per MD discretion; Method Used for Fluid Requirements:  ml/Kg      Nutrition Related Findings:  BM 9/1; labs/meds reviewed      Wounds:  Open Wounds       Current Nutrition Therapies:    ADULT DIET;  Regular  Adult Oral Nutrition Supplement; Standard High Calorie/High Protein Oral Supplement    Anthropometric Measures:  · Height: 5' 1\" (154.9 cm)  · Current Body Weight: 117 lb (53.1 kg)   · Admission Body Weight: 117 lb (53.1 kg)    · Ideal Body Weight: 105 lbs; % Ideal Body Weight 111.4 %   · BMI: 22.1  · BMI Categories: Normal Weight (BMI 22.0 to 24.9) age over 72       Nutrition Diagnosis:   · Inadequate oral intake related to cardiac dysfunction as evidenced by intake 0-25%, intake 26-50% (Need for ONS)      Nutrition Interventions:   Food and/or Nutrient Delivery:  Continue Current Diet, Continue Oral Nutrition Supplement  Nutrition Education/Counseling:  Education not indicated   Coordination of Nutrition Care:  Continue to monitor while inpatient    Goals: Set   Pt to meet % of est'd needs via PO Daily       Nutrition Monitoring and Evaluation:   Food/Nutrient Intake Outcomes:  Food and Nutrient Intake, Supplement Intake  Physical Signs/Symptoms Outcomes:  Biochemical Data, Nutrition Focused Physical Findings, Skin, Weight, GI Status, Fluid Status or Edema     Discharge Planning:     Too soon to determine     Electronically signed by Akanksha Pardo RD, LD on 9/2/21 at 12:49 PM EDT    Contact: [phone]

## 2021-09-02 NOTE — PLAN OF CARE
BRONCHOSPASM/BRONCHOCONSTRICTION     [x]         IMPROVE AERATION/BREATH SOUNDS  [x]   ADMINISTER BRONCHODILATOR THERAPY AS APPROPRIATE  [x]   ASSESS BREATH SOUNDS  []   IMPLEMENT AEROSOL/MDI PROTOCOL  [x]   PATIENT EDUCATION AS NEEDED    PATIENT REFUSES TO WEAR BIPAP     [x] Risks and benefits explained to patient   [x] Patient refuses to wear Bipap stating \"no\"  [x] Patient verbalizes understanding of information presented.  ATELECTASIS     [x]  PREVENT ATELECTASIS  [x]   ASSESS BREATH SOUNDS  []   IMPLEMENT HYPERINFLATION PROTOCOL  [x]  INCENTIVE SPIROMETRY INSTRUCTION  []   PATIENT EDUCATION AS NEEDED    MOBILIZE SECRETIONS    [x]   ASSESS BREATH SOUNDS  [x]   ASSESS SPUTUM PRODUCTION  [x]   COUGH AND DEEP BREATHING  []  IMPLEMENT SECRETION MANAGEMENT PROTOCOL  [x]   PATIENT EDUCATION AS NEEDED

## 2021-09-02 NOTE — PROGRESS NOTES
[facility]  Internal Medicine Teaching Residency Program  Inpatient Daily Progress Note  ______________________________________________________________________________    Patient: Gerhardt Matin  YOB: 1945   MXQ:[id number]    Acct: [de-identified]     Room: [address] [place] [address]  Admit date: 8/25/2021  Today's date: 09/02/21  Number of days in the hospital: 7    SUBJECTIVE   Admitting Diagnosis: NSTEMI (non-ST elevated myocardial infarction) [facility])     CC: Chest pain    Pt examined at bedside. Chart & results reviewed. No acute events overnight. Denies any chest pain, shortness of breath. Hemodynamically stable. Blood pressure at soft end 94/65. On 2 L of oxygen  Output 2225. Net : -6.5Lit since admission   Chest xray: Improving vascular congestion. Slightly bilateral pleural effusions. Hb this am 9.4  Creatinine 0.97> 1.00     ROS:  Review of Systems   Constitutional: Positive for fatigue. Negative for chills, diaphoresis and fever. HENT: Negative for facial swelling, nosebleeds, sneezing and sore throat. Eyes: Negative for photophobia, redness and visual disturbance. Respiratory: Negative for cough, choking, shortness of breath and wheezing. Cardiovascular: Negative for chest pain, palpitations and leg swelling. Gastrointestinal: Negative for abdominal pain, constipation, nausea and vomiting. Endocrine: Positive for polyuria. Negative for polydipsia and polyphagia. Genitourinary: Negative for dysuria, frequency, hematuria and urgency. Musculoskeletal: Negative for arthralgias, back pain and myalgias. Neurological: Negative for dizziness, tremors, numbness and headaches. Psychiatric/Behavioral: Negative for agitation, behavioral problems and confusion. BRIEF HISTORY     The patient is a pleasant 64 y.o. female presents with a chief complaint of chest pain in the center of the chest, 7/10 in intensity and radiating to the jaw. Patient received aspirin  and sublingual nitro which decreased her chest pain to 1/10. She denies any dyspnea or diaphoresis. Patient has history of coronary artery disease.     The patient denies any fever, cough, lung disease, orthopnea, PND, headache, nasal discharge, abdominal pain, diarrhea, urinary symptoms.     Patient has history of coronary artery disease and undergone coronary artery bypass grafting at age 45, carotid endarterectomy at age 44. 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     Patient takes aspirin 81 mg, Plavix 75 mg daily, atorvastatin 10 mg daily, biotin, diltiazem 180 mg daily, metoprolol 25 mg twice daily and isosorbide 10 mg by mouth twice daily.       OBJECTIVE     Vital Signs:  /75   Pulse 92   Temp 98 °F (36.7 °C) (Oral)   Resp 16   Ht 5' 1\" (1.549 m)   Wt 117 lb 4.6 oz (53.2 kg)   SpO2 100%   BMI 22.16 kg/m²     Temp (24hrs), Av °F (36.7 °C), Min:97.8 °F (36.6 °C), Max:98.3 °F (36.8 °C)    In: 240   Out: 1125 [Urine:1125]    Physical Exam:  Constitutional: This is a well developed, well nourished, 18.5-24.9 - Normal 64y.o. year old female who is alert, oriented, cooperative and in no apparent distress. Head:normocephalic and atraumatic. EENT:  PERRLA. No conjunctival injections. No thrush was noted. Neck: Supple without thyromegaly. No elevated JVP. Trachea was midline. Respiratory: Chest was symmetrical without dullness to percussion. Breath sounds bilaterally were clear to auscultation. There were no wheezes, rhonchi or rales. There is no intercostal retraction or use of accessory muscles. No egophony noted. Cardiovascular: Regular without murmur, clicks, gallops or rubs. Abdomen: Slightly rounded and soft without organomegaly. No rebound, rigidity or guarding was appreciated. Lymphatic: No lymphadenopathy. Musculoskeletal:  No gross muscle weakness.     Extremities:  No lower extremity edema, ulcerations, tenderness, 09/02/21  0539    137 136   K 4.9 4.2 4.1    95* 93*   CO2 25 29 29   BUN 25* 20 24*   CREATININE 0.79 0.97* 1.00*     PT/INR:   Recent Labs     08/31/21  0459 09/01/21  0351 09/02/21  0539   PROTIME 10.7 10.6 10.9   INR 1.0 1.0 1.0     FASTING LIPID PANEL:  Lab Results   Component Value Date    CHOL 173 10/28/2015    HDL 70 05/07/2021    TRIG 76 10/28/2015     MICROBIOLOGY:   Lab Results   Component Value Date/Time    CULTURE NO GROWTH 5 DAYS 08/27/2021 04:15 PM       Imaging:    XR ABDOMEN (KUB) (SINGLE AP VIEW)    Result Date: 8/27/2021  Nonspecific bowel gas pattern without evidence for obstruction. XR CHEST PORTABLE    Result Date: 9/1/2021  No significant interval change. XR CHEST PORTABLE    Result Date: 8/31/2021  No significant interval change. XR CHEST PORTABLE    Result Date: 8/30/2021  1. No significant change in central predominant airspace and interstitial opacities, most likely edema (cardiogenic or noncardiogenic) or pneumonia. 2. Persistent trace to small bilateral pleural effusions with underlying bibasilar passive atelectasis. Superimposed pneumonia and aspiration are not excluded especially on the left. XR CHEST PORTABLE    Result Date: 8/29/2021  Improved aeration in the lung apices, though worsening mid and lower lung field interstitial and alveolar infiltrates with small bilateral effusions. XR CHEST PORTABLE    Result Date: 8/28/2021  Status post open heart surgery; tubes and lines appear satisfactory. Likely pulmonary edema, as discussed above. No pneumothorax or large pleural effusion. XR CHEST PORTABLE    Result Date: 8/27/2021  Chronic pulmonary change with left basilar infiltrate. XR CHEST PORTABLE    Result Date: 8/25/2021  No acute pulmonary disease. Calcific atherosclerotic disease aorta. ASSESSMENT & PLAN     ASSESSMENT / PLAN:     1. NSTEMI Type I s/p PCI to mid LAD. PTA and KVNG to proximal and mid LAD complicated with perforation:

## 2021-09-03 ENCOUNTER — APPOINTMENT (OUTPATIENT)
Dept: GENERAL RADIOLOGY | Age: 76
DRG: 270 | End: 2021-09-03
Payer: MEDICARE

## 2021-09-03 VITALS
TEMPERATURE: 97.8 F | WEIGHT: 113.2 LBS | DIASTOLIC BLOOD PRESSURE: 60 MMHG | HEART RATE: 100 BPM | OXYGEN SATURATION: 95 % | RESPIRATION RATE: 16 BRPM | SYSTOLIC BLOOD PRESSURE: 106 MMHG | HEIGHT: 61 IN | BODY MASS INDEX: 21.37 KG/M2

## 2021-09-03 LAB
ANION GAP SERPL CALCULATED.3IONS-SCNC: 9 MMOL/L (ref 9–17)
BUN BLDV-MCNC: 31 MG/DL (ref 8–23)
BUN/CREAT BLD: ABNORMAL (ref 9–20)
CALCIUM SERPL-MCNC: 9.1 MG/DL (ref 8.6–10.4)
CHLORIDE BLD-SCNC: 93 MMOL/L (ref 98–107)
CO2: 35 MMOL/L (ref 20–31)
CREAT SERPL-MCNC: 1.19 MG/DL (ref 0.5–0.9)
GFR AFRICAN AMERICAN: 53 ML/MIN
GFR NON-AFRICAN AMERICAN: 44 ML/MIN
GFR SERPL CREATININE-BSD FRML MDRD: ABNORMAL ML/MIN/{1.73_M2}
GFR SERPL CREATININE-BSD FRML MDRD: ABNORMAL ML/MIN/{1.73_M2}
GLUCOSE BLD-MCNC: 104 MG/DL (ref 70–99)
HCT VFR BLD CALC: 30.2 % (ref 36.3–47.1)
HEMOGLOBIN: 9.4 G/DL (ref 11.9–15.1)
INR BLD: 1
MAGNESIUM: 2.2 MG/DL (ref 1.6–2.6)
MCH RBC QN AUTO: 30.7 PG (ref 25.2–33.5)
MCHC RBC AUTO-ENTMCNC: 31.1 G/DL (ref 28.4–34.8)
MCV RBC AUTO: 98.7 FL (ref 82.6–102.9)
NRBC AUTOMATED: 0 PER 100 WBC
PDW BLD-RTO: 13.1 % (ref 11.8–14.4)
PLATELET # BLD: 316 K/UL (ref 138–453)
PMV BLD AUTO: 10.8 FL (ref 8.1–13.5)
POTASSIUM SERPL-SCNC: 4.6 MMOL/L (ref 3.7–5.3)
PROTHROMBIN TIME: 10.4 SEC (ref 9.1–12.3)
RBC # BLD: 3.06 M/UL (ref 3.95–5.11)
SARS-COV-2, RAPID: NOT DETECTED
SODIUM BLD-SCNC: 137 MMOL/L (ref 135–144)
SPECIMEN DESCRIPTION: NORMAL
WBC # BLD: 8.7 K/UL (ref 3.5–11.3)

## 2021-09-03 PROCEDURE — 87635 SARS-COV-2 COVID-19 AMP PRB: CPT

## 2021-09-03 PROCEDURE — 6370000000 HC RX 637 (ALT 250 FOR IP): Performed by: NURSE PRACTITIONER

## 2021-09-03 PROCEDURE — 2580000003 HC RX 258: Performed by: NURSE PRACTITIONER

## 2021-09-03 PROCEDURE — 85027 COMPLETE CBC AUTOMATED: CPT

## 2021-09-03 PROCEDURE — 83735 ASSAY OF MAGNESIUM: CPT

## 2021-09-03 PROCEDURE — 6370000000 HC RX 637 (ALT 250 FOR IP): Performed by: STUDENT IN AN ORGANIZED HEALTH CARE EDUCATION/TRAINING PROGRAM

## 2021-09-03 PROCEDURE — 71045 X-RAY EXAM CHEST 1 VIEW: CPT

## 2021-09-03 PROCEDURE — 36415 COLL VENOUS BLD VENIPUNCTURE: CPT

## 2021-09-03 PROCEDURE — 97116 GAIT TRAINING THERAPY: CPT

## 2021-09-03 PROCEDURE — 85610 PROTHROMBIN TIME: CPT

## 2021-09-03 PROCEDURE — 97110 THERAPEUTIC EXERCISES: CPT

## 2021-09-03 PROCEDURE — 99239 HOSP IP/OBS DSCHRG MGMT >30: CPT | Performed by: INTERNAL MEDICINE

## 2021-09-03 PROCEDURE — 80048 BASIC METABOLIC PNL TOTAL CA: CPT

## 2021-09-03 PROCEDURE — 97535 SELF CARE MNGMENT TRAINING: CPT

## 2021-09-03 RX ORDER — FUROSEMIDE 40 MG/1
40 TABLET ORAL DAILY
Qty: 60 TABLET | Refills: 3 | Status: SHIPPED | OUTPATIENT
Start: 2021-09-03

## 2021-09-03 RX ORDER — AMIODARONE HYDROCHLORIDE 200 MG/1
200 TABLET ORAL 2 TIMES DAILY
Qty: 60 TABLET | Refills: 2 | Status: SHIPPED | OUTPATIENT
Start: 2021-09-03 | End: 2021-11-15 | Stop reason: ALTCHOICE

## 2021-09-03 RX ORDER — ATORVASTATIN CALCIUM 20 MG/1
20 TABLET, FILM COATED ORAL NIGHTLY
Qty: 30 TABLET | Refills: 3 | Status: SHIPPED | OUTPATIENT
Start: 2021-09-03 | End: 2021-09-03

## 2021-09-03 RX ORDER — ATORVASTATIN CALCIUM 40 MG/1
40 TABLET, FILM COATED ORAL DAILY
Qty: 30 TABLET | Refills: 5 | Status: SHIPPED | OUTPATIENT
Start: 2021-09-03

## 2021-09-03 RX ORDER — LACTOBACILLUS RHAMNOSUS GG 10B CELL
1 CAPSULE ORAL
Status: DISCONTINUED | OUTPATIENT
Start: 2021-09-03 | End: 2021-09-03 | Stop reason: HOSPADM

## 2021-09-03 RX ORDER — PANTOPRAZOLE SODIUM 40 MG/1
40 TABLET, DELAYED RELEASE ORAL DAILY
Qty: 30 TABLET | Refills: 3 | Status: SHIPPED | OUTPATIENT
Start: 2021-09-04

## 2021-09-03 RX ORDER — LACTOBACILLUS RHAMNOSUS GG 10B CELL
1 CAPSULE ORAL
Qty: 15 CAPSULE | Refills: 0 | Status: SHIPPED | OUTPATIENT
Start: 2021-09-03 | End: 2021-09-08

## 2021-09-03 RX ADMIN — SODIUM CHLORIDE, PRESERVATIVE FREE 10 ML: 5 INJECTION INTRAVENOUS at 08:30

## 2021-09-03 RX ADMIN — ACETAMINOPHEN 650 MG: 325 TABLET ORAL at 06:18

## 2021-09-03 RX ADMIN — GUAIFENESIN 600 MG: 600 TABLET, EXTENDED RELEASE ORAL at 08:30

## 2021-09-03 RX ADMIN — TICAGRELOR 90 MG: 90 TABLET ORAL at 08:30

## 2021-09-03 RX ADMIN — ASPIRIN 81 MG: 81 TABLET, COATED ORAL at 08:30

## 2021-09-03 RX ADMIN — PANTOPRAZOLE SODIUM 40 MG: 40 TABLET, DELAYED RELEASE ORAL at 08:30

## 2021-09-03 RX ADMIN — ACETAMINOPHEN 650 MG: 325 TABLET ORAL at 15:42

## 2021-09-03 RX ADMIN — AMIODARONE HYDROCHLORIDE 200 MG: 200 TABLET ORAL at 08:30

## 2021-09-03 RX ADMIN — AMIODARONE HYDROCHLORIDE 200 MG: 200 TABLET ORAL at 15:43

## 2021-09-03 RX ADMIN — POLYETHYLENE GLYCOL 3350 17 G: 17 POWDER, FOR SOLUTION ORAL at 08:30

## 2021-09-03 ASSESSMENT — PAIN SCALES - GENERAL
PAINLEVEL_OUTOF10: 0
PAINLEVEL_OUTOF10: 0
PAINLEVEL_OUTOF10: 3
PAINLEVEL_OUTOF10: 0
PAINLEVEL_OUTOF10: 6
PAINLEVEL_OUTOF10: 0

## 2021-09-03 ASSESSMENT — ENCOUNTER SYMPTOMS
DIARRHEA: 0
NAUSEA: 0
RHINORRHEA: 0
EYE REDNESS: 0
PHOTOPHOBIA: 0
ABDOMINAL PAIN: 0
SHORTNESS OF BREATH: 0
CHOKING: 0
COUGH: 0
BLOOD IN STOOL: 0

## 2021-09-03 NOTE — PROGRESS NOTES
[facility]  Internal Medicine Teaching Residency Program  Inpatient Daily Progress Note  ______________________________________________________________________________    Patient: Shana Najera  YOB: 1945   [id number]    Acct: [de-identified]     Room: [address] [place] [address]  Admit date: 8/25/2021  Today's date: 09/03/21  Number of days in the hospital: 8    SUBJECTIVE   Admitting Diagnosis: NSTEMI (non-ST elevated myocardial infarction) [facility])  CC: Chest pain    Pt examined at bedside. Chart & results reviewed. Patient reported doing better. She remained afebrile and hemodynamically stable. She denies shortness of breath, chest pain, nausea, diaphoresis, vomiting or abdominal pain. Lasix and Aldactone are held this morning due to worsening renal function. Cr 1.19 <--1.00  Uout 1.2L/24 hrs  Restarted Lopressor 12.5 mg daily as BP and HR are tolerating. Cardiology following, will follow up recommendations    ROS:  Review of Systems   Constitutional: Negative for chills, fatigue and fever. HENT: Negative for hearing loss, nosebleeds, rhinorrhea and sneezing. Eyes: Negative for photophobia, redness and visual disturbance. Respiratory: Negative for cough, choking and shortness of breath. Cardiovascular: Negative for chest pain, palpitations and leg swelling. Gastrointestinal: Negative for abdominal pain, blood in stool, diarrhea and nausea. Endocrine: Negative for polydipsia, polyphagia and polyuria. Genitourinary: Negative for dysuria, frequency and urgency. Neurological: Negative for dizziness, seizures, syncope and headaches. Psychiatric/Behavioral: Negative for agitation, behavioral problems and confusion. BRIEF HISTORY        The patient is a pleasant 75 y. o. female presents with a chief complaint of chest pain in the center of the chest, 7/10 in intensity and radiating to the jaw.  Patient received aspirin  and sublingual nitro which decreased her chest pain to 1/10.  She denies any dyspnea or diaphoresis.  Patient has history of coronary artery disease.     The patient denies any fever, cough, lung disease, orthopnea, PND, headache, nasal discharge, abdominal pain, diarrhea, urinary symptoms.     Patient has history of coronary artery disease and undergone coronary artery bypass grafting at age 51, carotid endarterectomy at age 50.  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     Patient takes aspirin 81 mg, Plavix 75 mg daily, atorvastatin 10 mg daily, biotin, diltiazem 180 mg daily, metoprolol 25 mg twice daily and isosorbide 10 mg by mouth twice daily. OBJECTIVE     Vital Signs:  /71   Pulse 88   Temp 98 °F (36.7 °C) (Oral)   Resp 16   Ht 5' 1\" (1.549 m)   Wt 113 lb 3.2 oz (51.3 kg)   SpO2 97%   BMI 21.39 kg/m²     Temp (24hrs), Av °F (36.7 °C), Min:97.9 °F (36.6 °C), Max:98.2 °F (36.8 °C)    In: 840   Out: 1100 [Urine:1100]    Physical Exam:  Constitutional: This is a well developed, well nourished, 18.5-24.9 - Normal 64y.o. year old female who is alert, oriented, cooperative and in no apparent distress. Head:normocephalic and atraumatic. EENT:  PERRLA. No conjunctival injections. No thrush was noted. Neck: Supple without thyromegaly. No elevated JVP. Trachea was midline. Respiratory: Chest was symmetrical without dullness to percussion. Breath sounds bilaterally were clear to auscultation. There were no wheezes, rhonchi or rales. There is no intercostal retraction or use of accessory muscles. No egophony noted. Cardiovascular: Regular without murmur, clicks, gallops or rubs. Abdomen: Slightly rounded and soft without organomegaly. No rebound, rigidity or guarding was appreciated. Lymphatic: No lymphadenopathy. Musculoskeletal:  No gross muscle weakness. Extremities:  No lower extremity edema, ulcerations, tenderness, varicosities or erythema.   Muscle size, tone and strength are normal.  No involuntary movements are noted. Skin:  Warm and dry. Good color, turgor and pigmentation. No lesions or scars.   No cyanosis or clubbing  Neurological/Psychiatric: The patient's general behavior, level of consciousness, thought content and emotional status is normal.        Medications:  Scheduled Medications:    [Held by provider] furosemide  40 mg Oral Daily    metoprolol tartrate  12.5 mg Oral BID    [Held by provider] spironolactone  25 mg Oral Daily    guaiFENesin  600 mg Oral BID    ticagrelor  90 mg Oral BID    sodium chloride flush  10 mL IntraVENous 2 times per day    aspirin  81 mg Oral Daily    amiodarone  200 mg Oral TID    polyethylene glycol  17 g Oral Daily    atorvastatin  20 mg Oral Nightly    pantoprazole  40 mg Oral Daily     Continuous Infusions:    dextrose       PRN Medicationssodium chloride flush, 10 mL, PRN  ondansetron, 4 mg, Q8H PRN  acetaminophen, 650 mg, Q4H PRN  oxyCODONE-acetaminophen, 1 tablet, Q4H PRN   Or  oxyCODONE-acetaminophen, 2 tablet, Q4H PRN  fentanNYL, 25 mcg, Q1H PRN   Or  fentanNYL, 50 mcg, Q1H PRN  hydrALAZINE, 5 mg, Q5 Min PRN  metoprolol, 2.5 mg, Q10 Min PRN  diphenhydrAMINE, 25 mg, Nightly PRN  bisacodyl, 5 mg, Daily PRN  fleet, 1 enema, Daily PRN  calcium chloride IVPB, 1,000 mg, PRN  magnesium sulfate, 1,000 mg, PRN  potassium chloride, 20 mEq, PRN  albumin human, 25 g, PRN  glucose, 15 g, PRN  dextrose, 12.5 g, PRN  glucagon (rDNA), 1 mg, PRN  dextrose, 100 mL/hr, PRN        Diagnostic Labs:  CBC:   Recent Labs     09/01/21  0351 09/02/21  0539 09/03/21  0337   WBC 9.7 9.0 8.7   RBC 2.95* 3.05* 3.06*   HGB 9.0* 9.4* 9.4*   HCT 28.9* 30.0* 30.2*   MCV 98.0 98.4 98.7   RDW 13.3 13.2 13.1    252 316     BMP:   Recent Labs     09/01/21  0351 09/02/21  0539 09/03/21  0337    136 137   K 4.2 4.1 4.6   CL 95* 93* 93*   CO2 29 29 35*   BUN 20 24* 31*   CREATININE 0.97* 1.00* 1.19*     BNP: No results for input(s): BNP in PORTABLE    Result Date: 8/27/2021  Chronic pulmonary change with left basilar infiltrate. ASSESSMENT & PLAN     ASSESSMENT / PLAN:     NSTEMI (non-ST elevated myocardial infarction) (HCC)  - Continue ASA and Brilinta  - Continue Lipitor  - Lasix PO and Aldactone on HOLD due to worsening renal function  - Will monitor fluid status and diuresis as needed  - Continue Lopressor 12.5 mg BID for LVEF 35-40% per Cardiology; hold for HR<60 and SBP <110  - Cardiology following, will follow up recommendations       CAD s/p CABG (about 20 years ago)  - Continue ASA and Brilinta  - Continue Lipitor  - Lasix PO and Aldactone on HOLD due to worsening renal function  - Will monitor fluid status and diuresis as needed  - Cardiology following, will follow up recommendation        Cardiac tamponade- resolved  - Initial Echo on presentation showed large pericardial effusion which was squeezing on the left ventricle and believed to be causing left ventricular tamponade physiology  - s/p pericardial window and subsequent removal by CT surgery  - Repeat Echo on 08/30 after removal of pericardial window shows no Pericardial effusion and LVEF 35-40%  - Continue Amiodarone 200 TID per CT surgery recommendation  - Cardiology following           DVT ppx : SCD  GI ppx: Protonix     PT/OT: On board  Discharge Planning / SW: Accepted at [facility]- will need Covid test 48 hours before DC. Angelina Loza MD  Internal Medicine Resident, PGY-1  [facility];  [place], [place]  9/3/2021, 7:50 AM

## 2021-09-03 NOTE — DISCHARGE INSTR - COC
Continuity of Care Form    Patient Name: Salvador Weber   :  1945  MRN:  [id number]    Admit date:  2021  Discharge date:  ***    Code Status Order: Full Code   Advance Directives:      Admitting Physician:  Gemma Gardner MD  PCP: No primary care provider on file.     Discharging Nurse: [facility] Unit/Room#: 1008/1008-01  Discharging Unit Phone Number: ***    Emergency Contact:   Extended Emergency Contact Information  Primary Emergency Contact: Cumby Cushing  Address:  [address] [place]   Home Phone: [phone]  Relation: Spouse  Secondary Emergency Contact: [address] Phone: [phone]  Relation: Child    Past Surgical History:  Past Surgical History:   Procedure Laterality Date    CAROTID ENDARTERECTOMY  age 44    CORONARY ARTERY BYPASS GRAFT  age 45    [address]    Hydatiform mole pregnancy    PERICARDIUM SURGERY N/A 2021    SUB-XYPHOID WINDOW,PERICARDIAL DRAINAGE, STERNAL WIRE REMOVAL performed by Ree Braga MD at [facility] [address]  age 8       Immunization History:   Immunization History   Administered Date(s) Administered    COVID-19, Pfizer, PF, 30mcg/0.3mL 2021, 2021       Active Problems:  Patient Active Problem List   Diagnosis Code    Hydatidiform mole O01.9    Chest pain R07.9    NSTEMI (non-ST elevated myocardial infarction) ([facility] [address] I21.4    Pericardial effusion I31.3    Intractable nausea and vomiting R11.2       Isolation/Infection:   Isolation            No Isolation          Patient Infection Status       None to display            Nurse Assessment:  Last Vital Signs: /68   Pulse 80   Temp 97.9 °F (36.6 °C) (Oral)   Resp 16   Ht 5' 1\" (1.549 m)   Wt 113 lb 3.2 oz (51.3 kg)   SpO2 95%   BMI 21.39 kg/m²     Last documented pain score (0-10 scale): Pain Level: 0  Last Weight:   Wt Readings from Last 1 Encounters:   21 113 lb 3.2 oz (51.3 kg) Phone:  Fax:    Dialysis Facility (if applicable)   Name:  Address:  Dialysis Schedule:  Phone:  Fax:    / signature: Electronically signed by Shayne Krishna RN on 9/3/21 at 9:08 AM EDT    PHYSICIAN SECTION    Prognosis: Fair    Condition at Discharge: Stable    Rehab Potential (if transferring to Rehab): Fair    Recommended Labs or Other Treatments After Discharge: Bmp in 3 days on 9/6/2021. Results to nursing home physician. And follow up with your own cardiologist in 2 weeks. Monitor blood pressure and vitals. PT and OT eval and treat  Monitor oxygen saturations and oxygen via nasal cannula as needed to keep saturations more than 69%    Physician Certification: I certify the above information and transfer of Henry Son  is necessary for the continuing treatment of the diagnosis listed and that she requires [place] for greater 30 days.      Update Admission H&P: No change in H&P    PHYSICIAN SIGNATURE:  Electronically signed by Abliio Piña MD on 9/3/21 at 1:35 PM EDT

## 2021-09-03 NOTE — PLAN OF CARE
ATELECTASIS     [x]  PREVENT ATELECTASIS  [x]   ASSESS BREATH SOUNDS  []   IMPLEMENT HYPERINFLATION PROTOCOL  [x]  INCENTIVE SPIROMETRY INSTRUCTION  [x]   PATIENT EDUCATION AS NEEDED    MOBILIZE SECRETIONS    [x]   ASSESS BREATH SOUNDS  [x]   ASSESS SPUTUM PRODUCTION  [x]   COUGH AND DEEP BREATHING  []  IMPLEMENT SECRETION MANAGEMENT PROTOCOL  [x]   PATIENT EDUCATION AS NEEDED

## 2021-09-03 NOTE — PROGRESS NOTES
Perfectserved Dr. Vel Keith about possible discharge, doctor stated they will talk about it in rounds and update RN. Also notified them that patient needs rapid covid swab before discharge.     Mery Perry RN

## 2021-09-03 NOTE — PROGRESS NOTES
Occupational Therapy  Facility/Department: [facility] CAR 1  Daily Treatment Note  NAME: Collette Landau  : 1945  MRN: [id number]    Date of Service: 9/3/2021    Discharge Recommendations:  Patient would benefit from continued therapy after discharge  OT Equipment Recommendations  ADL Assistive Devices: Transfer Tub Bench;Reacher  Copied from   Assessment   Performance deficits / Impairments: Decreased functional mobility ; Decreased safe awareness;Decreased balance;Decreased ADL status; Decreased endurance;Decreased high-level IADLs;Decreased strength  Treatment Diagnosis: Chest Pain  Prognosis: Good  OT Education: OT Role;Energy Conservation;Transfer Training;ADL Adaptive Strategies;Precautions  Patient Education: purpose of OT; proper hand and foot placement; sternal precautions; sx soap; importance of activity; incentive spirometer  Barriers to Learning: pt demo F carry over  Activity Tolerance  Activity Tolerance: Patient limited by fatigue  Safety Devices  Safety Devices in place: Yes  Type of devices: Left in chair;Call light within reach  Restraints  Initially in place: No         Patient Diagnosis(es): The primary encounter diagnosis was Chest pain, unspecified type. Diagnoses of NSTEMI (non-ST elevated myocardial infarction) ([facility] [address] and Acute kidney injury [facility]) were also pertinent to this visit. has a past medical history of Atherosclerosis, CAD (coronary artery disease), Hydatidiform mole, and Rosacea. has a past surgical history that includes Carotid endarterectomy (age 44); Coronary artery bypass graft (age 45); Tonsillectomy and adenoidectomy (age 8); Dilation & curettage (); and Pericardium surgery (N/A, 2021).     Restrictions  Restrictions/Precautions  Restrictions/Precautions: Up as Tolerated, Surgical Protocols, Cardiac, Fall Risk  Required Braces or Orthoses?: No  Position Activity Restriction  Sternal Precautions: No Pushing, No Pulling, 5# Lifting Restrictions  Other position/activity restrictions: ambulate patient, up in chair for meals  Subjective   General  Chart Reviewed: Yes  Patient assessed for rehabilitation services?: Yes  Family / Caregiver Present: Yes (pt )  General Comment  Comments: Pt pleasant and cooperative throughout session req increased time sec to fatigue. Pain Assessment  Pain Assessment: 0-10  Pain Level: 0  Vital Signs  Patient Currently in Pain: No   Orientation  Orientation  Overall Orientation Status: Within Functional Limits  Objective    ADL  Feeding: Independent  Grooming: Modified independent ;Setup (oral care and face washing)  UE Bathing: Stand by assistance;Setup;Verbal cueing; Increased time to complete  LE Bathing: Setup;Verbal cueing; Increased time to complete; Moderate assistance  UE Dressing: Stand by assistance;Setup;Verbal cueing; Increased time to complete  LE Dressing: Setup;Verbal cueing; Increased time to complete; Moderate assistance  Additional Comments: Grooming tasks completed seated supported/unsupported in chair. Dressing completed seated supported/unsupported in chair, SBA to doff/don gown and max A to doff/don socks. SBA for UB bathing seated supported/unsupported. Max A for LB bathing seated/standing req assist with mat/post and BLE from knees to feet. Education provided on sx soap adherence. Throughout session pt limited per decreased strength, activity tolerance and increased SOB req increased time with multiple rest breaks. Balance  Sitting Balance: Supervision (pt tolerated approx 30 min static/dynamic sitting unsupported/supported)  Standing Balance: Contact guard assistance  Standing Balance  Time: pt tolerated approx 5 min  Activity: static standing at chair  Comment: utilizing RW limited per increased fatigue  Functional Mobility  Functional - Mobility Device: Rolling Walker  Assist Level: Contact guard assistance   Rest breaks required throughout for mobility.   Transfers  Sit to stand: Contact guard

## 2021-09-03 NOTE — CARE COORDINATION
Spoke with Tomas Corona at [facility] intake, let her know patient may DC today, they can accept her, she will need rapid covid swab before DC    Spoke with Dat at [facility], transportation set up for 4pm.  Patient and spouse updated, agreeable. Tomas Corona at [facility] updated. CHRIS Machado updated, given number for report.   PS Dr. Sapphire Martin to sign cordell    Discharge 1 [facility] Case Management Department  Written by: Jean-Pierre Marvin RN    Patient Name: Lizbet Schilling  Attending Provider: Maryuri Powell MD  Admit Date: 2021 12:12 PM  MRN: [id number]  Account: [de-identified]                     : 1945  Discharge Date:  9/3/2021        Disposition: SNF Milly Desir RN

## 2021-09-08 NOTE — DISCHARGE SUMMARY
[address]     Department of Internal Medicine - Staff Internal Medicine Teaching Service    INPATIENT DISCHARGE SUMMARY      Patient Identification:  Roberto Morales is a 64 y.o. female. :  1945  MRN: [id number]     Acct: [de-identified]   PCP: No primary care provider on file. Admit Date:  2021  Discharge date and time: 9/3/2021  6:01 PM   Attending Provider: Dr. Darlene Tucker Problem Lists:  Principal Problem:    NSTEMI (non-ST elevated myocardial infarction) [facility])  Active Problems:    Chest pain    Pericardial effusion    Intractable nausea and vomiting  Resolved Problems:    * No resolved hospital problems. *      HOSPITAL STAY     Brief Inpatient course: Roberto Morales is a 64 y.o. female who was admitted for the management of NSTEMI (non-ST elevated myocardial infarction) ([facility] [address] presented to the emergency department with a chief complaint of chest pain in the center of the chest, 7/10 in intensity and radiating to the jaw.  Patient received aspirin  and sublingual nitro which decreased her chest pain to 1/10.  She denies any dyspnea or diaphoresis.  Patient has history of coronary artery disease.     The patient denies any fever, cough, lung disease, orthopnea, PND, headache, nasal discharge, abdominal pain, diarrhea, urinary symptoms.     Patient has history of coronary artery disease and undergone coronary artery bypass grafting at age 51, carotid endarterectomy at age 50.  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     Patient takes aspirin 81 mg, Plavix 75 mg daily, atorvastatin 10 mg daily, biotin, diltiazem 180 mg daily, metoprolol 25 mg twice daily and isosorbide 10 mg by mouth twice daily. Patient remained stable after cardiac tamponade was stabilized with pericardial drain.  Cardiology managed NSTEMI with medical management and patient remained stable at the time of discharge. Follow-up with CTS, Cardiology and PCP is recommended. Procedures/ Significant Interventions:    Arterial line placement 08/28  Evacuation of hematoma, pericardial drain placement- Pericardial window drain removed subsequently    Consults:     Consults:     Final Specialist Recommendations/Findings:   IP CONSULT TO CARDIOLOGY  IP CONSULT TO CARDIAC REHAB  IP CONSULT TO SOCIAL WORK  IP CONSULT TO GI  IP CONSULT TO [address] Acquired Infections: none    Discharge Functional Status:  stable    DISCHARGE PLAN     Disposition: SNF    Patient Instructions:   Discharge Medication List as of 9/3/2021  3:44 PM      START taking these medications    Details   amiodarone (CORDARONE) 200 MG tablet Take 1 tablet by mouth 2 times daily, Disp-60 tablet, R-2Normal      lactobacillus (CULTURELLE) capsule Take 1 capsule by mouth 3 times daily (with meals) for 5 days, Disp-15 capsule, R-0Normal      furosemide (LASIX) 40 MG tablet Take 1 tablet by mouth daily, Disp-60 tablet, R-3Normal      ticagrelor (BRILINTA) 90 MG TABS tablet Take 1 tablet by mouth 2 times daily, Disp-60 tablet, R-1Normal      pantoprazole (PROTONIX) 40 MG tablet Take 1 tablet by mouth daily, Disp-30 tablet, R-3Normal         CONTINUE these medications which have CHANGED    Details   metoprolol tartrate (LOPRESSOR) 25 MG tablet Take 0.5 tablets by mouth 2 times daily, Disp-60 tablet, R-3Normal      atorvastatin (LIPITOR) 40 MG tablet Take 1 tablet by mouth daily, Disp-30 tablet, R-5Normal         CONTINUE these medications which have NOT CHANGED    Details   aspirin 81 MG EC tablet Take 81 mg by mouth daily.   Historical Med         STOP taking these medications       ISOSORBIDE Comments:   Reason for Stopping:         DILTIAZEM HCL Comments:   Reason for Stopping:         BIOTIN Comments:   Reason for Stopping:               Activity: activity as tolerated    Diet: cardiac diet    Follow-up:    Asha DAY Ezio Small, APRN - [address]  [phone]      Cardiology -- Wednesday, Sept. 15 at 3:00 at [facility]. office    [facility] [id number] [facility] [address]  [address] [place], [place] [address]y 30 follow up. NSTEMI type I with  complication pericardial temponade      Patient Instructions: Take medicine as follow   Follow up with your own cardiologist in 2 weeks. Kindly do BMP within 3 days     Follow up labs: BMP within 3 days    Follow up imaging: N/A        Steven Munson MD  Internal Medicine Resident, PGY-1  [facility];  [place], [place]  9/8/2021, 4:14 PM

## 2021-11-15 ENCOUNTER — HOSPITAL ENCOUNTER (OUTPATIENT)
Dept: CARDIAC REHAB | Age: 76
Setting detail: THERAPIES SERIES
Discharge: HOME | End: 2021-11-15
Payer: MEDICARE

## 2021-11-15 VITALS — WEIGHT: 103.3 LBS | BODY MASS INDEX: 19.52 KG/M2

## 2021-11-15 PROCEDURE — 93798 PHYS/QHP OP CAR RHAB W/ECG: CPT

## 2021-11-15 ASSESSMENT — PATIENT HEALTH QUESTIONNAIRE - PHQ9
SUM OF ALL RESPONSES TO PHQ QUESTIONS 1-9: 0
SUM OF ALL RESPONSES TO PHQ QUESTIONS 1-9: 0

## 2021-11-17 ENCOUNTER — HOSPITAL ENCOUNTER (OUTPATIENT)
Dept: CARDIAC REHAB | Age: 76
Setting detail: THERAPIES SERIES
Discharge: HOME | End: 2021-11-17
Payer: MEDICARE

## 2021-11-17 VITALS — BODY MASS INDEX: 19.58 KG/M2 | WEIGHT: 103.6 LBS

## 2021-11-17 PROCEDURE — 93798 PHYS/QHP OP CAR RHAB W/ECG: CPT

## 2021-11-22 ENCOUNTER — HOSPITAL ENCOUNTER (OUTPATIENT)
Dept: CARDIAC REHAB | Age: 76
Setting detail: THERAPIES SERIES
Discharge: HOME | End: 2021-11-22
Payer: MEDICARE

## 2021-11-22 VITALS — BODY MASS INDEX: 19.56 KG/M2 | WEIGHT: 103.5 LBS

## 2021-11-22 PROCEDURE — 93798 PHYS/QHP OP CAR RHAB W/ECG: CPT

## 2021-11-24 ENCOUNTER — APPOINTMENT (OUTPATIENT)
Dept: CARDIAC REHAB | Age: 76
End: 2021-11-24
Payer: MEDICARE

## 2021-11-29 ENCOUNTER — HOSPITAL ENCOUNTER (OUTPATIENT)
Dept: CARDIAC REHAB | Age: 76
Setting detail: THERAPIES SERIES
Discharge: HOME | End: 2021-11-29
Payer: MEDICARE

## 2021-11-29 VITALS — WEIGHT: 103 LBS | BODY MASS INDEX: 19.46 KG/M2

## 2021-11-29 PROCEDURE — 93798 PHYS/QHP OP CAR RHAB W/ECG: CPT

## 2021-12-01 ENCOUNTER — HOSPITAL ENCOUNTER (OUTPATIENT)
Dept: CARDIAC REHAB | Age: 76
Setting detail: THERAPIES SERIES
Discharge: HOME | End: 2021-12-01
Payer: MEDICARE

## 2021-12-01 VITALS — BODY MASS INDEX: 19.5 KG/M2 | WEIGHT: 103.2 LBS

## 2021-12-01 PROCEDURE — 93798 PHYS/QHP OP CAR RHAB W/ECG: CPT

## 2021-12-06 ENCOUNTER — HOSPITAL ENCOUNTER (OUTPATIENT)
Dept: CARDIAC REHAB | Age: 76
Setting detail: THERAPIES SERIES
Discharge: HOME | End: 2021-12-06
Payer: MEDICARE

## 2021-12-06 VITALS — BODY MASS INDEX: 19.65 KG/M2 | WEIGHT: 104 LBS

## 2021-12-06 PROCEDURE — 93798 PHYS/QHP OP CAR RHAB W/ECG: CPT

## 2021-12-08 ENCOUNTER — HOSPITAL ENCOUNTER (OUTPATIENT)
Dept: CARDIAC REHAB | Age: 76
Setting detail: THERAPIES SERIES
Discharge: HOME | End: 2021-12-08
Payer: MEDICARE

## 2021-12-08 VITALS — WEIGHT: 104 LBS | BODY MASS INDEX: 19.65 KG/M2

## 2021-12-08 PROCEDURE — 93798 PHYS/QHP OP CAR RHAB W/ECG: CPT

## 2021-12-13 ENCOUNTER — HOSPITAL ENCOUNTER (OUTPATIENT)
Dept: CARDIAC REHAB | Age: 76
Setting detail: THERAPIES SERIES
Discharge: HOME | End: 2021-12-13
Payer: MEDICARE

## 2021-12-13 VITALS — BODY MASS INDEX: 19.71 KG/M2 | WEIGHT: 104.3 LBS

## 2021-12-13 PROCEDURE — 93798 PHYS/QHP OP CAR RHAB W/ECG: CPT

## 2021-12-15 ENCOUNTER — HOSPITAL ENCOUNTER (OUTPATIENT)
Dept: CARDIAC REHAB | Age: 76
Setting detail: THERAPIES SERIES
Discharge: HOME | End: 2021-12-15
Payer: MEDICARE

## 2021-12-15 VITALS — BODY MASS INDEX: 19.86 KG/M2 | WEIGHT: 105.2 LBS | HEIGHT: 61 IN

## 2021-12-15 PROCEDURE — 93798 PHYS/QHP OP CAR RHAB W/ECG: CPT

## 2021-12-15 NOTE — PROGRESS NOTES
ITP UPDATED, GOALS REVIEWED. PT STATES SHE FEELS SHE HAS INCREASE ENDURANCE WITH CARDIAC REHAB. CONTINUES TO WORK ON THESE GOALS.

## 2021-12-20 ENCOUNTER — HOSPITAL ENCOUNTER (OUTPATIENT)
Dept: CARDIAC REHAB | Age: 76
Setting detail: THERAPIES SERIES
Discharge: HOME | End: 2021-12-20
Payer: MEDICARE

## 2021-12-20 VITALS — WEIGHT: 105.3 LBS | BODY MASS INDEX: 19.9 KG/M2

## 2021-12-20 PROCEDURE — 93798 PHYS/QHP OP CAR RHAB W/ECG: CPT

## 2021-12-22 ENCOUNTER — HOSPITAL ENCOUNTER (OUTPATIENT)
Dept: CARDIAC REHAB | Age: 76
Setting detail: THERAPIES SERIES
Discharge: HOME | End: 2021-12-22
Payer: MEDICARE

## 2021-12-22 VITALS — BODY MASS INDEX: 19.84 KG/M2 | WEIGHT: 105 LBS

## 2021-12-22 PROCEDURE — 93798 PHYS/QHP OP CAR RHAB W/ECG: CPT

## 2021-12-27 ENCOUNTER — HOSPITAL ENCOUNTER (OUTPATIENT)
Dept: CARDIAC REHAB | Age: 76
Setting detail: THERAPIES SERIES
Discharge: HOME | End: 2021-12-27
Payer: MEDICARE

## 2021-12-27 VITALS — WEIGHT: 106 LBS | BODY MASS INDEX: 20.03 KG/M2

## 2021-12-27 PROCEDURE — 93798 PHYS/QHP OP CAR RHAB W/ECG: CPT

## 2021-12-29 ENCOUNTER — HOSPITAL ENCOUNTER (OUTPATIENT)
Dept: CARDIAC REHAB | Age: 76
Setting detail: THERAPIES SERIES
Discharge: HOME | End: 2021-12-29
Payer: MEDICARE

## 2021-12-29 VITALS — WEIGHT: 106 LBS | BODY MASS INDEX: 20.03 KG/M2

## 2021-12-29 PROCEDURE — 93798 PHYS/QHP OP CAR RHAB W/ECG: CPT

## 2022-01-03 ENCOUNTER — HOSPITAL ENCOUNTER (OUTPATIENT)
Dept: CARDIAC REHAB | Age: 77
Setting detail: THERAPIES SERIES
Discharge: HOME | End: 2022-01-03
Payer: MEDICARE

## 2022-01-03 VITALS — BODY MASS INDEX: 20.03 KG/M2 | WEIGHT: 106 LBS

## 2022-01-03 PROCEDURE — 93798 PHYS/QHP OP CAR RHAB W/ECG: CPT

## 2022-01-05 ENCOUNTER — HOSPITAL ENCOUNTER (OUTPATIENT)
Dept: CARDIAC REHAB | Age: 77
Setting detail: THERAPIES SERIES
Discharge: HOME | End: 2022-01-05
Payer: MEDICARE

## 2022-01-05 VITALS — BODY MASS INDEX: 20.18 KG/M2 | WEIGHT: 106.8 LBS

## 2022-01-05 PROCEDURE — 93798 PHYS/QHP OP CAR RHAB W/ECG: CPT

## 2022-01-10 ENCOUNTER — HOSPITAL ENCOUNTER (OUTPATIENT)
Dept: CARDIAC REHAB | Age: 77
Setting detail: THERAPIES SERIES
Discharge: HOME | End: 2022-01-10
Payer: MEDICARE

## 2022-01-10 PROCEDURE — 93798 PHYS/QHP OP CAR RHAB W/ECG: CPT

## 2022-01-12 ENCOUNTER — HOSPITAL ENCOUNTER (OUTPATIENT)
Dept: CARDIAC REHAB | Age: 77
Setting detail: THERAPIES SERIES
Discharge: HOME | End: 2022-01-12
Payer: MEDICARE

## 2022-01-12 VITALS — BODY MASS INDEX: 20.22 KG/M2 | WEIGHT: 107 LBS

## 2022-01-12 PROCEDURE — 93798 PHYS/QHP OP CAR RHAB W/ECG: CPT

## 2022-01-17 ENCOUNTER — HOSPITAL ENCOUNTER (OUTPATIENT)
Dept: CARDIAC REHAB | Age: 77
Setting detail: THERAPIES SERIES
Discharge: HOME | End: 2022-01-17
Payer: MEDICARE

## 2022-01-17 VITALS — WEIGHT: 107 LBS | HEIGHT: 61 IN | BODY MASS INDEX: 20.2 KG/M2

## 2022-01-17 PROCEDURE — 93798 PHYS/QHP OP CAR RHAB W/ECG: CPT

## 2022-01-19 ENCOUNTER — HOSPITAL ENCOUNTER (OUTPATIENT)
Dept: CARDIAC REHAB | Age: 77
Setting detail: THERAPIES SERIES
Discharge: HOME | End: 2022-01-19
Payer: MEDICARE

## 2022-01-19 VITALS — WEIGHT: 107 LBS | BODY MASS INDEX: 20.22 KG/M2

## 2022-01-19 PROCEDURE — 93798 PHYS/QHP OP CAR RHAB W/ECG: CPT

## 2022-01-24 ENCOUNTER — HOSPITAL ENCOUNTER (OUTPATIENT)
Dept: CARDIAC REHAB | Age: 77
Setting detail: THERAPIES SERIES
Discharge: HOME | End: 2022-01-24
Payer: MEDICARE

## 2022-01-24 VITALS — WEIGHT: 108 LBS | BODY MASS INDEX: 20.41 KG/M2

## 2022-01-24 PROCEDURE — 93798 PHYS/QHP OP CAR RHAB W/ECG: CPT

## 2022-01-26 ENCOUNTER — HOSPITAL ENCOUNTER (OUTPATIENT)
Dept: CARDIAC REHAB | Age: 77
Setting detail: THERAPIES SERIES
Discharge: HOME | End: 2022-01-26
Payer: MEDICARE

## 2022-01-26 VITALS — WEIGHT: 108 LBS | BODY MASS INDEX: 20.41 KG/M2

## 2022-01-26 PROCEDURE — 93798 PHYS/QHP OP CAR RHAB W/ECG: CPT

## 2022-01-26 NOTE — PROGRESS NOTES
Goals reviewed, pt states she is feeling stronger since stating cardiac rehab, states she is able to do a little more housework now, is exercising some at home, pt encouraged to exercise 30min 5-6x/week. Continues to work on these goals.

## 2022-01-31 ENCOUNTER — HOSPITAL ENCOUNTER (OUTPATIENT)
Dept: CARDIAC REHAB | Age: 77
Setting detail: THERAPIES SERIES
Discharge: HOME | End: 2022-01-31
Payer: MEDICARE

## 2022-01-31 VITALS — SYSTOLIC BLOOD PRESSURE: 132 MMHG | BODY MASS INDEX: 20.41 KG/M2 | DIASTOLIC BLOOD PRESSURE: 66 MMHG | WEIGHT: 108 LBS

## 2022-01-31 PROCEDURE — 93798 PHYS/QHP OP CAR RHAB W/ECG: CPT

## 2022-02-02 ENCOUNTER — HOSPITAL ENCOUNTER (OUTPATIENT)
Dept: CARDIAC REHAB | Age: 77
Setting detail: THERAPIES SERIES
Discharge: HOME | End: 2022-02-02
Payer: MEDICARE

## 2022-02-02 VITALS — WEIGHT: 108 LBS | BODY MASS INDEX: 20.41 KG/M2

## 2022-02-02 PROCEDURE — 93798 PHYS/QHP OP CAR RHAB W/ECG: CPT

## 2022-02-07 ENCOUNTER — HOSPITAL ENCOUNTER (OUTPATIENT)
Dept: CARDIAC REHAB | Age: 77
Setting detail: THERAPIES SERIES
Discharge: HOME | End: 2022-02-07
Payer: MEDICARE

## 2022-02-07 VITALS — WEIGHT: 107 LBS | BODY MASS INDEX: 20.22 KG/M2

## 2022-02-07 PROCEDURE — 93798 PHYS/QHP OP CAR RHAB W/ECG: CPT

## 2022-02-09 ENCOUNTER — HOSPITAL ENCOUNTER (OUTPATIENT)
Dept: CARDIAC REHAB | Age: 77
Setting detail: THERAPIES SERIES
Discharge: HOME | End: 2022-02-09
Payer: MEDICARE

## 2022-02-09 VITALS — WEIGHT: 108 LBS | BODY MASS INDEX: 20.41 KG/M2

## 2022-02-09 PROCEDURE — 93798 PHYS/QHP OP CAR RHAB W/ECG: CPT

## 2022-02-09 NOTE — PROGRESS NOTES
GOAL REVIEWED WITH PT. PT WORKING OUT MORE ON THE TREADMILL AND STATES SHE IS FEELING THAT SHE CAN TOLERATE MORE ACTIVITY.

## 2022-02-14 ENCOUNTER — HOSPITAL ENCOUNTER (OUTPATIENT)
Dept: CARDIAC REHAB | Age: 77
Setting detail: THERAPIES SERIES
Discharge: HOME | End: 2022-02-14
Payer: MEDICARE

## 2022-02-14 VITALS — WEIGHT: 109 LBS | BODY MASS INDEX: 20.6 KG/M2

## 2022-02-14 PROCEDURE — 93798 PHYS/QHP OP CAR RHAB W/ECG: CPT

## 2022-02-16 ENCOUNTER — HOSPITAL ENCOUNTER (OUTPATIENT)
Dept: CARDIAC REHAB | Age: 77
Setting detail: THERAPIES SERIES
Discharge: HOME | End: 2022-02-16
Payer: MEDICARE

## 2022-02-16 VITALS — BODY MASS INDEX: 20.54 KG/M2 | WEIGHT: 108.7 LBS

## 2022-02-16 PROCEDURE — 93798 PHYS/QHP OP CAR RHAB W/ECG: CPT

## 2022-02-16 NOTE — PLAN OF CARE
[facility] Based Cardiac Rehabilitation  Individual Treatment Plan    Patient Name:  Stanford Baez  :  1945  Age:  64 y.o. Diagnosis: STENT Date of Event: 21  Date Entered Program: 11-15-21  Physician:  Sheri Echols  History:   Past Medical History:   Diagnosis Date    Atherosclerosis     requiring carotid endarterectomy    CAD (coronary artery disease)     Hydatidiform mole     no chemo or radiation    Rosacea      Allergies: Allergies   Allergen Reactions    Aspirin-Acetaminophen-Caffeine      Unsure of what the exact migraine med was     Patient Goal: INCREASE STRENGTH  Amount of Minutes per piece  Week  Warm Up  Leg Arm  Leg  Arm  Leg  Arm  Duration   1 4 7 3 7 3 7 - 31   2 4 7 3 7 3 7 3 34   3 4 8 3 8 3 8 3 37   4 4 8 4 8 4 8 4 40   5 4 9 4 9 4 9 4 43   6 4 9 5 9 5 9 5 46   Max 7 4 10 5 10 5 10 5 49        Exercise Prescription:    Type of exercise:  Legs- Treadmill, Airdyne, Nustep  Arms- Free weights, Airdyne, Ergometer, Nustep                               Frequency:  2-3 times per week         Plan of Progresssion:  Amount and duration will increase every       2-3 visits          Phase 2 Cardiac Rehabilitation  Individualized Cardiac Treatment Plan    Individual Cardiac Treatment Plan  EXERCISE  EXERCISE  REASSESSMENT   Re-Assessment   Stages of Change    []Contemplation   [x]Action   []Relapse   EXERCISE ASSESSMENT   Home Exercise   [x]Yes    []No  Type:  Aerobics   []  Bicycling  []  Cardiovascular  []  Gardening  []  Hiking  []  House Cleaning   []  Run/Jog  []  Swim  []  Walk  [x]  Yard Work  []  Other  []     Frequency:  Daily  []  Q2 days  []  Q3 days  []  Biweekly  []  Irregularly  [x]  Other  []     Duration:   Seconds []  Minutes [x] 30  Hours []  Other []   EXERCISE PLAN   Interventions*  Education:   [x]Self Pulse   [x]Medication HR/BP   [x]Exercise Safety   [x]S/S to report   [x]RPE scale   [x]Equip. [place].    [x]Warmup/cool down   [x]Hand  [x]Home exercise encouraged   Target Goal:  Follow individual exercise Rx, aerobic exercise, 30+minutes 5x/week   Exercise Prescription  (per physician & CR staff)  Met Level: 2.2       Individual Cardiac Treatment Plan - EDUCATION  Reassessment  Learning Barriers   Speech  []  Hearing  []  Vision  []  Cognitive  []  Ready to Learn [x]     Tobacco Use  []Y  [x]N  []Quit    HTN []Y  [x]N      Diabetic []Y  [x]N     Pre-cardiac test:__100___     Intervention/Education   []Referal to smoking cessation  []Encouraged smoking cessation   [x]CAD   [x]Risk factors   [x]Med Compliance   [x]Cardiac A/P   [x]Angina S/S   [x]NTG use   [x]Sexual activity    Target Goal:  Increase knowledge of risk factors     Individual Cardiac Treatment Plan  NUTRITION  NUTRITION  REASSESSMENT      Stages of Change    []Contemplation   [x]Action   []Relapse   NUTRITION ASSESSMENT   Weight Management  Weight: 108 LB [unfilled]      Height:  Ht Readings from Last 1 Encounters:   01/17/22 5' 1\" (1.549 m)          BMI:  Body mass index is 20.54 kg/m². Date:  Lipids:  Cholesterol (mg/dL)   Date Value   10/28/2015 173     HDL (mg/dL)   Date Value   05/07/2021 70     LDL Cholesterol (mg/dL)   Date Value   05/07/2021 61     Chol/HDL Ratio (no units)   Date Value   05/07/2021 2.1     Triglycerides (mg/dL)   Date Value   10/28/2015 76     VLDL (mg/dL)   Date Value   05/07/2021 NOT REPORTED      Cholesterol Drug Therapy:   [x]Y  []N  Why:   Diabetic:   []Y  [x]N  Education Needed    []Y  [x]N   Goal: Maintain Heart Healthy Weight. Professional Referral  Please check if needed. []Dietician Consult    [x]Nurse/Patient Ed   []Wt. Management Referral   []Other:   Goal: Understand Lipid Results; Target:  LDL below 70, HDL above 40          Individual Cardiac Treatment Plan  PYSCHOSOCIAL  PSYCHOSOCIAL  REASSESSMENT      Psychosocial test:  PHQ 9 score:__0___   Intervention/  Education  If score 5 or above:   []Psych consult   []Physician notified  If pt declined, Why?    [x]Relaxation technique   [x]S/S of depression   [x]Coping techniques   Target goal:  Decreased stress levels

## 2022-02-21 ENCOUNTER — HOSPITAL ENCOUNTER (OUTPATIENT)
Dept: CARDIAC REHAB | Age: 77
Setting detail: THERAPIES SERIES
Discharge: HOME | End: 2022-02-21
Payer: MEDICARE

## 2022-02-21 VITALS — WEIGHT: 108.9 LBS | BODY MASS INDEX: 20.58 KG/M2

## 2022-02-21 PROCEDURE — 93798 PHYS/QHP OP CAR RHAB W/ECG: CPT

## 2022-02-23 ENCOUNTER — HOSPITAL ENCOUNTER (OUTPATIENT)
Dept: CARDIAC REHAB | Age: 77
Setting detail: THERAPIES SERIES
Discharge: HOME | End: 2022-02-23
Payer: MEDICARE

## 2022-02-23 VITALS — WEIGHT: 108.5 LBS | BODY MASS INDEX: 20.5 KG/M2

## 2022-02-23 PROCEDURE — 93798 PHYS/QHP OP CAR RHAB W/ECG: CPT

## 2022-02-23 NOTE — PROGRESS NOTES
Goals reviewed, pt states feeling stronger and has more endurance since starting Cardiac Rehab, continues to work on these goals.

## 2022-02-23 NOTE — PROGRESS NOTES
GOAL REVIEWED WITH PT. PT STATES SHE FEELS STRONGER AND TOLERATING LONGER PERIODS OF EXERCISE ON TREADMILL AT HOME.

## 2022-02-28 ENCOUNTER — HOSPITAL ENCOUNTER (OUTPATIENT)
Dept: CARDIAC REHAB | Age: 77
Setting detail: THERAPIES SERIES
Discharge: HOME | End: 2022-02-28
Payer: MEDICARE

## 2022-02-28 VITALS — BODY MASS INDEX: 20.6 KG/M2 | WEIGHT: 109 LBS

## 2022-02-28 PROCEDURE — 93798 PHYS/QHP OP CAR RHAB W/ECG: CPT

## 2022-03-02 ENCOUNTER — HOSPITAL ENCOUNTER (OUTPATIENT)
Dept: CARDIAC REHAB | Age: 77
Setting detail: THERAPIES SERIES
Discharge: HOME | End: 2022-03-02
Payer: MEDICARE

## 2022-03-02 VITALS — BODY MASS INDEX: 20.6 KG/M2 | WEIGHT: 109 LBS

## 2022-03-02 PROCEDURE — 93798 PHYS/QHP OP CAR RHAB W/ECG: CPT

## 2022-03-07 ENCOUNTER — HOSPITAL ENCOUNTER (OUTPATIENT)
Dept: CARDIAC REHAB | Age: 77
Setting detail: THERAPIES SERIES
Discharge: HOME | End: 2022-03-07
Payer: MEDICARE

## 2022-03-07 VITALS — WEIGHT: 108 LBS | BODY MASS INDEX: 20.41 KG/M2

## 2022-03-07 PROCEDURE — 93798 PHYS/QHP OP CAR RHAB W/ECG: CPT

## 2022-03-09 ENCOUNTER — HOSPITAL ENCOUNTER (OUTPATIENT)
Dept: CARDIAC REHAB | Age: 77
Setting detail: THERAPIES SERIES
Discharge: HOME | End: 2022-03-09
Payer: MEDICARE

## 2022-03-09 VITALS — WEIGHT: 108 LBS | BODY MASS INDEX: 20.41 KG/M2

## 2022-03-09 PROCEDURE — 93798 PHYS/QHP OP CAR RHAB W/ECG: CPT

## 2022-03-09 PROCEDURE — 93797 PHYS/QHP OP CAR RHAB WO ECG: CPT

## 2022-03-09 NOTE — PROGRESS NOTES
GOAL REVIEWED WITH PT. PT STATES SHE IS EXERCISING AT HOME ON HER TREADMILL MORE THAN 3 TIMES A WEEK.

## 2022-03-14 ENCOUNTER — HOSPITAL ENCOUNTER (OUTPATIENT)
Dept: CARDIAC REHAB | Age: 77
Setting detail: THERAPIES SERIES
Discharge: HOME | End: 2022-03-14
Payer: MEDICARE

## 2022-03-14 VITALS — WEIGHT: 109 LBS | BODY MASS INDEX: 20.6 KG/M2

## 2022-03-14 PROCEDURE — 93798 PHYS/QHP OP CAR RHAB W/ECG: CPT

## 2022-03-16 ENCOUNTER — HOSPITAL ENCOUNTER (OUTPATIENT)
Dept: CARDIAC REHAB | Age: 77
Setting detail: THERAPIES SERIES
Discharge: HOME | End: 2022-03-16
Payer: MEDICARE

## 2022-03-16 VITALS — WEIGHT: 109 LBS | BODY MASS INDEX: 20.6 KG/M2

## 2022-03-16 PROCEDURE — 93798 PHYS/QHP OP CAR RHAB W/ECG: CPT

## 2022-03-21 ENCOUNTER — APPOINTMENT (OUTPATIENT)
Dept: CARDIAC REHAB | Age: 77
End: 2022-03-21
Payer: MEDICARE

## 2022-03-23 ENCOUNTER — HOSPITAL ENCOUNTER (OUTPATIENT)
Dept: CARDIAC REHAB | Age: 77
Setting detail: THERAPIES SERIES
Discharge: HOME | End: 2022-03-23
Payer: MEDICARE

## 2022-03-23 VITALS — WEIGHT: 109 LBS | BODY MASS INDEX: 20.6 KG/M2

## 2022-03-23 PROCEDURE — 93798 PHYS/QHP OP CAR RHAB W/ECG: CPT

## 2022-03-23 ASSESSMENT — PATIENT HEALTH QUESTIONNAIRE - PHQ9
SUM OF ALL RESPONSES TO PHQ QUESTIONS 1-9: 0
2. FEELING DOWN, DEPRESSED OR HOPELESS: 0
SUM OF ALL RESPONSES TO PHQ QUESTIONS 1-9: 0
SUM OF ALL RESPONSES TO PHQ9 QUESTIONS 1 & 2: 0
1. LITTLE INTEREST OR PLEASURE IN DOING THINGS: 0
SUM OF ALL RESPONSES TO PHQ QUESTIONS 1-9: 0
SUM OF ALL RESPONSES TO PHQ QUESTIONS 1-9: 0

## 2022-03-23 NOTE — PROGRESS NOTES
Pt completed 36/36 visits. ITP updated, PHQ completed, medications reviewed and updated as needed. Pt does plan to participate in Phase 3 cardiac rehab.      Electronically signed by Emili Russell RN on 3/23/2022 at 10:39 AM

## 2022-03-23 NOTE — PLAN OF CARE
[facility] Based Cardiac Rehabilitation  Individual Treatment Plan    Patient Name:  Oli Mcneil  :  1945  Age:  76 y.o. Diagnosis: CAD/ MI STENTS  Date of Event: 2021  Date Entered Program: 11/15/2021  Physician:  Josiah Lewis  History:   Past Medical History:   Diagnosis Date    Atherosclerosis     requiring carotid endarterectomy    CAD (coronary artery disease)     Hydatidiform mole     no chemo or radiation    Rosacea      Allergies:    Allergies   Allergen Reactions    Aspirin-Acetaminophen-Caffeine      Unsure of what the exact migraine med was     Patient Goal: To Get Stronger    Amount of Minutes per piece  Week  Warm Up  Leg Arm  Leg  Arm  Leg  Arm  Duration   1 4 7 3 7 3 7 - 31   2 4 7 3 7 3 7 3 34   3 4 8 3 8 3 8 3 37   4 4 8 4 8 4 8 4 40   5 4 9 4 9 4 9 4 43   6 4 9 5 9 5 9 5 46   Max 7 4 10 5 10 5 10 5 49        Exercise Prescription:    Type of exercise:  Legs- Treadmill, Airdyne, Nustep  Arms- Free weights, Airdyne, Ergometer, Nustep                               Frequency:  3 times per week         Plan of Progresssion:  Amount and duration will increase every       2-3 visits          Phase 2 Cardiac Rehabilitation  Individualized Cardiac Treatment Plan    Individual Cardiac Treatment Plan - EXERCISE  EXERCISE  DISCHARGE      Stages of Change    []Decline   [x]Maintain   []Relapse   EXERCISE ASSESSMENT   Home Exercise   [x]Yes   [] No  Type:   Aerobics   []  Bicycling  []  Cardiovascular  []  Gardening  []  Hiking  []  House Cleaning   []  Run/Jog  []  Swim  []  Walk  [x]  Yard Work  []  Other  []    Frequency:  Daily  []  Q2 days  []  Q3 days  []  Biweekly  []  Irregularly  []  Other  [x] 4 times a week    Duration:   Seconds []  45 Minutes [x]  Hours []  Other []   EXERCISE PLAN    [x]Phase 3 offered   [x]Home exercise format given    Met Level: 2.4     Education goals met:   [x]Y  []N   Target Goal:  Follow individual exercise Rx, aerobic exercise, 30+minutes 5x/week   Exercise Prescription  (per physician & CR staff)             Individual Cardiac Treatment Plan - EDUCATION    Follow-up  Discharge   Post- cardiac score:__90%_____    Tobacco use  []Y  [x]N  []Quit  If yes, why:      Education Goals Met   [x]Y  []N    Target Goal;  Increase knowledge of risk factors  [x]Y  []N         Individual Cardiac Treatment Plan - NUTRITION    NUTRITION  DISCHARGE/FOLLOW-UP       Stages of Change    []Contemplation   [x]Action stated eating a lot of vegetables and watching salt intake   []Relapse   NUTRITION ASSESSMENT   Weight Management  Weight: 109 lbs      Height:  Ht Readings from Last 1 Encounters:   01/17/22 5' 1\" (1.549 m)          BMI:  Body mass index is 20.6 kg/m². Date:  Lipids:  Cholesterol (mg/dL)   Date Value   10/28/2015 173     HDL (mg/dL)   Date Value   05/07/2021 70     LDL Cholesterol (mg/dL)   Date Value   05/07/2021 61     Chol/HDL Ratio (no units)   Date Value   05/07/2021 2.1     Triglycerides (mg/dL)   Date Value   10/28/2015 76     VLDL (mg/dL)   Date Value   05/07/2021 NOT REPORTED             Goal: Understand lipid results and maitain a heart healthy weight. Education goals met   [x]Y  []N     Individual Cardiac Treatment Plan - PYSCHOSOCIAL      PSYCHOSOCIAL  DISCHARGE/FOLLOW-UP   Psychosocial   PHQ 9 score___0____   Goal: Decrease stress level. Increase healthy lifestyle. Goals met: [x]Y  []N              3/23/2022 Final ITP completed. Medications reviewed. Patient plans to come back to Cardiac Rehab for Phase 3. Per patient she does a 45 minute exercise routine (10 minutes on her arms, 15 minutes on treadmill and up and down steps 6 times) 3 times a week and walks at the mall for 45 minutes once a week outside of cardiac rehab currently. She stated she eats a lot of vegetables and watches her sodium intake.     Electronically signed by Milagros Fuentes RN on 3/23/2022 at 10:30 AM

## 2022-05-11 PROCEDURE — 9900000065 HC CARDIAC REHAB PHASE 3 - 1 VISIT

## 2022-05-25 ENCOUNTER — HOSPITAL ENCOUNTER (OUTPATIENT)
Dept: CARDIAC REHAB | Age: 77
Discharge: HOME | End: 2022-05-25

## 2022-05-25 PROCEDURE — 9900000061 HC CARDIAC REHAB PHASE 3 - 5 VISTS

## 2022-07-05 PROCEDURE — 9900000065 HC CARDIAC REHAB PHASE 3 - 1 VISIT

## 2022-07-28 ENCOUNTER — HOSPITAL ENCOUNTER (OUTPATIENT)
Dept: CARDIAC REHAB | Age: 77
Discharge: HOME | End: 2022-07-28

## 2022-08-09 PROCEDURE — 9900000065 HC CARDIAC REHAB PHASE 3 - 1 VISIT

## 2022-08-25 ENCOUNTER — HOSPITAL ENCOUNTER (OUTPATIENT)
Dept: CARDIAC REHAB | Age: 77
Discharge: HOME | End: 2022-08-25

## 2023-07-13 NOTE — PLAN OF CARE
Problem: Pain:  Goal: Pain level will decrease  Description: Pain level will decrease  Outcome: Ongoing  Goal: Control of acute pain  Description: Control of acute pain  Outcome: Ongoing  Goal: Control of chronic pain  Description: Control of chronic pain  Outcome: Ongoing [[email or web address]] : Documented by Danyell Lynn acting as a scribe for Dr. Matrín Call on 07/13/2023 \par \par All medical record entries made by the Scribe were at my, Dr. Martín Call's, direction and personally dictated by me on 07/13/2023 . I have reviewed the chart and agree that the record accurately reflects my personal performance of the history, physical exam, assessment and plan. I have also personally directed, reviewed, and agree with the discharge instructions.

## 2024-01-18 NOTE — PROGRESS NOTES
I discussed this case with the resident. I agree with the Resident's plan.    Patient called out complaining of shortness of breath. Oxygen saturation noted to be 92% on 6L nasal cannula. Expiratory wheezing noted upon auscultation. Writer contacted RT to come bedside.

## (undated) DEVICE — GLOVE SURG SZ 6 CRM LTX FREE POLYISOPRENE POLYMER BEAD ANTI

## (undated) DEVICE — DRAIN,WOUND,ROUND,24FR,5/16",FULL-FLUTED: Brand: MEDLINE

## (undated) DEVICE — TUBING, SUCTION, 9/32" X 20', STRAIGHT: Brand: MEDLINE INDUSTRIES, INC.

## (undated) DEVICE — CONNECTOR TBNG WHT PLAS SUCT STR 5IN1 LTWT W/ M CONN

## (undated) DEVICE — GLOVE SURG SZ 65 L12IN FNGR THK79MIL GRN LTX FREE

## (undated) DEVICE — COVER,LIGHT HANDLE,FLX,2/PK: Brand: MEDLINE INDUSTRIES, INC.

## (undated) DEVICE — MEDI-TRACE CADENCE ADULT, DEFIBRILLATION ELECTRODE -RTS  (10 PR/PK) - PHYSIO-CONTROL: Brand: MEDI-TRACE CADENCE

## (undated) DEVICE — GLOVE SURG SZ 7.5 L11.73IN FNGR THK9.8MIL STRW LTX POLYMER

## (undated) DEVICE — CLIP INT M L GRN TI TRNSVRS GRV CHEVRON SHP W/ PRECIS TIP

## (undated) DEVICE — SUTURE VCRL + SZ 3-0 L27IN ABSRB WHT CT-1 1/2 CIR VCP258H

## (undated) DEVICE — GEL US 20GM NONIRRITATING OVERWRAPPED FILE PCH TRNSMIT

## (undated) DEVICE — POSITIONER,HEAD,MULTIRING,36CS: Brand: MEDLINE

## (undated) DEVICE — GLOVE SURG SZ 65 CRM LTX FREE POLYISOPRENE POLYMER BEAD ANTI

## (undated) DEVICE — TTL1LYR 16FR10ML 100%SIL TMPST TR: Brand: MEDLINE

## (undated) DEVICE — GLOVE SURG SZ 8 CRM LTX FREE POLYISOPRENE POLYMER BEAD ANTI

## (undated) DEVICE — GOWN,SIRUS,NONRNF,SETINSLV,XL,20/CS: Brand: MEDLINE

## (undated) DEVICE — GLOVE SURG SZ 75 CRM LTX FREE POLYISOPRENE POLYMER BEAD ANTI

## (undated) DEVICE — GOWN,SIRUS,NONRNF,SETINSLV,2XL,18/CS: Brand: MEDLINE

## (undated) DEVICE — CLIP LIG M BLU TI HRT SHP WIRE HORZ 180 PER BX

## (undated) DEVICE — CLIP INT SM WIDE RED TI TRNSVRS GRV CHEVRON SHP W/ PRECIS

## (undated) DEVICE — GOWN,SIRUS,POLYRNF,XLN/3XL,18/CS: Brand: MEDLINE

## (undated) DEVICE — ADHESIVE SKIN CLOSURE TOP 36 CC HI VISC DERMBND MINI

## (undated) DEVICE — KIT SURG PREP POVIDONE IOD PRESATURATED PAINT WET FOR UNIV

## (undated) DEVICE — GOWN,AURORA,NONREINFORCED,LARGE: Brand: MEDLINE

## (undated) DEVICE — INTENDED FOR TISSUE SEPARATION, AND OTHER PROCEDURES THAT REQUIRE A SHARP SURGICAL BLADE TO PUNCTURE OR CUT.: Brand: BARD-PARKER ® CARBON RIB-BACK BLADES